# Patient Record
Sex: MALE | Race: WHITE | NOT HISPANIC OR LATINO | Employment: OTHER | URBAN - METROPOLITAN AREA
[De-identification: names, ages, dates, MRNs, and addresses within clinical notes are randomized per-mention and may not be internally consistent; named-entity substitution may affect disease eponyms.]

---

## 2017-02-09 ENCOUNTER — TELEPHONE (OUTPATIENT)
Dept: RHEUMATOLOGY | Facility: CLINIC | Age: 82
End: 2017-02-09

## 2017-02-09 NOTE — TELEPHONE ENCOUNTER
"Unable to discuss specifics with Amarilis as there is not a signed consent in the chart.  We did discuss that an appointment could be made with the provider the pt saw much sooner than May, even tomorrow. She will go to her father's home tomorrow and they will call to discuss the appointment and if he wants to be seen sooner. Also asked Amarilis to have the local MD fax all of the lab results that have been done since pt was last seen here to the present.    Amarilis and other family members are concerned about the patient as he doesn't look right, even though the pt said he is \"fine\". Pt is currently at 10 mg of prednisone daily and it had been suggested by local Rheumatologist to decrease by 1 mg and they are concerned that it should be done.     We will be able to discuss further with pt tomorrow, when they call this clinic. Fax numbers given to Amarilis for the lab results.  "

## 2017-02-09 NOTE — TELEPHONE ENCOUNTER
Amarilis (daughter) called re: care plan. Asking what Tx plan is, local rheumatologist trying to contact Dr. Navarrete several wks. Amarilis concerned about recent out of range labs from local clinic, also next appt changed to 5/2017. Can be reached at [phone]. Detailed VM fine. Message sent to rheum pool.

## 2017-02-13 NOTE — TELEPHONE ENCOUNTER
----- Message from Finn Navarrete MD sent at 2/10/2017  9:36 AM CST -----  Regarding: RE: Follow Up Appointment  ThanksBradly Nettles    ----- Message -----     From: Sunil Lester, RN     Sent: 2/10/2017   8:44 AM       To: Finn Navarrete MD  Subject: RE: Follow Up Appointment                        I spoke with the daughter last evening and had offered an appt with you next week. Actually I had asked if they would be able to come today, but she didn't think that would be possible. They will be calling me today sometime to discuss.    ----- Message -----     From: Finn Navarrete MD     Sent: 2/10/2017   8:05 AM       To: Sunil Lester RN  Subject: Follow Up Appointment                            Dickson Barber,    I spoke with Dr. Martinez (referring rheumatologist from [place]) about this patient yesterday. They would like to be seen sooner than their already scheduled follow up in April.    This is OK with me. Can someone reach out to them to set up an appointment in the next couple weeks? Next week would be good because I have a lot of openings.    Thanks,    Tho

## 2017-02-13 NOTE — TELEPHONE ENCOUNTER
Call placed to pt. Informed him that his provider wanted the pt seen earlier than his April appointment. Pt is agreeable to be seen this Friday at 7:30 am by Dr Navarrete. He verified understanding by repeating date and time. Staff message sent to the pool asking for assistance in scheduling this appointment.

## 2017-02-15 ENCOUNTER — MYC MEDICAL ADVICE (OUTPATIENT)
Dept: RHEUMATOLOGY | Facility: CLINIC | Age: 82
End: 2017-02-15

## 2017-02-15 NOTE — TELEPHONE ENCOUNTER
This message has been sent to the covering nurse for review.   Whitley Torres CMA (AAMA)  P Rheumatology

## 2017-02-17 ENCOUNTER — OFFICE VISIT (OUTPATIENT)
Dept: RHEUMATOLOGY | Facility: CLINIC | Age: 82
End: 2017-02-17
Attending: INTERNAL MEDICINE
Payer: MEDICARE

## 2017-02-17 VITALS
SYSTOLIC BLOOD PRESSURE: 137 MMHG | WEIGHT: 165 LBS | OXYGEN SATURATION: 99 % | HEART RATE: 72 BPM | BODY MASS INDEX: 25.9 KG/M2 | DIASTOLIC BLOOD PRESSURE: 79 MMHG | HEIGHT: 67 IN

## 2017-02-17 DIAGNOSIS — M35.3 PMR (POLYMYALGIA RHEUMATICA) (H): ICD-10-CM

## 2017-02-17 DIAGNOSIS — M35.3 PMR (POLYMYALGIA RHEUMATICA) (H): Primary | ICD-10-CM

## 2017-02-17 DIAGNOSIS — Z87.39 HX OF CALCIUM PYROPHOSPHATE DEPOSITION DISEASE (CPPD): ICD-10-CM

## 2017-02-17 LAB
ALBUMIN SERPL-MCNC: 3.3 G/DL (ref 3.4–5)
ALP SERPL-CCNC: 69 U/L (ref 40–150)
ALT SERPL W P-5'-P-CCNC: 20 U/L (ref 0–70)
ANION GAP SERPL CALCULATED.3IONS-SCNC: 10 MMOL/L (ref 3–14)
AST SERPL W P-5'-P-CCNC: 19 U/L (ref 0–45)
BASOPHILS # BLD AUTO: 0 10E9/L (ref 0–0.2)
BASOPHILS NFR BLD AUTO: 0.1 %
BILIRUB DIRECT SERPL-MCNC: <0.1 MG/DL (ref 0–0.2)
BILIRUB SERPL-MCNC: 0.3 MG/DL (ref 0.2–1.3)
BUN SERPL-MCNC: 19 MG/DL (ref 7–30)
CALCIUM SERPL-MCNC: 9.1 MG/DL (ref 8.5–10.1)
CHLORIDE SERPL-SCNC: 103 MMOL/L (ref 94–109)
CO2 SERPL-SCNC: 29 MMOL/L (ref 20–32)
CREAT SERPL-MCNC: 1.04 MG/DL (ref 0.66–1.25)
CRP SERPL-MCNC: 6.8 MG/L (ref 0–8)
DIFFERENTIAL METHOD BLD: ABNORMAL
EOSINOPHIL # BLD AUTO: 0 10E9/L (ref 0–0.7)
EOSINOPHIL NFR BLD AUTO: 0.1 %
ERYTHROCYTE [DISTWIDTH] IN BLOOD BY AUTOMATED COUNT: 19.6 % (ref 10–15)
ERYTHROCYTE [SEDIMENTATION RATE] IN BLOOD BY WESTERGREN METHOD: 17 MM/H (ref 0–20)
GFR SERPL CREATININE-BSD FRML MDRD: 68 ML/MIN/1.7M2
GLUCOSE SERPL-MCNC: 108 MG/DL (ref 70–99)
HCT VFR BLD AUTO: 39.9 % (ref 40–53)
HGB BLD-MCNC: 13 G/DL (ref 13.3–17.7)
IMM GRANULOCYTES # BLD: 0.1 10E9/L (ref 0–0.4)
IMM GRANULOCYTES NFR BLD: 1.2 %
LYMPHOCYTES # BLD AUTO: 1 10E9/L (ref 0.8–5.3)
LYMPHOCYTES NFR BLD AUTO: 9.6 %
MCH RBC QN AUTO: 30.7 PG (ref 26.5–33)
MCHC RBC AUTO-ENTMCNC: 32.6 G/DL (ref 31.5–36.5)
MCV RBC AUTO: 94 FL (ref 78–100)
MONOCYTES # BLD AUTO: 1.1 10E9/L (ref 0–1.3)
MONOCYTES NFR BLD AUTO: 10.3 %
NEUTROPHILS # BLD AUTO: 8.3 10E9/L (ref 1.6–8.3)
NEUTROPHILS NFR BLD AUTO: 78.7 %
NRBC # BLD AUTO: 0 10*3/UL
NRBC BLD AUTO-RTO: 0 /100
PLATELET # BLD AUTO: 273 10E9/L (ref 150–450)
POTASSIUM SERPL-SCNC: 4.4 MMOL/L (ref 3.4–5.3)
RBC # BLD AUTO: 4.24 10E12/L (ref 4.4–5.9)
SODIUM SERPL-SCNC: 141 MMOL/L (ref 133–144)
WBC # BLD AUTO: 10.6 10E9/L (ref 4–11)

## 2017-02-17 PROCEDURE — 85652 RBC SED RATE AUTOMATED: CPT | Performed by: STUDENT IN AN ORGANIZED HEALTH CARE EDUCATION/TRAINING PROGRAM

## 2017-02-17 PROCEDURE — 85025 COMPLETE CBC W/AUTO DIFF WBC: CPT | Performed by: STUDENT IN AN ORGANIZED HEALTH CARE EDUCATION/TRAINING PROGRAM

## 2017-02-17 PROCEDURE — 82784 ASSAY IGA/IGD/IGG/IGM EACH: CPT | Performed by: STUDENT IN AN ORGANIZED HEALTH CARE EDUCATION/TRAINING PROGRAM

## 2017-02-17 PROCEDURE — 84460 ALANINE AMINO (ALT) (SGPT): CPT | Performed by: STUDENT IN AN ORGANIZED HEALTH CARE EDUCATION/TRAINING PROGRAM

## 2017-02-17 PROCEDURE — 84166 PROTEIN E-PHORESIS/URINE/CSF: CPT | Performed by: STUDENT IN AN ORGANIZED HEALTH CARE EDUCATION/TRAINING PROGRAM

## 2017-02-17 PROCEDURE — 99212 OFFICE O/P EST SF 10 MIN: CPT | Mod: ZF

## 2017-02-17 PROCEDURE — 82247 BILIRUBIN TOTAL: CPT | Performed by: STUDENT IN AN ORGANIZED HEALTH CARE EDUCATION/TRAINING PROGRAM

## 2017-02-17 PROCEDURE — 00000402 ZZHCL STATISTIC TOTAL PROTEIN: Performed by: STUDENT IN AN ORGANIZED HEALTH CARE EDUCATION/TRAINING PROGRAM

## 2017-02-17 PROCEDURE — 80048 BASIC METABOLIC PNL TOTAL CA: CPT | Performed by: STUDENT IN AN ORGANIZED HEALTH CARE EDUCATION/TRAINING PROGRAM

## 2017-02-17 PROCEDURE — 86334 IMMUNOFIX E-PHORESIS SERUM: CPT | Performed by: STUDENT IN AN ORGANIZED HEALTH CARE EDUCATION/TRAINING PROGRAM

## 2017-02-17 PROCEDURE — 84075 ASSAY ALKALINE PHOSPHATASE: CPT | Performed by: STUDENT IN AN ORGANIZED HEALTH CARE EDUCATION/TRAINING PROGRAM

## 2017-02-17 PROCEDURE — 86140 C-REACTIVE PROTEIN: CPT | Performed by: STUDENT IN AN ORGANIZED HEALTH CARE EDUCATION/TRAINING PROGRAM

## 2017-02-17 PROCEDURE — 36415 COLL VENOUS BLD VENIPUNCTURE: CPT | Performed by: STUDENT IN AN ORGANIZED HEALTH CARE EDUCATION/TRAINING PROGRAM

## 2017-02-17 PROCEDURE — 82248 BILIRUBIN DIRECT: CPT | Performed by: STUDENT IN AN ORGANIZED HEALTH CARE EDUCATION/TRAINING PROGRAM

## 2017-02-17 PROCEDURE — 84450 TRANSFERASE (AST) (SGOT): CPT | Performed by: STUDENT IN AN ORGANIZED HEALTH CARE EDUCATION/TRAINING PROGRAM

## 2017-02-17 PROCEDURE — 86335 IMMUNFIX E-PHORSIS/URINE/CSF: CPT | Performed by: STUDENT IN AN ORGANIZED HEALTH CARE EDUCATION/TRAINING PROGRAM

## 2017-02-17 PROCEDURE — 82040 ASSAY OF SERUM ALBUMIN: CPT | Performed by: STUDENT IN AN ORGANIZED HEALTH CARE EDUCATION/TRAINING PROGRAM

## 2017-02-17 PROCEDURE — 84165 PROTEIN E-PHORESIS SERUM: CPT | Performed by: STUDENT IN AN ORGANIZED HEALTH CARE EDUCATION/TRAINING PROGRAM

## 2017-02-17 RX ORDER — PREDNISONE 5 MG/1
5 TABLET ORAL DAILY
Qty: 90 TABLET | Refills: 2 | Status: SHIPPED
Start: 2017-02-17 | End: 2017-05-19

## 2017-02-17 RX ORDER — PREDNISONE 1 MG/1
1 TABLET ORAL DAILY
Qty: 360 TABLET | Refills: 2 | Status: SHIPPED
Start: 2017-02-17 | End: 2017-05-19

## 2017-02-17 ASSESSMENT — PAIN SCALES - GENERAL: PAINLEVEL: NO PAIN (0)

## 2017-02-17 NOTE — LETTER
2/17/2017      RE: Christian Akers  [address]  [facility] [address]       Rheumatology Clinic Visit     Christian Akers MRN# [id number]   YOB: 1930 Age: 87 year old     Date of Visit: 2/17/2017    Primary care provider: Luana Martinez MD, [facility]  Referring provider: Luana Martinez MD, [facility]         Assessment and Plan:   #Polyarticular inflammatory arthritis-right knee aspiration 7/2016 with 13,000 WBC's, no crystals  #Chondrocalcinosis of wrists, history of acute pseudogout left knee in 2015, left knee aspiration with 2100 WBC's, intracellular CPP  #History of neck/shoulder pain, myalgia, weakness, elevated inflammatory markers, bilateral temporal artery biopsy negative November of 2016 but after 4-5 months of high dose prednisone  #Osteopenia  #Long-term use of high doses of corticosteroids  #Monoclonal proteinemia  #History of unprovoked DVT/PE in 2015, treated with 12 months anticoagulation    Mr. Akers is doing well on his current prednisone taper. At his visits at [facility], his CRP seems to be quite discrepant with his symptoms, his ESR less so. Today he feels well and, per our labs, his inflammatory markers are essentially normal for his age. This is encouraging. I still suspect he has both CPPD and PMR and is responding to appropriate therapy. He did just have his left ankle injected by either a podiatrist or an orthopaedist but denied any swelling or warmth; I wonder if that represented a breakthrough CPPD attack while tapering prednisone.    -decrease prednisone by 1 mg/day every month  -continue methotrexate 8 tablets weekly with the indication being steroid-sparing therapy in PMR  -calcium, vitamin D, and a bisphosphonate given his existing osteopenia  -repeat inflammatory markers today are normal  -will repeat SPEP and UPEP with immunofixation for clarification on the paraproteinemia issue  -methotrexate labs  today  -he should stay in contact with Dr. Martinez of [facility] given the proximity in the event something flares up but he and his family would prefer to have their medications managed through the [facility]  -if he is having flares of mono or oligoarticular arthritis I would be very suspicious of CPPD and in the future we could consider addition of Plaquenil or colchicine    Follow up in 3 months.    Seen and discussed with Dr. Julio.    Finn Navarrete MD  Rheumatology Fellow  [phone]    CC: Luana Martinez MD, [facility]          History of Present Illness:   From initial HPI: Christian Akers is a 87 year old male who presented for a second opinion of polymyalgia rheumatica vs giant cell arteritis and polyarticular inflammatory arthritis. Mr. Akers had his left knee replaced 4/25/16 and did well with rehab until Memorial Day when he developed bilateral shoulder and neck pain that got progressively worse. He also had stiffness in his hands, wrists, elbows, feet, ankles, and knees. He also had difficulty getting out of chairs, bed, and moving around. He was seen by several providers and the ED. He had been given baclofen, which did not help, and then NSAID's which also did not help. In June of 2016 he developed weakness and poor appetite and was noted to be febrile and tachycardic. He was evaluated in [place] and found to have a hemoglobin of 9.8 (around 2 grams lower than last prior) and a white count of 14.1. He was hospitalized and had an extensive work up including CT abdomen/pelvis, CTA, CT head, endoscopy, and colonoscopy. His endoscopy showed what was likely reactive gastropathy from the non-steroidals. CTA showed bibasilar infiltrates with small effusions and he was ultimately treated for pneumonia. He was discharged on a PPi and cessation of NSAID's. Because his arthritic symptoms did not saundra he was seen by Dr. Luana Martinez at [facility]  6/21/16 who felt his symptoms were most likely related to crystalline arthritis and he was given prednisone 10 mg per day and his right knee was aspirated. I do not have records of the reasoning behind this decision, but between June and mid-July his prednisone was increased to 60 mg daily and then tapered down to 40 mg daily with complete resolution of his symptoms. It was noted that he had persistently low albumin (low 3'willian) but elevated inflammatory markers despite these high doses. He was placed on methotrexate with a working diagnosis of seronegative rheumatoid arthritis in mid-July and he was tapered down to 10-12.5 mg of prednisone daily which apparently resolved his symptoms. He was appropriately on calcium and vitamin D, Fosamax, and Bactrim prophylaxis during this time when his prednisone dose was high. In October his CRP was 43.7 on 12.5 mg of prednisone daily but he felt well and was tapered down to 10 mg daily. However in mid-November he began to experience fatigue but denied other GCA symptoms. His prednisone was increased back to 50 mg daily and he had a bilateral temporal artery biopsy which was reportedly negative. Another CT chest/abdomen/pelvis was negative for malignancy. He was noted to have a monoclonal proteinemia that was thought insignificant by heme/onc. CRP at his November visit when on 10 mg of prednisone daily was 96.7. His daughter also notes he had a urinary tract infection at some point in this interim, but I note several negative UA's in his paper chart throughout the entire fall. He was then referred to [facility] for a second opinion.    On his initial visit he was taking prednisone 40 mg daily and felt essentially normal. His prednisone was decreased to 20 mg daily and further tapered down to 10 mg in 2.5 mg/day increments every other week. He followed up with Dr. Martinez and Ms. Garcia of [facility] rheumatology who noted that his CRP was persistently elevated but he was feeling  well. Given the discrepancy he was seen an an expedited basis here in the [facility].    Last seen: 16  Interim history:  Mr. Akers notes today that he is doing fairly well. He feels essentially normal but does wish he had slightly more energy. He went bowling the other day and celebrated his birthday with his family yesterday. He also is exercising every day as instructed by his physical therapist after his knee replacement. He is currently on prednisone 10 mg daily. He takes his 8 tablets of methotrexate weekly on  and washes them down with a large glass of water. He also notes he had some pain in his left ankle a couple weeks ago and had it injected with steroids of the medial and lateral aspect. He does not think the joint swelled or was red or warm.          Review of Systems:   Constitutional: negative  Skin: negative  Eyes: negative  Ears/Nose/Throat: negative  Respiratory: negative  Cardiovascular: negative  Gastrointestinal: negative  Genitourinary: negative  Musculoskeletal: negative  Neurologic: negative  Psychiatric: negative  Hematologic/Lymphatic/Immunologic: negative  Endocrine: negative          Past Medical History:   HTN  HLD  BPH with negative prostate biopsies in   RBBB  Unprovoked PE 2015, treated with anticoagulation for 12 months  Seronegative inflammatory arthritis  OA  UTI in  with hemorrhagic cystitis in   Borderline glaucoma  Acute pseudogout of left knee 2015   Osteopenia    Left inguinal hernia repair  Cataract repair  Left TKA 2016  Bilateral temporal artery biopsies 2016-negative         Social History:   Former smoker, quit 40+ years ago  1 drink per week   with 5 children  Retired   Still very active with traveling, regular exercise, golf  Lives in [place] with his long-time female friend         Family History:   Father  of cancer at 83  Mother  at 83 with cancer  Brother  at 76 of prostate cancer  Sister  at  "age 54 of unknown cause, diagnosed with cancer  Daughter alive and well  Son alive and well  5 brothers, 2 sisters alive and healthy         Allergies:   Cialis-stomach upset         Medications:     Current Outpatient Prescriptions   Medication Sig Dispense Refill     predniSONE (DELTASONE) 1 MG tablet Take 1 tablet (1 mg) by mouth daily Take with 5 mg tablets to decrease by 1 mg/day per month. 360 tablet 2     methotrexate 2.5 MG tablet CHEMO Take 8 tablets (20 mg) by mouth once a week Take 8 tablets per week 32 tablet 2     predniSONE (DELTASONE) 5 MG tablet Take 1 tablet (5 mg) by mouth daily Take with 1 mg tablets to decrease by 1 mg/day every month. 90 tablet 2     AMLODIPINE BESYLATE PO Take 5 mg by mouth daily       calcium-vitamin D (CALTRATE) 600-400 MG-UNIT per tablet Take 1 tablet by mouth 2 times daily       Cholecalciferol (VITAMIN D3 PO) Take 1,000 Units by mouth daily       tamsulosin (FLOMAX) 0.4 MG capsule Take 0.8 mg by mouth daily       OMEPRAZOLE PO Take 20 mg by mouth daily       FOLIC ACID PO Take 800 mcg by mouth daily       PREDNISONE PO Take 40 mg by mouth daily Currently taking 10 mg daily       Methotrexate Sodium (METHOTREXATE PO) Take 2.5 mg by mouth once a week Patient takes 8 capsules every Friday       Alendronate Sodium (FOSAMAX PO) Take 70 mg by mouth once a week Takes every Sunday       brimonidine (ALPHAGAN-P) 0.15 % ophthalmic solution Place 1 drop into both eyes daily       timolol (TIMOPTIC) 0.5 % ophthalmic solution Place 1 drop into both eyes daily       ASPIRIN PO Take 81 mg by mouth daily       Acetaminophen (TYLENOL PO) Take 500 mg by mouth every 6 hours as needed for mild pain or fever       sulfamethoxazole-trimethoprim (BACTRIM/SEPTRA) 400-80 MG per tablet Take 1 tablet by mouth Reported on 2/17/2017            Physical Exam:   Blood pressure 137/79, pulse 72, height 1.689 m (5' 6.5\"), weight 74.8 kg (165 lb), SpO2 99 %.  Wt Readings from Last 4 Encounters:   02/17/17 " "74.8 kg (165 lb)   12/30/16 72.5 kg (159 lb 14.4 oz)     Constitutional: well-developed, appearing stated age; cooperative  Eyes: normal EOM, PERRLA, vision, conjunctiva, sclera  ENT: normal external ears, nose, hearing, lips, teeth, gums, throat, no mucous membrane lesions, normal saliva pool  Neck: no mass or thyroid enlargement  Resp: lungs clear to auscultation  CV: RRR, no murmurs, rubs or gallops, no edema  GI: no abdominal mass or tenderness, no organomegaly   : not tested  Lymph: no cervical, supraclavicular, or epitrochlear nodes  MS: The TMJ, neck, shoulder, elbow, wrist, MCP/PIP/DIP, spine, hip, knee, ankle, and foot MTP/IP joints were examined and found normal. No active synovitis or altered joint anatomy. Full joint ROM. Normal  strength. No dactylitis,  tenosynovitis, enthesopathy. Left knee arthrotomy scar.   Skin: no nail pitting, alopecia, rash, nodules or lesions  Neuro: normal cranial nerves, strength, sensation, DTR's  Psych: normal judgement, orientation, memory, affect         Data:   Outside Data:  Hep B/C serology negative    Lyme, Anaplasma negative  RF negative  CAMPBELL negative  Babesia negative  Ehrlichia negative  Parvovirus PCR negative  EBV IgG positive, IgM negative  Monoscreen negative  Immunoglobulins with normal IgA/G, low IgM (22, 50 lower limit of normal)  PTH 14.4 (normal)  Ferritin 1137.3 6/19/16    Left knee aspiration 11/29/15:  2106 cells, 95% PMN's, intracellular CPPD    Right knee aspiration June 2016: 13,830 cells, 92% PMN's, no crystals seen   Cr 1.1-1.2  HGB 10-11  WBC's 14-15 with PMN predominance, lymphopenia    Outside Radiology:  Echocardiogram 6/16/16 for \"fever unexplained\"  The left ventricle is normal size.  The left ventricular systolic function is hyperdynamic.  There is mild concentric hypertrophy.  Left atrium is mildly enlarged by volume.  The aortic valve is mildly sclerotic.  Moderate mitral valve annular calcification.  Borderline mitral valve " stenosis.  Estimated RV systolic pressure is 43 mm Hg above right atrial pressure.  Small pericardial effusion.  There is no echocardiographic evidence of endocarditis.    CT abdomen/pelvis   1. No acute obstructive or inflammatory abdominopelvic process.  2. Moderate to severe colonic diverticulosis without diverticulitis. Mild constipation.  3. Mild cardiomegaly, aortic valve and mitral annular calcifications, better assessed by nonemergent cardiac echo. Also coronary arterial calcifications.  4. Moderate generalized osteopenia. Outpatient DEXA scan recommended.  5. Mild diffuse bladder mural thickening versus underdistention could be due to chronic bladder outlet obstruction due to prostatomegaly. Infectious cystitis felt to be less likely.     CTA:  1. Mild-to-moderate bilateral basilar infiltrates with small effusions.  2. In the major pulmonary arteries and first divisions there is no evidence for pulmonary emboli. The distal branches are difficult to see.    Colonoscopy:  Rectal exam was preformed and was Normal. The olympus videoendoscope was inserted into the anus and passed without difficulty to the cecum. TI normal. Cecum was identified by coelesence of the tinea, visualization of the appendiceal orifice and visualization of the ileocecal valve. The scope was slowly withdrawn and all walls of the colon were visualized. No polyps or masses or inflammation was seen. Sigmoid Diverticulosis was identified. Upon reaching the rectum the scope was retroverted and minimal internal hemorrhoids were identified. No abnormalities were noted.    Endoscopy:  The olympus scope was inserted via the oropharyx passed without difficulty via the esophagus and into the stomach. The scope was then advanced into the duodenum. Duodenum was normal. Duodenum biopsies were not obtained. The scope was slowly withdrawn the duodenum and stomach was examined. Forward and retroflexion views were performed. Stomach was had preplyoic  "gastritis. Gastric biopsies were obtained. A sliding hiatal hernia was identified. The GE junction was at 36 cm. GE junction had bowen's like changes. Gastro-esophageal biopsies were obtained. The scope was removed from the patient and esophagus examined. The esophagus was normal.    Endoscopic biopsies:  A) Specimen designated \"prepyloric\", biopsy:  Reactive gastropathy with focal erosion (see comment)  No Helicobacter pylori organisms identified on immunohistochemical  stain  B) GE junction, biopsy:  Mildly inflamed gastric cardiac type mucosa with scant squamous  epithelium  No goblet cell metaplasia identified      Shoulder x-ray: moderate severe left GH arthritis, moderate right AC OA with spurring    Bilateral hand x-ray: bilateral OA of DIP's and MCP's    Bilateral wrist x-rays in PACS: chondrocalcinosis of bilateral triangular cartilages    Results for orders placed or performed in visit on 07/16/16   DX Hip/Pelvis/Spine    Narrative    BONE DENSITOMETRY SCAN    FINDINGS: Bone mineral density in the left hip measured 0.827 G/cm2  with a T-score of -1.4.  Bone mineral density in the femoral neck  measured 0.657 G/cm2 with a T-score of -2.0.    Bone mineral density in the lumbar spine, L1 to L4, measured 1.121  G/cm2 with a T-score of 0.3.    IMPRESSION:  THE PATIENT IS OSTEOPENIC AND FRACTURE RISK IS MODERATE.  Exam Date: Jul 20, 2016 02:21:15 PM  Author: SHARDA QUICK  This report is final and signed       Reviewed Rheumatology lab flowsheet    Attending tie-in note:  I have staffed this patient with the fellow. I have personally reviewed the relevant tests, reports, assessment and plan as documented above.   Carter Julio MD  Rheumatic and Autoimmune Diseases        "

## 2017-02-17 NOTE — PROGRESS NOTES
Rheumatology Clinic Visit     Christian Akers MRN# [id number]   YOB: 1930 Age: 87 year old     Date of Visit: 2/17/2017    Primary care provider: Luana Martinez MD, [facility]  Referring provider: Luana Martinez MD, [facility]         Assessment and Plan:   #Polyarticular inflammatory arthritis-right knee aspiration 7/2016 with 13,000 WBC's, no crystals  #Chondrocalcinosis of wrists, history of acute pseudogout left knee in 2015, left knee aspiration with 2100 WBC's, intracellular CPP  #History of neck/shoulder pain, myalgia, weakness, elevated inflammatory markers, bilateral temporal artery biopsy negative November of 2016 but after 4-5 months of high dose prednisone  #Osteopenia  #Long-term use of high doses of corticosteroids  #Monoclonal proteinemia  #History of unprovoked DVT/PE in 2015, treated with 12 months anticoagulation    Mr. Akers is doing well on his current prednisone taper. At his visits at [facility], his CRP seems to be quite discrepant with his symptoms, his ESR less so. Today he feels well and, per our labs, his inflammatory markers are essentially normal for his age. This is encouraging. I still suspect he has both CPPD and PMR and is responding to appropriate therapy. He did just have his left ankle injected by either a podiatrist or an orthopaedist but denied any swelling or warmth; I wonder if that represented a breakthrough CPPD attack while tapering prednisone.    -decrease prednisone by 1 mg/day every month  -continue methotrexate 8 tablets weekly with the indication being steroid-sparing therapy in PMR  -calcium, vitamin D, and a bisphosphonate given his existing osteopenia  -repeat inflammatory markers today are normal  -will repeat SPEP and UPEP with immunofixation for clarification on the paraproteinemia issue  -methotrexate labs today  -he should stay in contact with Dr. Martinez of [facility] given the proximity  in the event something flares up but he and his family would prefer to have their medications managed through the [facility]  -if he is having flares of mono or oligoarticular arthritis I would be very suspicious of CPPD and in the future we could consider addition of Plaquenil or colchicine    Follow up in 3 months.    Seen and discussed with Dr. Julio.    Finn Navarrete MD  Rheumatology Fellow  [phone]    CC: Luana Martinez MD, [facility]          History of Present Illness:   From initial HPI: Christian Akers is a 87 year old male who presented for a second opinion of polymyalgia rheumatica vs giant cell arteritis and polyarticular inflammatory arthritis. Mr. Akers had his left knee replaced 4/25/16 and did well with rehab until Memorial Day when he developed bilateral shoulder and neck pain that got progressively worse. He also had stiffness in his hands, wrists, elbows, feet, ankles, and knees. He also had difficulty getting out of chairs, bed, and moving around. He was seen by several providers and the ED. He had been given baclofen, which did not help, and then NSAID's which also did not help. In June of 2016 he developed weakness and poor appetite and was noted to be febrile and tachycardic. He was evaluated in [place] and found to have a hemoglobin of 9.8 (around 2 grams lower than last prior) and a white count of 14.1. He was hospitalized and had an extensive work up including CT abdomen/pelvis, CTA, CT head, endoscopy, and colonoscopy. His endoscopy showed what was likely reactive gastropathy from the non-steroidals. CTA showed bibasilar infiltrates with small effusions and he was ultimately treated for pneumonia. He was discharged on a PPi and cessation of NSAID's. Because his arthritic symptoms did not saundra he was seen by Dr. Luana Martinez at [facility] 6/21/16 who felt his symptoms were most likely related to crystalline arthritis and he  was given prednisone 10 mg per day and his right knee was aspirated. I do not have records of the reasoning behind this decision, but between June and mid-July his prednisone was increased to 60 mg daily and then tapered down to 40 mg daily with complete resolution of his symptoms. It was noted that he had persistently low albumin (low 3'willian) but elevated inflammatory markers despite these high doses. He was placed on methotrexate with a working diagnosis of seronegative rheumatoid arthritis in mid-July and he was tapered down to 10-12.5 mg of prednisone daily which apparently resolved his symptoms. He was appropriately on calcium and vitamin D, Fosamax, and Bactrim prophylaxis during this time when his prednisone dose was high. In October his CRP was 43.7 on 12.5 mg of prednisone daily but he felt well and was tapered down to 10 mg daily. However in mid-November he began to experience fatigue but denied other GCA symptoms. His prednisone was increased back to 50 mg daily and he had a bilateral temporal artery biopsy which was reportedly negative. Another CT chest/abdomen/pelvis was negative for malignancy. He was noted to have a monoclonal proteinemia that was thought insignificant by heme/onc. CRP at his November visit when on 10 mg of prednisone daily was 96.7. His daughter also notes he had a urinary tract infection at some point in this interim, but I note several negative UA's in his paper chart throughout the entire fall. He was then referred to [facility] [place] for a second opinion.    On his initial visit he was taking prednisone 40 mg daily and felt essentially normal. His prednisone was decreased to 20 mg daily and further tapered down to 10 mg in 2.5 mg/day increments every other week. He followed up with Dr. Martinez and Ms. Garcia of [facility] rheumatology who noted that his CRP was persistently elevated but he was feeling well. Given the discrepancy he was seen an an expedited basis here in the [facility].    Last  seen: 16  Interim history:  Mr. Akers notes today that he is doing fairly well. He feels essentially normal but does wish he had slightly more energy. He went bowling the other day and celebrated his birthday with his family yesterday. He also is exercising every day as instructed by his physical therapist after his knee replacement. He is currently on prednisone 10 mg daily. He takes his 8 tablets of methotrexate weekly on  and washes them down with a large glass of water. He also notes he had some pain in his left ankle a couple weeks ago and had it injected with steroids of the medial and lateral aspect. He does not think the joint swelled or was red or warm.          Review of Systems:   Constitutional: negative  Skin: negative  Eyes: negative  Ears/Nose/Throat: negative  Respiratory: negative  Cardiovascular: negative  Gastrointestinal: negative  Genitourinary: negative  Musculoskeletal: negative  Neurologic: negative  Psychiatric: negative  Hematologic/Lymphatic/Immunologic: negative  Endocrine: negative          Past Medical History:   HTN  HLD  BPH with negative prostate biopsies in   RBBB  Unprovoked PE 2015, treated with anticoagulation for 12 months  Seronegative inflammatory arthritis  OA  UTI in  with hemorrhagic cystitis in   Borderline glaucoma  Acute pseudogout of left knee 2015   Osteopenia    Left inguinal hernia repair  Cataract repair  Left TKA 2016  Bilateral temporal artery biopsies 2016-negative         Social History:   Former smoker, quit 40+ years ago  1 drink per week   with 5 children  Retired   Still very active with traveling, regular exercise, golf  Lives in [place] with his long-time female friend         Family History:   Father  of cancer at 83  Mother  at 83 with cancer  Brother  at 76 of prostate cancer  Sister  at age 54 of unknown cause, diagnosed with cancer  Daughter alive and well  Son alive and  "well  5 brothers, 2 sisters alive and healthy         Allergies:   Cialis-stomach upset         Medications:     Current Outpatient Prescriptions   Medication Sig Dispense Refill     predniSONE (DELTASONE) 1 MG tablet Take 1 tablet (1 mg) by mouth daily Take with 5 mg tablets to decrease by 1 mg/day per month. 360 tablet 2     methotrexate 2.5 MG tablet CHEMO Take 8 tablets (20 mg) by mouth once a week Take 8 tablets per week 32 tablet 2     predniSONE (DELTASONE) 5 MG tablet Take 1 tablet (5 mg) by mouth daily Take with 1 mg tablets to decrease by 1 mg/day every month. 90 tablet 2     AMLODIPINE BESYLATE PO Take 5 mg by mouth daily       calcium-vitamin D (CALTRATE) 600-400 MG-UNIT per tablet Take 1 tablet by mouth 2 times daily       Cholecalciferol (VITAMIN D3 PO) Take 1,000 Units by mouth daily       tamsulosin (FLOMAX) 0.4 MG capsule Take 0.8 mg by mouth daily       OMEPRAZOLE PO Take 20 mg by mouth daily       FOLIC ACID PO Take 800 mcg by mouth daily       PREDNISONE PO Take 40 mg by mouth daily Currently taking 10 mg daily       Methotrexate Sodium (METHOTREXATE PO) Take 2.5 mg by mouth once a week Patient takes 8 capsules every Friday       Alendronate Sodium (FOSAMAX PO) Take 70 mg by mouth once a week Takes every Sunday       brimonidine (ALPHAGAN-P) 0.15 % ophthalmic solution Place 1 drop into both eyes daily       timolol (TIMOPTIC) 0.5 % ophthalmic solution Place 1 drop into both eyes daily       ASPIRIN PO Take 81 mg by mouth daily       Acetaminophen (TYLENOL PO) Take 500 mg by mouth every 6 hours as needed for mild pain or fever       sulfamethoxazole-trimethoprim (BACTRIM/SEPTRA) 400-80 MG per tablet Take 1 tablet by mouth Reported on 2/17/2017            Physical Exam:   Blood pressure 137/79, pulse 72, height 1.689 m (5' 6.5\"), weight 74.8 kg (165 lb), SpO2 99 %.  Wt Readings from Last 4 Encounters:   02/17/17 74.8 kg (165 lb)   12/30/16 72.5 kg (159 lb 14.4 oz)     Constitutional: " "well-developed, appearing stated age; cooperative  Eyes: normal EOM, PERRLA, vision, conjunctiva, sclera  ENT: normal external ears, nose, hearing, lips, teeth, gums, throat, no mucous membrane lesions, normal saliva pool  Neck: no mass or thyroid enlargement  Resp: lungs clear to auscultation  CV: RRR, no murmurs, rubs or gallops, no edema  GI: no abdominal mass or tenderness, no organomegaly   : not tested  Lymph: no cervical, supraclavicular, or epitrochlear nodes  MS: The TMJ, neck, shoulder, elbow, wrist, MCP/PIP/DIP, spine, hip, knee, ankle, and foot MTP/IP joints were examined and found normal. No active synovitis or altered joint anatomy. Full joint ROM. Normal  strength. No dactylitis,  tenosynovitis, enthesopathy. Left knee arthrotomy scar.   Skin: no nail pitting, alopecia, rash, nodules or lesions  Neuro: normal cranial nerves, strength, sensation, DTR's  Psych: normal judgement, orientation, memory, affect         Data:   Outside Data:  Hep B/C serology negative    Lyme, Anaplasma negative  RF negative  CAMPBELL negative  Babesia negative  Ehrlichia negative  Parvovirus PCR negative  EBV IgG positive, IgM negative  Monoscreen negative  Immunoglobulins with normal IgA/G, low IgM (22, 50 lower limit of normal)  PTH 14.4 (normal)  Ferritin 1137.3 6/19/16    Left knee aspiration 11/29/15:  2106 cells, 95% PMN's, intracellular CPPD    Right knee aspiration June 2016: 13,830 cells, 92% PMN's, no crystals seen   Cr 1.1-1.2  HGB 10-11  WBC's 14-15 with PMN predominance, lymphopenia    Outside Radiology:  Echocardiogram 6/16/16 for \"fever unexplained\"  The left ventricle is normal size.  The left ventricular systolic function is hyperdynamic.  There is mild concentric hypertrophy.  Left atrium is mildly enlarged by volume.  The aortic valve is mildly sclerotic.  Moderate mitral valve annular calcification.  Borderline mitral valve stenosis.  Estimated RV systolic pressure is 43 mm Hg above right atrial " pressure.  Small pericardial effusion.  There is no echocardiographic evidence of endocarditis.    CT abdomen/pelvis   1. No acute obstructive or inflammatory abdominopelvic process.  2. Moderate to severe colonic diverticulosis without diverticulitis. Mild constipation.  3. Mild cardiomegaly, aortic valve and mitral annular calcifications, better assessed by nonemergent cardiac echo. Also coronary arterial calcifications.  4. Moderate generalized osteopenia. Outpatient DEXA scan recommended.  5. Mild diffuse bladder mural thickening versus underdistention could be due to chronic bladder outlet obstruction due to prostatomegaly. Infectious cystitis felt to be less likely.     CTA:  1. Mild-to-moderate bilateral basilar infiltrates with small effusions.  2. In the major pulmonary arteries and first divisions there is no evidence for pulmonary emboli. The distal branches are difficult to see.    Colonoscopy:  Rectal exam was preformed and was Normal. The olympus videoendoscope was inserted into the anus and passed without difficulty to the cecum. TI normal. Cecum was identified by coelesence of the tinea, visualization of the appendiceal orifice and visualization of the ileocecal valve. The scope was slowly withdrawn and all walls of the colon were visualized. No polyps or masses or inflammation was seen. Sigmoid Diverticulosis was identified. Upon reaching the rectum the scope was retroverted and minimal internal hemorrhoids were identified. No abnormalities were noted.    Endoscopy:  The olympus scope was inserted via the oropharyx passed without difficulty via the esophagus and into the stomach. The scope was then advanced into the duodenum. Duodenum was normal. Duodenum biopsies were not obtained. The scope was slowly withdrawn the duodenum and stomach was examined. Forward and retroflexion views were performed. Stomach was had preplyoic gastritis. Gastric biopsies were obtained. A sliding hiatal hernia was  "identified. The GE junction was at 36 cm. GE junction had bowen's like changes. Gastro-esophageal biopsies were obtained. The scope was removed from the patient and esophagus examined. The esophagus was normal.    Endoscopic biopsies:  A) Specimen designated \"prepyloric\", biopsy:  Reactive gastropathy with focal erosion (see comment)  No Helicobacter pylori organisms identified on immunohistochemical  stain  B) GE junction, biopsy:  Mildly inflamed gastric cardiac type mucosa with scant squamous  epithelium  No goblet cell metaplasia identified      Shoulder x-ray: moderate severe left GH arthritis, moderate right AC OA with spurring    Bilateral hand x-ray: bilateral OA of DIP's and MCP's    Bilateral wrist x-rays in PACS: chondrocalcinosis of bilateral triangular cartilages    Results for orders placed or performed in visit on 07/16/16   DX Hip/Pelvis/Spine    Narrative    BONE DENSITOMETRY SCAN    FINDINGS: Bone mineral density in the left hip measured 0.827 G/cm2  with a T-score of -1.4.  Bone mineral density in the femoral neck  measured 0.657 G/cm2 with a T-score of -2.0.    Bone mineral density in the lumbar spine, L1 to L4, measured 1.121  G/cm2 with a T-score of 0.3.    IMPRESSION:  THE PATIENT IS OSTEOPENIC AND FRACTURE RISK IS MODERATE.  Exam Date: Jul 20, 2016 02:21:15 PM  Author: SHARDA QUICK  This report is final and signed       Reviewed Rheumatology lab flowsheet    Attending tie-in note:  I have staffed this patient with the fellow. I have personally reviewed the relevant tests, reports, assessment and plan as documented above.   Carter Julio MD  Rheumatic and Autoimmune Diseases    "

## 2017-02-17 NOTE — PATIENT INSTRUCTIONS
Decrease prednisone by 1 mg/day every month. You will need to use 5 mg tablets and 1 mg tablets in combination for this. If your symptoms return, please go back to the last dose of prednisone daily that was effective for you.    Check labs today on your way out.    Follow up with me in 3 months.    Call if you need anything.

## 2017-02-17 NOTE — NURSING NOTE
"Chief Complaint   Patient presents with     RECHECK     Follow up for Arthritis        Initial /79  Pulse 72  Ht 1.727 m (5' 8\")  Wt 74.8 kg (165 lb)  SpO2 99%  BMI 25.09 kg/m2 Estimated body mass index is 25.09 kg/(m^2) as calculated from the following:    Height as of this encounter: 1.727 m (5' 8\").    Weight as of this encounter: 74.8 kg (165 lb).  Medication Reconciliation: complete   Leah Newton CMA    "

## 2017-02-17 NOTE — LETTER
Patient:  Christian Akers  :   1930  MRN:     [id number]        Mr.Anthony Akers  [address] [facility] [address]        2017    Dear ,    We are writing to inform you of your test results.      Resulted Orders   ALT   Result Value Ref Range    ALT 20 0 - 70 U/L   AST   Result Value Ref Range    AST 19 0 - 45 U/L   Bilirubin  total   Result Value Ref Range    Bilirubin Total 0.3 0.2 - 1.3 mg/dL   Bilirubin direct   Result Value Ref Range    Bilirubin Direct <0.1 0.0 - 0.2 mg/dL   Alkaline phosphatase   Result Value Ref Range    Alkaline Phosphatase 69 40 - 150 U/L   Albumin level   Result Value Ref Range    Albumin 3.3 (L) 3.4 - 5.0 g/dL   CBC with platelets differential   Result Value Ref Range    WBC 10.6 4.0 - 11.0 10e9/L    RBC Count 4.24 (L) 4.4 - 5.9 10e12/L    Hemoglobin 13.0 (L) 13.3 - 17.7 g/dL    Hematocrit 39.9 (L) 40.0 - 53.0 %    MCV 94 78 - 100 fl    MCH 30.7 26.5 - 33.0 pg    MCHC 32.6 31.5 - 36.5 g/dL    RDW 19.6 (H) 10.0 - 15.0 %    Platelet Count 273 150 - 450 10e9/L    Diff Method Automated Method     % Neutrophils 78.7 %    % Lymphocytes 9.6 %    % Monocytes 10.3 %    % Eosinophils 0.1 %    % Basophils 0.1 %    % Immature Granulocytes 1.2 %    Nucleated RBCs 0 0 /100    Absolute Neutrophil 8.3 1.6 - 8.3 10e9/L    Absolute Lymphocytes 1.0 0.8 - 5.3 10e9/L    Absolute Monocytes 1.1 0.0 - 1.3 10e9/L    Absolute Eosinophils 0.0 0.0 - 0.7 10e9/L    Absolute Basophils 0.0 0.0 - 0.2 10e9/L    Abs Immature Granulocytes 0.1 0 - 0.4 10e9/L    Absolute Nucleated RBC 0.0    Basic metabolic panel   Result Value Ref Range    Sodium 141 133 - 144 mmol/L    Potassium 4.4 3.4 - 5.3 mmol/L    Chloride 103 94 - 109 mmol/L    Carbon Dioxide 29 20 - 32 mmol/L    Anion Gap 10 3 - 14 mmol/L    Glucose 108 (H) 70 - 99 mg/dL    Urea Nitrogen 19 7 - 30 mg/dL    Creatinine 1.04 0.66 - 1.25 mg/dL    GFR Estimate 68 >60 mL/min/1.7m2      Comment:      Non  GFR Calc    GFR  Estimate If Black 82 >60 mL/min/1.7m2      Comment:       GFR Calc    Calcium 9.1 8.5 - 10.1 mg/dL   CRP inflammation   Result Value Ref Range    CRP Inflammation 6.8 0.0 - 8.0 mg/L   ESR   Result Value Ref Range    Sed Rate 17 0 - 20 mm/h   Protein electrophoresis   Result Value Ref Range    Albumin Fraction 3.8 3.7 - 5.1 g/dL    Alpha 1 Fraction 0.3 0.2 - 0.4 g/dL    Alpha 2 Fraction 0.9 0.5 - 0.9 g/dL    Beta Fraction 0.6 0.6 - 1.0 g/dL    Gamma Fraction 0.6 (L) 0.7 - 1.6 g/dL    Monoclonal Peak 0.1 (H) 0.0 g/dL    ELP Interpretation:       Two very small monoclonal proteins (each about 0.1 g/dL or less) seen in the   gamma fraction.  See immunofixation report on same specimen. Pathologic   significance requires clinical correlation.  JOYCE Monroy M.D., Ph.D.,   Pathologist ().     Protein Immunofixation Serum   Result Value Ref Range    Immunofixation ELP       (Note)  Monoclonal IgG immunoglobulin of kappa light chain type. Very  small monoclonal IgG immunoglobulin of lambda light chain type.  Monoclonal antibody therapeutics (e.g. Daratumumab) may appear as  monoclonal proteins on serum electrophoresis and immunofixation.  Results should be interpreted with caution if the patient is  receiving monoclonal antibody therapy. Pathological significance  requires clinical correlation.  JOYCE Monroy M.D., Ph.D.,  Pathologist ([phone])         695 - 1620 mg/dL     70 - 380 mg/dL    IGM 14 (L) 60 - 265 mg/dL   Protein immunofixation urine   Result Value Ref Range    Immunofix ELP Urine       (Note)  Very small monoclonal free immunoglobulin light chain of kappa  chain type. Pathological significance requires clinical correlation.  JOYCE Monroy M.D., Ph.D., Pathologist ([phone])       Protein electrophoresis random urine   Result Value Ref Range    Albumin Fraction Urine 30.6 (H) 0 %    Alpha 1 Fraction Urine 16.1 (H) 0 %    Alpha 2 Fraction Urine 9.9 (H) 0 %     Beta Fraction Urine 24.6 (H) 0 %    Gamma Fraction Urine 18.8 (H) 0 %    Monoclonal Peak Urine 0.0 0% %    ELP Interpretation Urine       Albumin and globulins present. No obvious monoclonal seen by urine   electrophoresis, however a monoclonal protein was seen in this sample by   immunofixation which is a more sensitive method for monoclonal immunoglobulin   detection.Pathological significance requires clinical correlation.  JOYCE Monroy M.D., Ph.D., Pathologist ()           [phone]  2/16/1930

## 2017-02-17 NOTE — MR AVS SNAPSHOT
After Visit Summary   2/17/2017    Christian Akers    MRN: [id number]           Patient Information     Date Of Birth          2/16/1930        Visit Information        Provider Department      2/17/2017 7:30 AM Finn Navarrete MD [facility] Rheumatology        Today's Diagnoses     PMR (polymyalgia rheumatica) (H)    -  1    Hx of calcium pyrophosphate deposition disease (CPPD)          Care Instructions    Decrease prednisone by 1 mg/day every month. You will need to use 5 mg tablets and 1 mg tablets in combination for this. If your symptoms return, please go back to the last dose of prednisone daily that was effective for you.    Check labs today on your way out.    Follow up with me in 3 months.    Call if you need anything.        Follow-ups after your visit        Follow-up notes from your care team     Return in about 3 months (around 5/17/2017).      Your next 10 appointments already scheduled     May 17, 2017  9:00 AM CDT   Lab with  LAB   [facility] Lab ([facility])    [address] [place] [address]  1st Floor  [facility] [id number] [phone]            May 17, 2017 10:00 AM CDT   (Arrive by 9:45 AM)   Return Visit with Tee Goyal MD   [facility] Rheumatology ([facility])    [address] [place] [address]  3rd Floor  [facility] [id number] [phone]              Future tests that were ordered for you today     Open Future Orders        Priority Expected Expires Ordered    Bilirubin direct Routine  2/17/2018 2/17/2017    Alkaline phosphatase Routine  2/17/2018 2/17/2017    Albumin level Routine  2/17/2018 2/17/2017    CBC with platelets differential Routine  2/17/2018 2/17/2017    Basic metabolic panel Routine  2/17/2018 2/17/2017    CRP inflammation Routine  2/17/2018 2/17/2017    ESR Routine  2/17/2018 2/17/2017    Protein electrophoresis Routine  2/17/2018 2/17/2017    Protein Immunofixation Serum Routine  2/17/2018 2/17/2017    Protein  "immunofixation urine Routine  2/17/2018 2/17/2017    Protein electrophoresis random urine Routine  2/17/2018 2/17/2017    ALT Routine  2/17/2018 2/17/2017    AST Routine  2/17/2018 2/17/2017    Bilirubin  total Routine  2/17/2018 2/17/2017            Who to contact     If you have questions or need follow up information about today's clinic visit or your schedule please contact [facility] RHEUMATOLOGY directly at [phone].  Normal or non-critical lab and imaging results will be communicated to you by [employer], letter or phone within 4 business days after the clinic has received the results. If you do not hear from us within 7 days, please contact the clinic through [email or web address] or phone. If you have a critical or abnormal lab result, we will notify you by phone as soon as possible.  Submit refill requests through [email or web address] or call your pharmacy and they will forward the refill request to us. Please allow 3 business days for your refill to be completed.          Additional Information About Your Visit        [email or web address] Information     [email or web address] gives you secure access to your electronic health record. If you see a primary care provider, you can also send messages to your care team and make appointments. If you have questions, please call your primary care clinic.  If you do not have a primary care provider, please call [phone] and they will assist you.        Care EveryWhere ID     This is your Care EveryWhere ID. This could be used by other organizations to access your [facility] medical records  [id number]        Your Vitals Were     Pulse Height Pulse Oximetry BMI (Body Mass Index)          72 1.689 m (5' 6.5\") 99% 26.23 kg/m2         Blood Pressure from Last 3 Encounters:   02/17/17 137/79   12/30/16 (!) 168/97    Weight from Last 3 Encounters:   02/17/17 74.8 kg (165 lb)   12/30/16 72.5 kg (159 lb 14.4 oz)                 Today's Medication Changes          These changes are accurate as of: 2/17/17  8:19 AM.  If you " have any questions, ask your nurse or doctor.               These medicines have changed or have updated prescriptions.        Dose/Directions    * METHOTREXATE PO   This may have changed:  Another medication with the same name was added. Make sure you understand how and when to take each.   Changed by:  Finn Navarrete MD        Dose:  2.5 mg   Take 2.5 mg by mouth once a week Patient takes 8 capsules every Friday   Refills:  0       * methotrexate 2.5 MG tablet CHEMO   This may have changed:  You were already taking a medication with the same name, and this prescription was added. Make sure you understand how and when to take each.   Used for:  PMR (polymyalgia rheumatica) (H), Hx of calcium pyrophosphate deposition disease (CPPD)   Changed by:  Finn Navarrete MD        Dose:  20 mg   Take 8 tablets (20 mg) by mouth once a week Take 8 tablets per week   Quantity:  32 tablet   Refills:  2       * PREDNISONE PO   This may have changed:  Another medication with the same name was added. Make sure you understand how and when to take each.   Changed by:  Finn Navarrete MD        Dose:  40 mg   Take 40 mg by mouth daily Currently taking 10 mg daily   Refills:  0       * predniSONE 1 MG tablet   Commonly known as:  DELTASONE   This may have changed:  You were already taking a medication with the same name, and this prescription was added. Make sure you understand how and when to take each.   Used for:  PMR (polymyalgia rheumatica) (H), Hx of calcium pyrophosphate deposition disease (CPPD)   Changed by:  Finn Navarrete MD        Dose:  1 mg   Take 1 tablet (1 mg) by mouth daily Take with 5 mg tablets to decrease by 1 mg/day per month.   Quantity:  360 tablet   Refills:  2       * predniSONE 5 MG tablet   Commonly known as:  DELTASONE   This may have changed:  You were already taking a medication with the same name, and this prescription was added. Make sure you understand how and when to take each.   Used for:  PMR  (polymyalgia rheumatica) (H), Hx of calcium pyrophosphate deposition disease (CPPD)   Changed by:  Finn Navarrete MD        Dose:  5 mg   Take 1 tablet (5 mg) by mouth daily Take with 1 mg tablets to decrease by 1 mg/day every month.   Quantity:  90 tablet   Refills:  2       * Notice:  This list has 5 medication(s) that are the same as other medications prescribed for you. Read the directions carefully, and ask your doctor or other care provider to review them with you.         Where to get your medicines      These medications were sent to [facility] - [place], [place] - [address]  [address] [place] [address]     Phone:  [phone]     methotrexate 2.5 MG tablet CHEMO    predniSONE 1 MG tablet    predniSONE 5 MG tablet                Primary Care Provider Office Phone # Fax #    Luana Martinez [phone]       [facility] [address] [facility] [address]   Counts include 234 beds at the [facility] [address]        Thank you!     Thank you for choosing [facility] RHEUMATOLOGY  for your care. Our goal is always to provide you with excellent care. Hearing back from our patients is one way we can continue to improve our services. Please take a few minutes to complete the written survey that you may receive in the mail after your visit with us. Thank you!             Your Updated Medication List - Protect others around you: Learn how to safely use, store and throw away your medicines at www.disposemymeds.org.          This list is accurate as of: 2/17/17  8:19 AM.  Always use your most recent med list.                   Brand Name Dispense Instructions for use    AMLODIPINE BESYLATE PO      Take 5 mg by mouth daily       ASPIRIN PO      Take 81 mg by mouth daily       brimonidine 0.15 % ophthalmic solution    ALPHAGAN-P     Place 1 drop into both eyes daily       calcium-vitamin D 600-400 MG-UNIT per tablet    CALTRATE     Take 1 tablet by mouth 2 times daily       FLOMAX 0.4 MG capsule   Generic drug:  tamsulosin       Take 0.8 mg by mouth daily       FOLIC ACID PO      Take 800 mcg by mouth daily       FOSAMAX PO      Take 70 mg by mouth once a week Takes every Sunday       * METHOTREXATE PO      Take 2.5 mg by mouth once a week Patient takes 8 capsules every Friday       * methotrexate 2.5 MG tablet CHEMO     32 tablet    Take 8 tablets (20 mg) by mouth once a week Take 8 tablets per week       OMEPRAZOLE PO      Take 20 mg by mouth daily       * PREDNISONE PO      Take 40 mg by mouth daily Currently taking 10 mg daily       * predniSONE 1 MG tablet    DELTASONE    360 tablet    Take 1 tablet (1 mg) by mouth daily Take with 5 mg tablets to decrease by 1 mg/day per month.       * predniSONE 5 MG tablet    DELTASONE    90 tablet    Take 1 tablet (5 mg) by mouth daily Take with 1 mg tablets to decrease by 1 mg/day every month.       sulfamethoxazole-trimethoprim 400-80 MG per tablet    BACTRIM/SEPTRA     Take 1 tablet by mouth Reported on 2/17/2017       timolol 0.5 % ophthalmic solution    TIMOPTIC     Place 1 drop into both eyes daily       TYLENOL PO      Take 500 mg by mouth every 6 hours as needed for mild pain or fever       VITAMIN D3 PO      Take 1,000 Units by mouth daily       * Notice:  This list has 5 medication(s) that are the same as other medications prescribed for you. Read the directions carefully, and ask your doctor or other care provider to review them with you.

## 2017-02-17 NOTE — LETTER
Patient:  Christian Akers  :   1930  MRN:     [id number]        Mr.Anthony Akers  [address] [facility] [address]        2017    Dear ,    We are writing to inform you of your test results. Your inflammatory markers were completely normal. That is a good sign.      Resulted Orders   ALT   Result Value Ref Range    ALT 20 0 - 70 U/L   AST   Result Value Ref Range    AST 19 0 - 45 U/L   Bilirubin  total   Result Value Ref Range    Bilirubin Total 0.3 0.2 - 1.3 mg/dL   Bilirubin direct   Result Value Ref Range    Bilirubin Direct <0.1 0.0 - 0.2 mg/dL   Alkaline phosphatase   Result Value Ref Range    Alkaline Phosphatase 69 40 - 150 U/L   Albumin level   Result Value Ref Range    Albumin 3.3 (L) 3.4 - 5.0 g/dL   CBC with platelets differential   Result Value Ref Range    WBC 10.6 4.0 - 11.0 10e9/L    RBC Count 4.24 (L) 4.4 - 5.9 10e12/L    Hemoglobin 13.0 (L) 13.3 - 17.7 g/dL    Hematocrit 39.9 (L) 40.0 - 53.0 %    MCV 94 78 - 100 fl    MCH 30.7 26.5 - 33.0 pg    MCHC 32.6 31.5 - 36.5 g/dL    RDW 19.6 (H) 10.0 - 15.0 %    Platelet Count 273 150 - 450 10e9/L    Diff Method Automated Method     % Neutrophils 78.7 %    % Lymphocytes 9.6 %    % Monocytes 10.3 %    % Eosinophils 0.1 %    % Basophils 0.1 %    % Immature Granulocytes 1.2 %    Nucleated RBCs 0 0 /100    Absolute Neutrophil 8.3 1.6 - 8.3 10e9/L    Absolute Lymphocytes 1.0 0.8 - 5.3 10e9/L    Absolute Monocytes 1.1 0.0 - 1.3 10e9/L    Absolute Eosinophils 0.0 0.0 - 0.7 10e9/L    Absolute Basophils 0.0 0.0 - 0.2 10e9/L    Abs Immature Granulocytes 0.1 0 - 0.4 10e9/L    Absolute Nucleated RBC 0.0    Basic metabolic panel   Result Value Ref Range    Sodium 141 133 - 144 mmol/L    Potassium 4.4 3.4 - 5.3 mmol/L    Chloride 103 94 - 109 mmol/L    Carbon Dioxide 29 20 - 32 mmol/L    Anion Gap 10 3 - 14 mmol/L    Glucose 108 (H) 70 - 99 mg/dL    Urea Nitrogen 19 7 - 30 mg/dL    Creatinine 1.04 0.66 - 1.25 mg/dL    GFR Estimate 68  >60 mL/min/1.7m2      Comment:      Non  GFR Calc    GFR Estimate If Black 82 >60 mL/min/1.7m2      Comment:       GFR Calc    Calcium 9.1 8.5 - 10.1 mg/dL   CRP inflammation   Result Value Ref Range    CRP Inflammation 6.8 0.0 - 8.0 mg/L   ESR   Result Value Ref Range    Sed Rate 17 0 - 20 mm/h         [id number]  2/16/1930

## 2017-02-20 LAB
ALBUMIN MFR UR ELPH: 30.6 %
ALBUMIN SERPL ELPH-MCNC: 3.8 G/DL (ref 3.7–5.1)
ALPHA1 GLOB MFR UR ELPH: 16.1 %
ALPHA1 GLOB SERPL ELPH-MCNC: 0.3 G/DL (ref 0.2–0.4)
ALPHA2 GLOB MFR UR ELPH: 9.9 %
ALPHA2 GLOB SERPL ELPH-MCNC: 0.9 G/DL (ref 0.5–0.9)
B-GLOBULIN MFR UR ELPH: 24.6 %
B-GLOBULIN SERPL ELPH-MCNC: 0.6 G/DL (ref 0.6–1)
GAMMA GLOB MFR UR ELPH: 18.8 %
GAMMA GLOB SERPL ELPH-MCNC: 0.6 G/DL (ref 0.7–1.6)
IGA SERPL-MCNC: 119 MG/DL (ref 70–380)
IGG SERPL-MCNC: 702 MG/DL (ref 695–1620)
IGM SERPL-MCNC: 14 MG/DL (ref 60–265)
IMMUNOFIX ELP, URINE: NORMAL
IMMUNOFIXATION ELP: ABNORMAL
M PROTEIN MFR UR ELPH: 0 %
M PROTEIN SERPL ELPH-MCNC: 0.1 G/DL
PROT PATTERN SERPL ELPH-IMP: ABNORMAL
PROT PATTERN UR ELPH-IMP: ABNORMAL

## 2017-05-12 DIAGNOSIS — Z87.39 HISTORY OF CALCIUM PYROPHOSPHATE DEPOSITION DISEASE (CPPD): Primary | ICD-10-CM

## 2017-05-16 ENCOUNTER — TELEPHONE (OUTPATIENT)
Dept: RHEUMATOLOGY | Facility: CLINIC | Age: 82
End: 2017-05-16

## 2017-05-16 NOTE — TELEPHONE ENCOUNTER
Received call from Jaimee Echeverria Manager, requesting that I look into this matter, pt called and is confused about 2 appts needed for Rheumatology this week, he had called scheduling center and they referred onto her.  Pt had received a message cancelling appt with Dr. Tee Goyal for tomorrow.  Pt has already established care with Dr. Navarrete and has a follow up appt on Friday with him.  Pt would prefer to just see Dr. Navarrete, and I see no notes from Dr. Navarrete requesting that the pt follow up with Dr. Goyal.       Pt appears to have been scheduled with Dr. Goyal, several months ago, after being seen by Dr. Navarrete.  Unsure what happened    Have cancelled appt for  for Wednesday 5/17/17 at 10:00.  Pt very happy with this.  Would prefer to not come to clinic twice in one week due to distance.    Daily Rivera RN  Rheumatology Clinic

## 2017-05-19 ENCOUNTER — OFFICE VISIT (OUTPATIENT)
Dept: RHEUMATOLOGY | Facility: CLINIC | Age: 82
End: 2017-05-19
Attending: STUDENT IN AN ORGANIZED HEALTH CARE EDUCATION/TRAINING PROGRAM
Payer: MEDICARE

## 2017-05-19 VITALS
HEART RATE: 86 BPM | WEIGHT: 165.8 LBS | SYSTOLIC BLOOD PRESSURE: 150 MMHG | BODY MASS INDEX: 26.36 KG/M2 | OXYGEN SATURATION: 98 % | DIASTOLIC BLOOD PRESSURE: 79 MMHG | TEMPERATURE: 97.8 F

## 2017-05-19 DIAGNOSIS — Z87.39 HX OF CALCIUM PYROPHOSPHATE DEPOSITION DISEASE (CPPD): ICD-10-CM

## 2017-05-19 DIAGNOSIS — M85.80 OSTEOPENIA: ICD-10-CM

## 2017-05-19 DIAGNOSIS — Z51.81 ENCOUNTER FOR THERAPEUTIC DRUG MONITORING: ICD-10-CM

## 2017-05-19 DIAGNOSIS — M35.3 PMR (POLYMYALGIA RHEUMATICA) (H): ICD-10-CM

## 2017-05-19 DIAGNOSIS — Z87.39 HISTORY OF CALCIUM PYROPHOSPHATE DEPOSITION DISEASE (CPPD): ICD-10-CM

## 2017-05-19 DIAGNOSIS — Z87.39 HISTORY OF CALCIUM PYROPHOSPHATE DEPOSITION DISEASE (CPPD): Primary | ICD-10-CM

## 2017-05-19 LAB
ALBUMIN SERPL-MCNC: 3.6 G/DL (ref 3.4–5)
ALP SERPL-CCNC: 70 U/L (ref 40–150)
ALT SERPL W P-5'-P-CCNC: 17 U/L (ref 0–70)
ANION GAP SERPL CALCULATED.3IONS-SCNC: 9 MMOL/L (ref 3–14)
AST SERPL W P-5'-P-CCNC: 18 U/L (ref 0–45)
BASOPHILS # BLD AUTO: 0 10E9/L (ref 0–0.2)
BASOPHILS NFR BLD AUTO: 0.3 %
BILIRUB SERPL-MCNC: 0.6 MG/DL (ref 0.2–1.3)
BUN SERPL-MCNC: 16 MG/DL (ref 7–30)
CALCIUM SERPL-MCNC: 9.5 MG/DL (ref 8.5–10.1)
CHLORIDE SERPL-SCNC: 101 MMOL/L (ref 94–109)
CO2 SERPL-SCNC: 29 MMOL/L (ref 20–32)
CREAT SERPL-MCNC: 0.97 MG/DL (ref 0.66–1.25)
CRP SERPL-MCNC: 19.4 MG/L (ref 0–8)
DIFFERENTIAL METHOD BLD: NORMAL
EOSINOPHIL # BLD AUTO: 0 10E9/L (ref 0–0.7)
EOSINOPHIL NFR BLD AUTO: 0.2 %
ERYTHROCYTE [DISTWIDTH] IN BLOOD BY AUTOMATED COUNT: 15 % (ref 10–15)
ERYTHROCYTE [SEDIMENTATION RATE] IN BLOOD BY WESTERGREN METHOD: 15 MM/H (ref 0–20)
GFR SERPL CREATININE-BSD FRML MDRD: 73 ML/MIN/1.7M2
GLUCOSE SERPL-MCNC: 110 MG/DL (ref 70–99)
HCT VFR BLD AUTO: 43.7 % (ref 40–53)
HGB BLD-MCNC: 13.9 G/DL (ref 13.3–17.7)
IMM GRANULOCYTES # BLD: 0.1 10E9/L (ref 0–0.4)
IMM GRANULOCYTES NFR BLD: 0.6 %
LYMPHOCYTES # BLD AUTO: 0.8 10E9/L (ref 0.8–5.3)
LYMPHOCYTES NFR BLD AUTO: 7.8 %
MCH RBC QN AUTO: 30 PG (ref 26.5–33)
MCHC RBC AUTO-ENTMCNC: 31.8 G/DL (ref 31.5–36.5)
MCV RBC AUTO: 94 FL (ref 78–100)
MONOCYTES # BLD AUTO: 0.8 10E9/L (ref 0–1.3)
MONOCYTES NFR BLD AUTO: 7.8 %
NEUTROPHILS # BLD AUTO: 8.3 10E9/L (ref 1.6–8.3)
NEUTROPHILS NFR BLD AUTO: 83.3 %
NRBC # BLD AUTO: 0 10*3/UL
NRBC BLD AUTO-RTO: 0 /100
PLATELET # BLD AUTO: 247 10E9/L (ref 150–450)
POTASSIUM SERPL-SCNC: 4.4 MMOL/L (ref 3.4–5.3)
PROT SERPL-MCNC: 7 G/DL (ref 6.8–8.8)
RBC # BLD AUTO: 4.64 10E12/L (ref 4.4–5.9)
SODIUM SERPL-SCNC: 138 MMOL/L (ref 133–144)
WBC # BLD AUTO: 9.9 10E9/L (ref 4–11)

## 2017-05-19 PROCEDURE — 85025 COMPLETE CBC W/AUTO DIFF WBC: CPT | Performed by: STUDENT IN AN ORGANIZED HEALTH CARE EDUCATION/TRAINING PROGRAM

## 2017-05-19 PROCEDURE — 99212 OFFICE O/P EST SF 10 MIN: CPT | Mod: ZF

## 2017-05-19 PROCEDURE — 85652 RBC SED RATE AUTOMATED: CPT | Performed by: STUDENT IN AN ORGANIZED HEALTH CARE EDUCATION/TRAINING PROGRAM

## 2017-05-19 PROCEDURE — 86140 C-REACTIVE PROTEIN: CPT | Performed by: STUDENT IN AN ORGANIZED HEALTH CARE EDUCATION/TRAINING PROGRAM

## 2017-05-19 PROCEDURE — 80053 COMPREHEN METABOLIC PANEL: CPT | Performed by: STUDENT IN AN ORGANIZED HEALTH CARE EDUCATION/TRAINING PROGRAM

## 2017-05-19 PROCEDURE — 36415 COLL VENOUS BLD VENIPUNCTURE: CPT | Performed by: STUDENT IN AN ORGANIZED HEALTH CARE EDUCATION/TRAINING PROGRAM

## 2017-05-19 RX ORDER — ALENDRONATE SODIUM 70 MG/1
70 TABLET ORAL WEEKLY
Qty: 4 TABLET | Refills: 2 | Status: SHIPPED | OUTPATIENT
Start: 2017-05-19 | End: 2017-11-17

## 2017-05-19 RX ORDER — PREDNISONE 1 MG/1
1 TABLET ORAL DAILY
Qty: 360 TABLET | Refills: 2 | Status: SHIPPED | OUTPATIENT
Start: 2017-05-19 | End: 2017-11-17

## 2017-05-19 RX ORDER — PREDNISONE 5 MG/1
5 TABLET ORAL DAILY
Qty: 90 TABLET | Refills: 2 | Status: SHIPPED | OUTPATIENT
Start: 2017-05-19 | End: 2017-11-17

## 2017-05-19 ASSESSMENT — PAIN SCALES - GENERAL: PAINLEVEL: NO PAIN (0)

## 2017-05-19 NOTE — PATIENT INSTRUCTIONS
Decrease prednisone by 1 mg/day every month.    Get labs checked in 3 months and I will refill methotrexate if no lab abnormalities.    Follow up in 6 months, but call sooner if you are having problems.

## 2017-05-19 NOTE — LETTER
5/19/2017      RE: Christian Akers  [address]  [facility] [address]       Rheumatology Clinic Visit     Christian Akers MRN# [id number]   YOB: 1930 Age: 87 year old     Date of Visit: 5/19/2017    Primary care provider: Luana Martinez MD, [facility]  Referring provider: Luana Martinez MD, [facility]         Assessment and Plan:   #Polyarticular inflammatory arthritis-right knee aspiration 7/2016 with 13,000 WBC's, no crystals  #Chondrocalcinosis of wrists, history of acute pseudogout left knee in 2015, left knee aspiration with 2100 WBC's, intracellular CPP, history of crystalline-sounding left ankle monoarthritis with corticosteroid injection January of 2017  #History of neck/shoulder pain, myalgia, weakness, elevated inflammatory markers, bilateral temporal artery biopsy negative November of 2016 but after 4-5 months of high dose prednisone  #Osteopenia  #Long-term use of high doses of corticosteroids  #Monoclonal proteinemia  #History of unprovoked DVT/PE in 2015, treated with 12 months anticoagulation    Mr. Akers is doing well on his current prednisone taper. I am suspicious his underlying disease is CPPD however PMR is a possibility. Regardless, he is doing well.    -continue to decrease prednisone by 1 mg/day every month  -continue methotrexate 8 tablets weekly with the indication being steroid-sparing therapy in PMR  -calcium, vitamin D, and a bisphosphonate given his existing osteopenia  -repeat inflammatory markers today notable for normal ESR but elevated CRP of unclear significance given clinic well being  -methotrexate labs today  -if he is having flares of mono or oligoarticular arthritis I would be very suspicious of CPPD and in the future we could consider addition of Plaquenil or colchicine  -follow up in 6 months with labs closer to home in 3 months      Seen and discussed with Dr. Mann.    Finn Navarrete,  MD  Rheumatology Fellow  [phone]    CC: Luana Martinez MD, [facility]          History of Present Illness:   From initial HPI: Christian Akers is a 87 year old male who presented for a second opinion of polymyalgia rheumatica vs giant cell arteritis and polyarticular inflammatory arthritis. Mr. Akers had his left knee replaced 4/25/16 and did well with rehab until Memorial Day when he developed bilateral shoulder and neck pain that got progressively worse. He also had stiffness in his hands, wrists, elbows, feet, ankles, and knees. He also had difficulty getting out of chairs, bed, and moving around. He was seen by several providers and the ED. He had been given baclofen, which did not help, and then NSAID's which also did not help. In June of 2016 he developed weakness and poor appetite and was noted to be febrile and tachycardic. He was evaluated in [place] and found to have a hemoglobin of 9.8 (around 2 grams lower than last prior) and a white count of 14.1. He was hospitalized and had an extensive work up including CT abdomen/pelvis, CTA, CT head, endoscopy, and colonoscopy. His endoscopy showed what was likely reactive gastropathy from the non-steroidals. CTA showed bibasilar infiltrates with small effusions and he was ultimately treated for pneumonia. He was discharged on a PPi and cessation of NSAID's. Because his arthritic symptoms did not saundra he was seen by Dr. Luana Martinez at [facility] 6/21/16 who felt his symptoms were most likely related to crystalline arthritis and he was given prednisone 10 mg per day and his right knee was aspirated. I do not have records of the reasoning behind this decision, but between June and mid-July his prednisone was increased to 60 mg daily and then tapered down to 40 mg daily with complete resolution of his symptoms. It was noted that he had persistently low albumin (low 3'willian) but elevated inflammatory  markers despite these high doses. He was placed on methotrexate with a working diagnosis of seronegative rheumatoid arthritis in mid-July and he was tapered down to 10-12.5 mg of prednisone daily which apparently resolved his symptoms. He was appropriately on calcium and vitamin D, Fosamax, and Bactrim prophylaxis during this time when his prednisone dose was high. In October his CRP was 43.7 on 12.5 mg of prednisone daily but he felt well and was tapered down to 10 mg daily. However in mid-November he began to experience fatigue but denied other GCA symptoms. His prednisone was increased back to 50 mg daily and he had a bilateral temporal artery biopsy which was reportedly negative. Another CT chest/abdomen/pelvis was negative for malignancy. He was noted to have a monoclonal proteinemia that was thought insignificant by heme/onc. CRP at his November visit when on 10 mg of prednisone daily was 96.7. His daughter also notes he had a urinary tract infection at some point in this interim, but I note several negative UA's in his paper chart throughout the entire fall. He was then referred to [facility] for a second opinion.    On his initial visit he was taking prednisone 40 mg daily and felt essentially normal. His prednisone was decreased to 20 mg daily and further tapered down to 10 mg in 2.5 mg/day increments every other week. He followed up with Dr. Martinez and Ms. Garcia of [facility] rheumatology who noted that his CRP was persistently elevated but he was feeling well. Given the discrepancy he was seen an an expedited basis here in [facility].    Last seen: 2/17/17  Interim history:  Mr. Akers is doing great. He is down to 7 mg of prednisone daily and is having no problems. He is tolerating his methotrexate. He has not had any infections or required antibiotics since his last visit. No joints are painful or swollen. His weight is stable, maybe a little bit up. Plans on visiting his lady friend's son in [facility]  [place] this summer. No other plans. Exercising 30 minutes daily after breakfast. Bowls once weekly.         Review of Systems (other than HPI):   Constitutional: negative  Skin: negative  Eyes: negative  Ears/Nose/Throat: negative  Respiratory: negative  Cardiovascular: negative  Gastrointestinal: negative  Genitourinary: negative  Musculoskeletal: negative  Neurologic: negative  Psychiatric: negative  Hematologic/Lymphatic/Immunologic: negative  Endocrine: negative          Past Medical History:   HTN  HLD  BPH with negative prostate biopsies in   RBBB  Unprovoked PE 2015, treated with anticoagulation for 12 months  Seronegative inflammatory arthritis  OA  UTI in  with hemorrhagic cystitis in   Borderline glaucoma  Acute pseudogout of left knee 2015   Osteopenia    Left inguinal hernia repair  Cataract repair  Left TKA 2016  Bilateral temporal artery biopsies 2016-negative         Social History:   Former smoker, quit 40+ years ago  1 drink per week   with 5 children  Retired   Still very active with traveling, regular exercise, golf  Lives in [place] with his long-time female friend         Family History:   Father  of cancer at 83  Mother  at 83 with cancer  Brother  at 76 of prostate cancer  Sister  at age 54 of unknown cause, diagnosed with cancer  Daughter alive and well  Son alive and well  5 brothers, 2 sisters alive and healthy         Allergies:   Cialis-stomach upset         Medications:     Current Outpatient Prescriptions   Medication Sig Dispense Refill     methotrexate 2.5 MG tablet CHEMO Take 8 tablets (20 mg) by mouth once a week Take 8 tablets per week 32 tablet 2     predniSONE (DELTASONE) 1 MG tablet Take 1 tablet (1 mg) by mouth daily Take with 5 mg tablets to decrease by 1 mg/day per month. 360 tablet 2     predniSONE (DELTASONE) 5 MG tablet Take 1 tablet (5 mg) by mouth daily Take with 1 mg tablets to decrease by 1 mg/day every  month. 90 tablet 2     alendronate (FOSAMAX) 70 MG tablet Take 1 tablet (70 mg) by mouth once a week Takes every Sunday 4 tablet 2     AMLODIPINE BESYLATE PO Take 5 mg by mouth daily       calcium-vitamin D (CALTRATE) 600-400 MG-UNIT per tablet Take 1 tablet by mouth 2 times daily       Cholecalciferol (VITAMIN D3 PO) Take 1,000 Units by mouth daily       tamsulosin (FLOMAX) 0.4 MG capsule Take 0.8 mg by mouth daily       OMEPRAZOLE PO Take 20 mg by mouth daily       FOLIC ACID PO Take 800 mcg by mouth daily       PREDNISONE PO Take 40 mg by mouth daily Currently taking 10 mg daily       Methotrexate Sodium (METHOTREXATE PO) Take 2.5 mg by mouth once a week Patient takes 8 capsules every Friday       brimonidine (ALPHAGAN-P) 0.15 % ophthalmic solution Place 1 drop into both eyes daily       timolol (TIMOPTIC) 0.5 % ophthalmic solution Place 1 drop into both eyes daily       ASPIRIN PO Take 81 mg by mouth daily       sulfamethoxazole-trimethoprim (BACTRIM/SEPTRA) 400-80 MG per tablet Take 1 tablet by mouth Reported on 2/17/2017       Acetaminophen (TYLENOL PO) Take 500 mg by mouth every 6 hours as needed for mild pain or fever       [DISCONTINUED] methotrexate 2.5 MG tablet CHEMO Take 8 tablets (20 mg) by mouth once a week Take 8 tablets per week 32 tablet 2     [DISCONTINUED] Alendronate Sodium (FOSAMAX PO) Take 70 mg by mouth once a week Takes every Sunday            Physical Exam:   Blood pressure 150/79, pulse 86, temperature 97.8  F (36.6  C), temperature source Oral, weight 75.2 kg (165 lb 12.8 oz), SpO2 98 %.  Wt Readings from Last 4 Encounters:   05/19/17 75.2 kg (165 lb 12.8 oz)   02/17/17 74.8 kg (165 lb)   12/30/16 72.5 kg (159 lb 14.4 oz)     Constitutional: well-developed, appearing stated age; cooperative  Eyes: normal EOM, PERRLA, vision, conjunctiva, sclera  ENT: normal external ears, nose, hearing, lips, teeth, gums, throat, no mucous membrane lesions, normal saliva pool  Neck: no mass or thyroid  "enlargement  Resp: lungs clear to auscultation  CV: RRR, no murmurs, rubs or gallops, no edema  GI: no abdominal mass or tenderness, no organomegaly   : not tested  Lymph: no cervical, supraclavicular, or epitrochlear nodes  MS: The TMJ, neck, shoulder, elbow, wrist, MCP/PIP/DIP, spine, hip, knee, ankle, and foot MTP/IP joints were examined and found normal. No active synovitis or altered joint anatomy. Full joint ROM. Normal  strength. No dactylitis,  tenosynovitis, enthesopathy. Left knee arthrotomy scar.   Skin: no nail pitting, alopecia, rash, nodules or lesions  Neuro: normal cranial nerves, strength, sensation, DTR's  Psych: normal judgement, orientation, memory, affect         Data:   Outside Data:  Hep B/C serology negative    Lyme, Anaplasma negative  RF negative  CAMPBELL negative  Babesia negative  Ehrlichia negative  Parvovirus PCR negative  EBV IgG positive, IgM negative  Monoscreen negative  Immunoglobulins with normal IgA/G, low IgM (22, 50 lower limit of normal)  PTH 14.4 (normal)  Ferritin 1137.3 6/19/16    Left knee aspiration 11/29/15:  2106 cells, 95% PMN's, intracellular CPPD    Right knee aspiration June 2016: 13,830 cells, 92% PMN's, no crystals seen   Cr 1.1-1.2  HGB 10-11  WBC's 14-15 with PMN predominance, lymphopenia    Outside Radiology:  Echocardiogram 6/16/16 for \"fever unexplained\"  The left ventricle is normal size.  The left ventricular systolic function is hyperdynamic.  There is mild concentric hypertrophy.  Left atrium is mildly enlarged by volume.  The aortic valve is mildly sclerotic.  Moderate mitral valve annular calcification.  Borderline mitral valve stenosis.  Estimated RV systolic pressure is 43 mm Hg above right atrial pressure.  Small pericardial effusion.  There is no echocardiographic evidence of endocarditis.    CT abdomen/pelvis   1. No acute obstructive or inflammatory abdominopelvic process.  2. Moderate to severe colonic diverticulosis without diverticulitis. " Mild constipation.  3. Mild cardiomegaly, aortic valve and mitral annular calcifications, better assessed by nonemergent cardiac echo. Also coronary arterial calcifications.  4. Moderate generalized osteopenia. Outpatient DEXA scan recommended.  5. Mild diffuse bladder mural thickening versus underdistention could be due to chronic bladder outlet obstruction due to prostatomegaly. Infectious cystitis felt to be less likely.     CTA:  1. Mild-to-moderate bilateral basilar infiltrates with small effusions.  2. In the major pulmonary arteries and first divisions there is no evidence for pulmonary emboli. The distal branches are difficult to see.    Colonoscopy:  Rectal exam was preformed and was Normal. The olympus videoendoscope was inserted into the anus and passed without difficulty to the cecum. TI normal. Cecum was identified by coelesence of the tinea, visualization of the appendiceal orifice and visualization of the ileocecal valve. The scope was slowly withdrawn and all walls of the colon were visualized. No polyps or masses or inflammation was seen. Sigmoid Diverticulosis was identified. Upon reaching the rectum the scope was retroverted and minimal internal hemorrhoids were identified. No abnormalities were noted.    Endoscopy:  The olympus scope was inserted via the oropharyx passed without difficulty via the esophagus and into the stomach. The scope was then advanced into the duodenum. Duodenum was normal. Duodenum biopsies were not obtained. The scope was slowly withdrawn the duodenum and stomach was examined. Forward and retroflexion views were performed. Stomach was had preplyoic gastritis. Gastric biopsies were obtained. A sliding hiatal hernia was identified. The GE junction was at 36 cm. GE junction had bowen's like changes. Gastro-esophageal biopsies were obtained. The scope was removed from the patient and esophagus examined. The esophagus was normal.    Endoscopic biopsies:  A) Specimen  "designated \"prepyloric\", biopsy:  Reactive gastropathy with focal erosion (see comment)  No Helicobacter pylori organisms identified on immunohistochemical  stain  B) GE junction, biopsy:  Mildly inflamed gastric cardiac type mucosa with scant squamous  epithelium  No goblet cell metaplasia identified      Shoulder x-ray: moderate severe left GH arthritis, moderate right AC OA with spurring    Bilateral hand x-ray: bilateral OA of DIP's and MCP's    Bilateral wrist x-rays in PACS: chondrocalcinosis of bilateral triangular cartilages    Results for orders placed or performed in visit on 02/17/17   ALT   Result Value Ref Range    ALT 20 0 - 70 U/L   AST   Result Value Ref Range    AST 19 0 - 45 U/L   Bilirubin  total   Result Value Ref Range    Bilirubin Total 0.3 0.2 - 1.3 mg/dL   Bilirubin direct   Result Value Ref Range    Bilirubin Direct <0.1 0.0 - 0.2 mg/dL   Alkaline phosphatase   Result Value Ref Range    Alkaline Phosphatase 69 40 - 150 U/L   Albumin level   Result Value Ref Range    Albumin 3.3 (L) 3.4 - 5.0 g/dL   CBC with platelets differential   Result Value Ref Range    WBC 10.6 4.0 - 11.0 10e9/L    RBC Count 4.24 (L) 4.4 - 5.9 10e12/L    Hemoglobin 13.0 (L) 13.3 - 17.7 g/dL    Hematocrit 39.9 (L) 40.0 - 53.0 %    MCV 94 78 - 100 fl    MCH 30.7 26.5 - 33.0 pg    MCHC 32.6 31.5 - 36.5 g/dL    RDW 19.6 (H) 10.0 - 15.0 %    Platelet Count 273 150 - 450 10e9/L    Diff Method Automated Method     % Neutrophils 78.7 %    % Lymphocytes 9.6 %    % Monocytes 10.3 %    % Eosinophils 0.1 %    % Basophils 0.1 %    % Immature Granulocytes 1.2 %    Nucleated RBCs 0 0 /100    Absolute Neutrophil 8.3 1.6 - 8.3 10e9/L    Absolute Lymphocytes 1.0 0.8 - 5.3 10e9/L    Absolute Monocytes 1.1 0.0 - 1.3 10e9/L    Absolute Eosinophils 0.0 0.0 - 0.7 10e9/L    Absolute Basophils 0.0 0.0 - 0.2 10e9/L    Abs Immature Granulocytes 0.1 0 - 0.4 10e9/L    Absolute Nucleated RBC 0.0    Basic metabolic panel   Result Value Ref Range    " Sodium 141 133 - 144 mmol/L    Potassium 4.4 3.4 - 5.3 mmol/L    Chloride 103 94 - 109 mmol/L    Carbon Dioxide 29 20 - 32 mmol/L    Anion Gap 10 3 - 14 mmol/L    Glucose 108 (H) 70 - 99 mg/dL    Urea Nitrogen 19 7 - 30 mg/dL    Creatinine 1.04 0.66 - 1.25 mg/dL    GFR Estimate 68 >60 mL/min/1.7m2    GFR Estimate If Black 82 >60 mL/min/1.7m2    Calcium 9.1 8.5 - 10.1 mg/dL   CRP inflammation   Result Value Ref Range    CRP Inflammation 6.8 0.0 - 8.0 mg/L   ESR   Result Value Ref Range    Sed Rate 17 0 - 20 mm/h   Protein electrophoresis   Result Value Ref Range    Albumin Fraction 3.8 3.7 - 5.1 g/dL    Alpha 1 Fraction 0.3 0.2 - 0.4 g/dL    Alpha 2 Fraction 0.9 0.5 - 0.9 g/dL    Beta Fraction 0.6 0.6 - 1.0 g/dL    Gamma Fraction 0.6 (L) 0.7 - 1.6 g/dL    Monoclonal Peak 0.1 (H) 0.0 g/dL    ELP Interpretation:       Two very small monoclonal proteins (each about 0.1 g/dL or less) seen in the   gamma fraction.  See immunofixation report on same specimen. Pathologic   significance requires clinical correlation.  JOYCE Monroy M.D., Ph.D.,   Pathologist ().     Protein Immunofixation Serum   Result Value Ref Range    Immunofixation ELP       (Note)  Monoclonal IgG immunoglobulin of kappa light chain type. Very  small monoclonal IgG immunoglobulin of lambda light chain type.  Monoclonal antibody therapeutics (e.g. Daratumumab) may appear as  monoclonal proteins on serum electrophoresis and immunofixation.  Results should be interpreted with caution if the patient is  receiving monoclonal antibody therapy. Pathological significance  requires clinical correlation.  JOYCE Monroy M.D., Ph.D.,  Pathologist ([phone])         695 - 1620 mg/dL     70 - 380 mg/dL    IGM 14 (L) 60 - 265 mg/dL   Protein immunofixation urine   Result Value Ref Range    Immunofix ELP Urine       (Note)  Very small monoclonal free immunoglobulin light chain of kappa  chain type. Pathological significance requires  clinical correlation.  JOYCE Monroy M.D., Ph.D., Pathologist ([phone])       Protein electrophoresis random urine   Result Value Ref Range    Albumin Fraction Urine 30.6 (H) 0 %    Alpha 1 Fraction Urine 16.1 (H) 0 %    Alpha 2 Fraction Urine 9.9 (H) 0 %    Beta Fraction Urine 24.6 (H) 0 %    Gamma Fraction Urine 18.8 (H) 0 %    Monoclonal Peak Urine 0.0 0% %    ELP Interpretation Urine       Albumin and globulins present. No obvious monoclonal seen by urine   electrophoresis, however a monoclonal protein was seen in this sample by   immunofixation which is a more sensitive method for monoclonal immunoglobulin   detection.Pathological significance requires clinical correlation.  JOYCE Monroy M.D., Ph.D., Pathologist ()       Reviewed Rheumatology lab flowsheet    I saw the patient with the fellow.  My exam and recomendations are as described.      Andres Mann MD

## 2017-05-19 NOTE — NURSING NOTE
"Chief Complaint   Patient presents with     RECHECK     Ra follow up       Initial /79 (BP Location: Right arm, Patient Position: Chair, Cuff Size: Adult Regular)  Pulse 86  Temp 97.8  F (36.6  C) (Oral)  Wt 75.2 kg (165 lb 12.8 oz)  SpO2 98%  BMI 26.36 kg/m2 Estimated body mass index is 26.36 kg/(m^2) as calculated from the following:    Height as of 2/17/17: 1.689 m (5' 6.5\").    Weight as of this encounter: 75.2 kg (165 lb 12.8 oz).  Medication Reconciliation: complete   JJ RODRÍGUEZ CMA      "

## 2017-05-19 NOTE — PROGRESS NOTES
Rheumatology Clinic Visit     Christian Akers MRN# [id number]   YOB: 1930 Age: 87 year old     Date of Visit: 5/19/2017    Primary care provider: Luana Martinez MD, [facility]  Referring provider: Luana Martinez MD, [facility]         Assessment and Plan:   #Polyarticular inflammatory arthritis-right knee aspiration 7/2016 with 13,000 WBC's, no crystals  #Chondrocalcinosis of wrists, history of acute pseudogout left knee in 2015, left knee aspiration with 2100 WBC's, intracellular CPP, history of crystalline-sounding left ankle monoarthritis with corticosteroid injection January of 2017  #History of neck/shoulder pain, myalgia, weakness, elevated inflammatory markers, bilateral temporal artery biopsy negative November of 2016 but after 4-5 months of high dose prednisone  #Osteopenia  #Long-term use of high doses of corticosteroids  #Monoclonal proteinemia  #History of unprovoked DVT/PE in 2015, treated with 12 months anticoagulation    Mr. Akers is doing well on his current prednisone taper. I am suspicious his underlying disease is CPPD however PMR is a possibility. Regardless, he is doing well.    -continue to decrease prednisone by 1 mg/day every month  -continue methotrexate 8 tablets weekly with the indication being steroid-sparing therapy in PMR  -calcium, vitamin D, and a bisphosphonate given his existing osteopenia  -repeat inflammatory markers today notable for normal ESR but elevated CRP of unclear significance given clinic well being  -methotrexate labs today  -if he is having flares of mono or oligoarticular arthritis I would be very suspicious of CPPD and in the future we could consider addition of Plaquenil or colchicine  -follow up in 6 months with labs closer to home in 3 months      Seen and discussed with Dr. Mann.    Finn Navarrete MD  Rheumatology Fellow  [phone]    CC: Luana Martinez MD, [facility]          History of Present Illness:   From initial HPI: Christian Akers is a 87 year old male who presented for a second opinion of polymyalgia rheumatica vs giant cell arteritis and polyarticular inflammatory arthritis. Mr. Akers had his left knee replaced 4/25/16 and did well with rehab until Memorial Day when he developed bilateral shoulder and neck pain that got progressively worse. He also had stiffness in his hands, wrists, elbows, feet, ankles, and knees. He also had difficulty getting out of chairs, bed, and moving around. He was seen by several providers and the ED. He had been given baclofen, which did not help, and then NSAID's which also did not help. In June of 2016 he developed weakness and poor appetite and was noted to be febrile and tachycardic. He was evaluated in [place] and found to have a hemoglobin of 9.8 (around 2 grams lower than last prior) and a white count of 14.1. He was hospitalized and had an extensive work up including CT abdomen/pelvis, CTA, CT head, endoscopy, and colonoscopy. His endoscopy showed what was likely reactive gastropathy from the non-steroidals. CTA showed bibasilar infiltrates with small effusions and he was ultimately treated for pneumonia. He was discharged on a PPi and cessation of NSAID's. Because his arthritic symptoms did not saundra he was seen by Dr. Luana Martinez at [facility] 6/21/16 who felt his symptoms were most likely related to crystalline arthritis and he was given prednisone 10 mg per day and his right knee was aspirated. I do not have records of the reasoning behind this decision, but between June and mid-July his prednisone was increased to 60 mg daily and then tapered down to 40 mg daily with complete resolution of his symptoms. It was noted that he had persistently low albumin (low 3'willian) but elevated inflammatory markers despite these high doses. He was placed on methotrexate with a working diagnosis of  seronegative rheumatoid arthritis in mid-July and he was tapered down to 10-12.5 mg of prednisone daily which apparently resolved his symptoms. He was appropriately on calcium and vitamin D, Fosamax, and Bactrim prophylaxis during this time when his prednisone dose was high. In October his CRP was 43.7 on 12.5 mg of prednisone daily but he felt well and was tapered down to 10 mg daily. However in mid-November he began to experience fatigue but denied other GCA symptoms. His prednisone was increased back to 50 mg daily and he had a bilateral temporal artery biopsy which was reportedly negative. Another CT chest/abdomen/pelvis was negative for malignancy. He was noted to have a monoclonal proteinemia that was thought insignificant by heme/onc. CRP at his November visit when on 10 mg of prednisone daily was 96.7. His daughter also notes he had a urinary tract infection at some point in this interim, but I note several negative UA's in his paper chart throughout the entire fall. He was then referred to [facility] [place] for a second opinion.    On his initial visit he was taking prednisone 40 mg daily and felt essentially normal. His prednisone was decreased to 20 mg daily and further tapered down to 10 mg in 2.5 mg/day increments every other week. He followed up with Dr. Martinez and Ms. Garcia of [facility] who noted that his CRP was persistently elevated but he was feeling well. Given the discrepancy he was seen an an expedited basis here in the [facility].    Last seen: 2/17/17  Interim history:  Mr. Akers is doing great. He is down to 7 mg of prednisone daily and is having no problems. He is tolerating his methotrexate. He has not had any infections or required antibiotics since his last visit. No joints are painful or swollen. His weight is stable, maybe a little bit up. Plans on visiting his lady friend's son in [facility] this summer. No other plans. Exercising 30 minutes daily after breakfast. Bowls  once weekly.         Review of Systems (other than HPI):   Constitutional: negative  Skin: negative  Eyes: negative  Ears/Nose/Throat: negative  Respiratory: negative  Cardiovascular: negative  Gastrointestinal: negative  Genitourinary: negative  Musculoskeletal: negative  Neurologic: negative  Psychiatric: negative  Hematologic/Lymphatic/Immunologic: negative  Endocrine: negative          Past Medical History:   HTN  HLD  BPH with negative prostate biopsies in   RBBB  Unprovoked PE 2015, treated with anticoagulation for 12 months  Seronegative inflammatory arthritis  OA  UTI in  with hemorrhagic cystitis in   Borderline glaucoma  Acute pseudogout of left knee 2015   Osteopenia    Left inguinal hernia repair  Cataract repair  Left TKA 2016  Bilateral temporal artery biopsies 2016-negative         Social History:   Former smoker, quit 40+ years ago  1 drink per week   with 5 children  Retired   Still very active with traveling, regular exercise, golf  Lives in [place] with his long-time female friend         Family History:   Father  of cancer at 83  Mother  at 83 with cancer  Brother  at 76 of prostate cancer  Sister  at age 54 of unknown cause, diagnosed with cancer  Daughter alive and well  Son alive and well  5 brothers, 2 sisters alive and healthy         Allergies:   Cialis-stomach upset         Medications:     Current Outpatient Prescriptions   Medication Sig Dispense Refill     methotrexate 2.5 MG tablet CHEMO Take 8 tablets (20 mg) by mouth once a week Take 8 tablets per week 32 tablet 2     predniSONE (DELTASONE) 1 MG tablet Take 1 tablet (1 mg) by mouth daily Take with 5 mg tablets to decrease by 1 mg/day per month. 360 tablet 2     predniSONE (DELTASONE) 5 MG tablet Take 1 tablet (5 mg) by mouth daily Take with 1 mg tablets to decrease by 1 mg/day every month. 90 tablet 2     alendronate (FOSAMAX) 70 MG tablet Take 1 tablet (70 mg) by mouth  once a week Takes every Sunday 4 tablet 2     AMLODIPINE BESYLATE PO Take 5 mg by mouth daily       calcium-vitamin D (CALTRATE) 600-400 MG-UNIT per tablet Take 1 tablet by mouth 2 times daily       Cholecalciferol (VITAMIN D3 PO) Take 1,000 Units by mouth daily       tamsulosin (FLOMAX) 0.4 MG capsule Take 0.8 mg by mouth daily       OMEPRAZOLE PO Take 20 mg by mouth daily       FOLIC ACID PO Take 800 mcg by mouth daily       PREDNISONE PO Take 40 mg by mouth daily Currently taking 10 mg daily       Methotrexate Sodium (METHOTREXATE PO) Take 2.5 mg by mouth once a week Patient takes 8 capsules every Friday       brimonidine (ALPHAGAN-P) 0.15 % ophthalmic solution Place 1 drop into both eyes daily       timolol (TIMOPTIC) 0.5 % ophthalmic solution Place 1 drop into both eyes daily       ASPIRIN PO Take 81 mg by mouth daily       sulfamethoxazole-trimethoprim (BACTRIM/SEPTRA) 400-80 MG per tablet Take 1 tablet by mouth Reported on 2/17/2017       Acetaminophen (TYLENOL PO) Take 500 mg by mouth every 6 hours as needed for mild pain or fever       [DISCONTINUED] methotrexate 2.5 MG tablet CHEMO Take 8 tablets (20 mg) by mouth once a week Take 8 tablets per week 32 tablet 2     [DISCONTINUED] Alendronate Sodium (FOSAMAX PO) Take 70 mg by mouth once a week Takes every Sunday            Physical Exam:   Blood pressure 150/79, pulse 86, temperature 97.8  F (36.6  C), temperature source Oral, weight 75.2 kg (165 lb 12.8 oz), SpO2 98 %.  Wt Readings from Last 4 Encounters:   05/19/17 75.2 kg (165 lb 12.8 oz)   02/17/17 74.8 kg (165 lb)   12/30/16 72.5 kg (159 lb 14.4 oz)     Constitutional: well-developed, appearing stated age; cooperative  Eyes: normal EOM, PERRLA, vision, conjunctiva, sclera  ENT: normal external ears, nose, hearing, lips, teeth, gums, throat, no mucous membrane lesions, normal saliva pool  Neck: no mass or thyroid enlargement  Resp: lungs clear to auscultation  CV: RRR, no murmurs, rubs or gallops, no  "edema  GI: no abdominal mass or tenderness, no organomegaly   : not tested  Lymph: no cervical, supraclavicular, or epitrochlear nodes  MS: The TMJ, neck, shoulder, elbow, wrist, MCP/PIP/DIP, spine, hip, knee, ankle, and foot MTP/IP joints were examined and found normal. No active synovitis or altered joint anatomy. Full joint ROM. Normal  strength. No dactylitis,  tenosynovitis, enthesopathy. Left knee arthrotomy scar.   Skin: no nail pitting, alopecia, rash, nodules or lesions  Neuro: normal cranial nerves, strength, sensation, DTR's  Psych: normal judgement, orientation, memory, affect         Data:   Outside Data:  Hep B/C serology negative    Lyme, Anaplasma negative  RF negative  CAMPBELL negative  Babesia negative  Ehrlichia negative  Parvovirus PCR negative  EBV IgG positive, IgM negative  Monoscreen negative  Immunoglobulins with normal IgA/G, low IgM (22, 50 lower limit of normal)  PTH 14.4 (normal)  Ferritin 1137.3 6/19/16    Left knee aspiration 11/29/15:  2106 cells, 95% PMN's, intracellular CPPD    Right knee aspiration June 2016: 13,830 cells, 92% PMN's, no crystals seen   Cr 1.1-1.2  HGB 10-11  WBC's 14-15 with PMN predominance, lymphopenia    Outside Radiology:  Echocardiogram 6/16/16 for \"fever unexplained\"  The left ventricle is normal size.  The left ventricular systolic function is hyperdynamic.  There is mild concentric hypertrophy.  Left atrium is mildly enlarged by volume.  The aortic valve is mildly sclerotic.  Moderate mitral valve annular calcification.  Borderline mitral valve stenosis.  Estimated RV systolic pressure is 43 mm Hg above right atrial pressure.  Small pericardial effusion.  There is no echocardiographic evidence of endocarditis.    CT abdomen/pelvis   1. No acute obstructive or inflammatory abdominopelvic process.  2. Moderate to severe colonic diverticulosis without diverticulitis. Mild constipation.  3. Mild cardiomegaly, aortic valve and mitral annular calcifications, " "better assessed by nonemergent cardiac echo. Also coronary arterial calcifications.  4. Moderate generalized osteopenia. Outpatient DEXA scan recommended.  5. Mild diffuse bladder mural thickening versus underdistention could be due to chronic bladder outlet obstruction due to prostatomegaly. Infectious cystitis felt to be less likely.     CTA:  1. Mild-to-moderate bilateral basilar infiltrates with small effusions.  2. In the major pulmonary arteries and first divisions there is no evidence for pulmonary emboli. The distal branches are difficult to see.    Colonoscopy:  Rectal exam was preformed and was Normal. The olympus videoendoscope was inserted into the anus and passed without difficulty to the cecum. TI normal. Cecum was identified by coelesence of the tinea, visualization of the appendiceal orifice and visualization of the ileocecal valve. The scope was slowly withdrawn and all walls of the colon were visualized. No polyps or masses or inflammation was seen. Sigmoid Diverticulosis was identified. Upon reaching the rectum the scope was retroverted and minimal internal hemorrhoids were identified. No abnormalities were noted.    Endoscopy:  The olympus scope was inserted via the oropharyx passed without difficulty via the esophagus and into the stomach. The scope was then advanced into the duodenum. Duodenum was normal. Duodenum biopsies were not obtained. The scope was slowly withdrawn the duodenum and stomach was examined. Forward and retroflexion views were performed. Stomach was had preplyoic gastritis. Gastric biopsies were obtained. A sliding hiatal hernia was identified. The GE junction was at 36 cm. GE junction had bowen's like changes. Gastro-esophageal biopsies were obtained. The scope was removed from the patient and esophagus examined. The esophagus was normal.    Endoscopic biopsies:  A) Specimen designated \"prepyloric\", biopsy:  Reactive gastropathy with focal erosion (see comment)  No " Helicobacter pylori organisms identified on immunohistochemical  stain  B) GE junction, biopsy:  Mildly inflamed gastric cardiac type mucosa with scant squamous  epithelium  No goblet cell metaplasia identified      Shoulder x-ray: moderate severe left GH arthritis, moderate right AC OA with spurring    Bilateral hand x-ray: bilateral OA of DIP's and MCP's    Bilateral wrist x-rays in PACS: chondrocalcinosis of bilateral triangular cartilages    Results for orders placed or performed in visit on 02/17/17   ALT   Result Value Ref Range    ALT 20 0 - 70 U/L   AST   Result Value Ref Range    AST 19 0 - 45 U/L   Bilirubin  total   Result Value Ref Range    Bilirubin Total 0.3 0.2 - 1.3 mg/dL   Bilirubin direct   Result Value Ref Range    Bilirubin Direct <0.1 0.0 - 0.2 mg/dL   Alkaline phosphatase   Result Value Ref Range    Alkaline Phosphatase 69 40 - 150 U/L   Albumin level   Result Value Ref Range    Albumin 3.3 (L) 3.4 - 5.0 g/dL   CBC with platelets differential   Result Value Ref Range    WBC 10.6 4.0 - 11.0 10e9/L    RBC Count 4.24 (L) 4.4 - 5.9 10e12/L    Hemoglobin 13.0 (L) 13.3 - 17.7 g/dL    Hematocrit 39.9 (L) 40.0 - 53.0 %    MCV 94 78 - 100 fl    MCH 30.7 26.5 - 33.0 pg    MCHC 32.6 31.5 - 36.5 g/dL    RDW 19.6 (H) 10.0 - 15.0 %    Platelet Count 273 150 - 450 10e9/L    Diff Method Automated Method     % Neutrophils 78.7 %    % Lymphocytes 9.6 %    % Monocytes 10.3 %    % Eosinophils 0.1 %    % Basophils 0.1 %    % Immature Granulocytes 1.2 %    Nucleated RBCs 0 0 /100    Absolute Neutrophil 8.3 1.6 - 8.3 10e9/L    Absolute Lymphocytes 1.0 0.8 - 5.3 10e9/L    Absolute Monocytes 1.1 0.0 - 1.3 10e9/L    Absolute Eosinophils 0.0 0.0 - 0.7 10e9/L    Absolute Basophils 0.0 0.0 - 0.2 10e9/L    Abs Immature Granulocytes 0.1 0 - 0.4 10e9/L    Absolute Nucleated RBC 0.0    Basic metabolic panel   Result Value Ref Range    Sodium 141 133 - 144 mmol/L    Potassium 4.4 3.4 - 5.3 mmol/L    Chloride 103 94 - 109 mmol/L     Carbon Dioxide 29 20 - 32 mmol/L    Anion Gap 10 3 - 14 mmol/L    Glucose 108 (H) 70 - 99 mg/dL    Urea Nitrogen 19 7 - 30 mg/dL    Creatinine 1.04 0.66 - 1.25 mg/dL    GFR Estimate 68 >60 mL/min/1.7m2    GFR Estimate If Black 82 >60 mL/min/1.7m2    Calcium 9.1 8.5 - 10.1 mg/dL   CRP inflammation   Result Value Ref Range    CRP Inflammation 6.8 0.0 - 8.0 mg/L   ESR   Result Value Ref Range    Sed Rate 17 0 - 20 mm/h   Protein electrophoresis   Result Value Ref Range    Albumin Fraction 3.8 3.7 - 5.1 g/dL    Alpha 1 Fraction 0.3 0.2 - 0.4 g/dL    Alpha 2 Fraction 0.9 0.5 - 0.9 g/dL    Beta Fraction 0.6 0.6 - 1.0 g/dL    Gamma Fraction 0.6 (L) 0.7 - 1.6 g/dL    Monoclonal Peak 0.1 (H) 0.0 g/dL    ELP Interpretation:       Two very small monoclonal proteins (each about 0.1 g/dL or less) seen in the   gamma fraction.  See immunofixation report on same specimen. Pathologic   significance requires clinical correlation.  JOYCE Monroy M.D., Ph.D.,   Pathologist ().     Protein Immunofixation Serum   Result Value Ref Range    Immunofixation ELP       (Note)  Monoclonal IgG immunoglobulin of kappa light chain type. Very  small monoclonal IgG immunoglobulin of lambda light chain type.  Monoclonal antibody therapeutics (e.g. Daratumumab) may appear as  monoclonal proteins on serum electrophoresis and immunofixation.  Results should be interpreted with caution if the patient is  receiving monoclonal antibody therapy. Pathological significance  requires clinical correlation.  JOYCE Monroy M.D., Ph.D.,  Pathologist ([phone])         695 - 1620 mg/dL     70 - 380 mg/dL    IGM 14 (L) 60 - 265 mg/dL   Protein immunofixation urine   Result Value Ref Range    Immunofix ELP Urine       (Note)  Very small monoclonal free immunoglobulin light chain of kappa  chain type. Pathological significance requires clinical correlation.  JOYCE Monroy M.D., Ph.D., Pathologist ([phone])       Protein  electrophoresis random urine   Result Value Ref Range    Albumin Fraction Urine 30.6 (H) 0 %    Alpha 1 Fraction Urine 16.1 (H) 0 %    Alpha 2 Fraction Urine 9.9 (H) 0 %    Beta Fraction Urine 24.6 (H) 0 %    Gamma Fraction Urine 18.8 (H) 0 %    Monoclonal Peak Urine 0.0 0% %    ELP Interpretation Urine       Albumin and globulins present. No obvious monoclonal seen by urine   electrophoresis, however a monoclonal protein was seen in this sample by   immunofixation which is a more sensitive method for monoclonal immunoglobulin   detection.Pathological significance requires clinical correlation.  JOYCE Monroy M.D., Ph.D., Pathologist ()       Reviewed Rheumatology lab flowsheet    I saw the patient with the fellow.  My exam and recomendations are as described.      Andres Mann MD

## 2017-05-19 NOTE — MR AVS SNAPSHOT
After Visit Summary   5/19/2017    Christian Akers    MRN: [id number]           Patient Information     Date Of Birth          2/16/1930        Visit Information        Provider Department      5/19/2017 7:30 AM Finn Navarrete MD [facility] Rheumatology        Today's Diagnoses     History of calcium pyrophosphate deposition disease (CPPD)    -  1    PMR (polymyalgia rheumatica) (H)        Hx of calcium pyrophosphate deposition disease (CPPD)        Osteopenia          Care Instructions    Decrease prednisone by 1 mg/day every month.    Get labs checked in 3 months and I will refill methotrexate if no lab abnormalities.    Follow up in 6 months, but call sooner if you are having problems.        Follow-ups after your visit        Follow-up notes from your care team     Return in about 6 months (around 11/19/2017).      Your next 10 appointments already scheduled     May 19, 2017  8:30 AM CDT   Lab with  LAB   [facility] ([facility])    [address] [place] [address]  1st Floor  [facility] [id number] [phone]            Nov 17, 2017  7:30 AM CST   (Arrive by 7:15 AM)   Return Visit with Finn Navarrete MD   [facility] Rheumatology ([facility])    [address] [place] [address]  3rd [facility] [id number] [phone]              Future tests that were ordered for you today     Open Future Orders        Priority Expected Expires Ordered    Comprehensive metabolic panel Routine 8/17/2017 5/19/2018 5/19/2017    CRP inflammation Routine 8/17/2017 5/19/2018 5/19/2017    ESR Routine 8/17/2017 5/19/2018 5/19/2017    CBC with platelets differential Routine 8/17/2017 5/19/2018 5/19/2017    CBC with platelets differential Routine  5/19/2018 5/19/2017    Comprehensive metabolic panel Routine  5/19/2018 5/19/2017    ESR Routine  5/19/2018 5/19/2017    CRP inflammation Routine  5/19/2018 5/19/2017            Who to contact     If you have questions or need follow  up information about today's clinic visit or your schedule please contact [facility] RHEUMATOLOGY directly at [phone].  Normal or non-critical lab and imaging results will be communicated to you by [email or web address], letter or phone within 4 business days after the clinic has received the results. If you do not hear from us within 7 days, please contact the clinic through [email or web address] or phone. If you have a critical or abnormal lab result, we will notify you by phone as soon as possible.  Submit refill requests through [employer] or call your pharmacy and they will forward the refill request to us. Please allow 3 business days for your refill to be completed.          Additional Information About Your Visit        MyChart Information     [employer] gives you secure access to your electronic health record. If you see a primary care provider, you can also send messages to your care team and make appointments. If you have questions, please call your primary care clinic.  If you do not have a primary care provider, please call [phone] and they will assist you.        Care EveryWhere ID     This is your Care EveryWhere ID. This could be used by other organizations to access your [place] medical records  [id number]        Your Vitals Were     Pulse Temperature Pulse Oximetry BMI (Body Mass Index)          86 97.8  F (36.6  C) (Oral) 98% 26.36 kg/m2         Blood Pressure from Last 3 Encounters:   05/19/17 150/79   02/17/17 137/79   12/30/16 (!) 168/97    Weight from Last 3 Encounters:   05/19/17 75.2 kg (165 lb 12.8 oz)   02/17/17 74.8 kg (165 lb)   12/30/16 72.5 kg (159 lb 14.4 oz)                 Today's Medication Changes          These changes are accurate as of: 5/19/17  8:28 AM.  If you have any questions, ask your nurse or doctor.               These medicines have changed or have updated prescriptions.        Dose/Directions    alendronate 70 MG tablet   Commonly known as:  FOSAMAX   This may have changed:  medication  strength   Used for:  Osteopenia, History of calcium pyrophosphate deposition disease (CPPD), PMR (polymyalgia rheumatica) (H), Hx of calcium pyrophosphate deposition disease (CPPD)   Changed by:  Finn Navarrete MD        Dose:  70 mg   Take 1 tablet (70 mg) by mouth once a week Takes every Sunday   Quantity:  4 tablet   Refills:  2            Where to get your medicines      These medications were sent to [facility]  [place] - [address]  [address] [place] [address]     Phone:  [phone]     alendronate 70 MG tablet    methotrexate 2.5 MG tablet CHEMO    predniSONE 1 MG tablet    predniSONE 5 MG tablet                Primary Care Provider Office Phone # Fax #    Luana Martinez [phone]       [facility] [address] [facility] [address]   [facility] [address]        Thank you!     Thank you for choosing [facility] RHEUMATOLOGY  for your care. Our goal is always to provide you with excellent care. Hearing back from our patients is one way we can continue to improve our services. Please take a few minutes to complete the written survey that you may receive in the mail after your visit with us. Thank you!             Your Updated Medication List - Protect others around you: Learn how to safely use, store and throw away your medicines at www.disposemymeds.org.          This list is accurate as of: 5/19/17  8:28 AM.  Always use your most recent med list.                   Brand Name Dispense Instructions for use    alendronate 70 MG tablet    FOSAMAX    4 tablet    Take 1 tablet (70 mg) by mouth once a week Takes every Sunday       AMLODIPINE BESYLATE PO      Take 5 mg by mouth daily       ASPIRIN PO      Take 81 mg by mouth daily       brimonidine 0.15 % ophthalmic solution    ALPHAGAN-P     Place 1 drop into both eyes daily       calcium-vitamin D 600-400 MG-UNIT per tablet    CALTRATE     Take 1 tablet by mouth 2 times daily       FLOMAX 0.4 MG capsule   Generic drug:   tamsulosin      Take 0.8 mg by mouth daily       FOLIC ACID PO      Take 800 mcg by mouth daily       * METHOTREXATE PO      Take 2.5 mg by mouth once a week Patient takes 8 capsules every Friday       * methotrexate 2.5 MG tablet CHEMO     32 tablet    Take 8 tablets (20 mg) by mouth once a week Take 8 tablets per week       OMEPRAZOLE PO      Take 20 mg by mouth daily       * PREDNISONE PO      Take 40 mg by mouth daily Currently taking 10 mg daily       * predniSONE 1 MG tablet    DELTASONE    360 tablet    Take 1 tablet (1 mg) by mouth daily Take with 5 mg tablets to decrease by 1 mg/day per month.       * predniSONE 5 MG tablet    DELTASONE    90 tablet    Take 1 tablet (5 mg) by mouth daily Take with 1 mg tablets to decrease by 1 mg/day every month.       sulfamethoxazole-trimethoprim 400-80 MG per tablet    BACTRIM/SEPTRA     Take 1 tablet by mouth Reported on 2/17/2017       timolol 0.5 % ophthalmic solution    TIMOPTIC     Place 1 drop into both eyes daily       TYLENOL PO      Take 500 mg by mouth every 6 hours as needed for mild pain or fever       VITAMIN D3 PO      Take 1,000 Units by mouth daily       * Notice:  This list has 5 medication(s) that are the same as other medications prescribed for you. Read the directions carefully, and ask your doctor or other care provider to review them with you.

## 2017-06-11 PROBLEM — Z51.81 ENCOUNTER FOR THERAPEUTIC DRUG MONITORING: Status: ACTIVE | Noted: 2017-06-11

## 2017-08-16 DIAGNOSIS — M35.3 PMR (POLYMYALGIA RHEUMATICA) (H): ICD-10-CM

## 2017-08-16 DIAGNOSIS — Z87.39 HISTORY OF CALCIUM PYROPHOSPHATE DEPOSITION DISEASE (CPPD): ICD-10-CM

## 2017-08-16 DIAGNOSIS — Z87.39 HX OF CALCIUM PYROPHOSPHATE DEPOSITION DISEASE (CPPD): ICD-10-CM

## 2017-08-16 DIAGNOSIS — M85.80 OSTEOPENIA: ICD-10-CM

## 2017-08-16 LAB
ALBUMIN SERPL-MCNC: 3.2 G/DL (ref 3.4–5)
ALP SERPL-CCNC: 62 U/L (ref 40–150)
ALT SERPL W P-5'-P-CCNC: 16 U/L (ref 0–70)
ANION GAP SERPL CALCULATED.3IONS-SCNC: 8 MMOL/L (ref 3–14)
AST SERPL W P-5'-P-CCNC: 14 U/L (ref 0–45)
BASOPHILS # BLD AUTO: 0.1 10E9/L (ref 0–0.2)
BASOPHILS NFR BLD AUTO: 0.9 %
BILIRUB SERPL-MCNC: 0.4 MG/DL (ref 0.2–1.3)
BUN SERPL-MCNC: 16 MG/DL (ref 7–30)
CALCIUM SERPL-MCNC: 9 MG/DL (ref 8.5–10.1)
CHLORIDE SERPL-SCNC: 103 MMOL/L (ref 94–109)
CO2 SERPL-SCNC: 28 MMOL/L (ref 20–32)
CREAT SERPL-MCNC: 1.09 MG/DL (ref 0.66–1.25)
CRP SERPL-MCNC: 24.1 MG/L (ref 0–8)
DIFFERENTIAL METHOD BLD: ABNORMAL
EOSINOPHIL # BLD AUTO: 0.3 10E9/L (ref 0–0.7)
EOSINOPHIL NFR BLD AUTO: 4.1 %
ERYTHROCYTE [DISTWIDTH] IN BLOOD BY AUTOMATED COUNT: 16.2 % (ref 10–15)
ERYTHROCYTE [SEDIMENTATION RATE] IN BLOOD BY WESTERGREN METHOD: 16 MM/H (ref 0–20)
GFR SERPL CREATININE-BSD FRML MDRD: 64 ML/MIN/1.7M2
GLUCOSE SERPL-MCNC: 96 MG/DL (ref 70–99)
HCT VFR BLD AUTO: 38.4 % (ref 40–53)
HGB BLD-MCNC: 12.9 G/DL (ref 13.3–17.7)
LYMPHOCYTES # BLD AUTO: 1.6 10E9/L (ref 0.8–5.3)
LYMPHOCYTES NFR BLD AUTO: 23.3 %
MCH RBC QN AUTO: 30.1 PG (ref 26.5–33)
MCHC RBC AUTO-ENTMCNC: 33.6 G/DL (ref 31.5–36.5)
MCV RBC AUTO: 90 FL (ref 78–100)
MONOCYTES # BLD AUTO: 1 10E9/L (ref 0–1.3)
MONOCYTES NFR BLD AUTO: 13.9 %
NEUTROPHILS # BLD AUTO: 4.1 10E9/L (ref 1.6–8.3)
NEUTROPHILS NFR BLD AUTO: 57.8 %
PLATELET # BLD AUTO: 239 10E9/L (ref 150–450)
POTASSIUM SERPL-SCNC: 4.3 MMOL/L (ref 3.4–5.3)
PROT SERPL-MCNC: 6.5 G/DL (ref 6.8–8.8)
RBC # BLD AUTO: 4.29 10E12/L (ref 4.4–5.9)
SODIUM SERPL-SCNC: 139 MMOL/L (ref 133–144)
WBC # BLD AUTO: 7 10E9/L (ref 4–11)

## 2017-08-16 PROCEDURE — 85025 COMPLETE CBC W/AUTO DIFF WBC: CPT | Mod: ZL

## 2017-08-16 PROCEDURE — 80053 COMPREHEN METABOLIC PANEL: CPT | Mod: ZL

## 2017-08-16 PROCEDURE — 86140 C-REACTIVE PROTEIN: CPT | Mod: ZL

## 2017-08-16 PROCEDURE — 85652 RBC SED RATE AUTOMATED: CPT | Mod: ZL

## 2017-08-16 PROCEDURE — 36415 COLL VENOUS BLD VENIPUNCTURE: CPT | Mod: ZL

## 2017-11-17 ENCOUNTER — OFFICE VISIT (OUTPATIENT)
Dept: RHEUMATOLOGY | Facility: CLINIC | Age: 82
End: 2017-11-17
Attending: INTERNAL MEDICINE
Payer: MEDICARE

## 2017-11-17 VITALS
HEIGHT: 68 IN | SYSTOLIC BLOOD PRESSURE: 148 MMHG | WEIGHT: 156.4 LBS | BODY MASS INDEX: 23.7 KG/M2 | DIASTOLIC BLOOD PRESSURE: 77 MMHG | TEMPERATURE: 98.4 F | HEART RATE: 94 BPM

## 2017-11-17 DIAGNOSIS — D64.9 ANEMIA, UNSPECIFIED TYPE: ICD-10-CM

## 2017-11-17 DIAGNOSIS — Z87.39 HX OF CALCIUM PYROPHOSPHATE DEPOSITION DISEASE (CPPD): ICD-10-CM

## 2017-11-17 DIAGNOSIS — Z87.39 HISTORY OF CALCIUM PYROPHOSPHATE DEPOSITION DISEASE (CPPD): Primary | ICD-10-CM

## 2017-11-17 DIAGNOSIS — Z51.81 ENCOUNTER FOR THERAPEUTIC DRUG MONITORING: ICD-10-CM

## 2017-11-17 DIAGNOSIS — M35.3 PMR (POLYMYALGIA RHEUMATICA) (H): ICD-10-CM

## 2017-11-17 DIAGNOSIS — M72.2 PLANTAR FASCIITIS: ICD-10-CM

## 2017-11-17 DIAGNOSIS — Z87.39 HISTORY OF CALCIUM PYROPHOSPHATE DEPOSITION DISEASE (CPPD): ICD-10-CM

## 2017-11-17 LAB
ALBUMIN SERPL-MCNC: 3.6 G/DL (ref 3.4–5)
ALP SERPL-CCNC: 80 U/L (ref 40–150)
ALT SERPL W P-5'-P-CCNC: 20 U/L (ref 0–70)
ANION GAP SERPL CALCULATED.3IONS-SCNC: 6 MMOL/L (ref 3–14)
AST SERPL W P-5'-P-CCNC: 16 U/L (ref 0–45)
BASOPHILS # BLD AUTO: 0 10E9/L (ref 0–0.2)
BASOPHILS NFR BLD AUTO: 0.6 %
BILIRUB SERPL-MCNC: 0.3 MG/DL (ref 0.2–1.3)
BUN SERPL-MCNC: 13 MG/DL (ref 7–30)
CALCIUM SERPL-MCNC: 9.2 MG/DL (ref 8.5–10.1)
CHLORIDE SERPL-SCNC: 104 MMOL/L (ref 94–109)
CO2 SERPL-SCNC: 29 MMOL/L (ref 20–32)
CREAT SERPL-MCNC: 1.13 MG/DL (ref 0.66–1.25)
CRP SERPL-MCNC: 8.7 MG/L (ref 0–8)
DIFFERENTIAL METHOD BLD: ABNORMAL
EOSINOPHIL # BLD AUTO: 0.1 10E9/L (ref 0–0.7)
EOSINOPHIL NFR BLD AUTO: 1.3 %
ERYTHROCYTE [DISTWIDTH] IN BLOOD BY AUTOMATED COUNT: 18.2 % (ref 10–15)
ERYTHROCYTE [SEDIMENTATION RATE] IN BLOOD BY WESTERGREN METHOD: 13 MM/H (ref 0–20)
FERRITIN SERPL-MCNC: 136 NG/ML (ref 26–388)
GFR SERPL CREATININE-BSD FRML MDRD: 61 ML/MIN/1.7M2
GLUCOSE SERPL-MCNC: 104 MG/DL (ref 70–99)
HCT VFR BLD AUTO: 40.9 % (ref 40–53)
HGB BLD-MCNC: 13.1 G/DL (ref 13.3–17.7)
IMM GRANULOCYTES # BLD: 0 10E9/L (ref 0–0.4)
IMM GRANULOCYTES NFR BLD: 0.5 %
LYMPHOCYTES # BLD AUTO: 1.1 10E9/L (ref 0.8–5.3)
LYMPHOCYTES NFR BLD AUTO: 16.6 %
MCH RBC QN AUTO: 29.2 PG (ref 26.5–33)
MCHC RBC AUTO-ENTMCNC: 32 G/DL (ref 31.5–36.5)
MCV RBC AUTO: 91 FL (ref 78–100)
MONOCYTES # BLD AUTO: 0.8 10E9/L (ref 0–1.3)
MONOCYTES NFR BLD AUTO: 12.1 %
NEUTROPHILS # BLD AUTO: 4.4 10E9/L (ref 1.6–8.3)
NEUTROPHILS NFR BLD AUTO: 68.9 %
NRBC # BLD AUTO: 0 10*3/UL
NRBC BLD AUTO-RTO: 0 /100
PLATELET # BLD AUTO: 218 10E9/L (ref 150–450)
POTASSIUM SERPL-SCNC: 4.4 MMOL/L (ref 3.4–5.3)
PROT SERPL-MCNC: 7 G/DL (ref 6.8–8.8)
RBC # BLD AUTO: 4.49 10E12/L (ref 4.4–5.9)
SODIUM SERPL-SCNC: 140 MMOL/L (ref 133–144)
WBC # BLD AUTO: 6.3 10E9/L (ref 4–11)

## 2017-11-17 PROCEDURE — 99213 OFFICE O/P EST LOW 20 MIN: CPT | Mod: ZF

## 2017-11-17 PROCEDURE — 85652 RBC SED RATE AUTOMATED: CPT | Performed by: STUDENT IN AN ORGANIZED HEALTH CARE EDUCATION/TRAINING PROGRAM

## 2017-11-17 PROCEDURE — 83520 IMMUNOASSAY QUANT NOS NONAB: CPT | Performed by: STUDENT IN AN ORGANIZED HEALTH CARE EDUCATION/TRAINING PROGRAM

## 2017-11-17 PROCEDURE — 86140 C-REACTIVE PROTEIN: CPT | Performed by: STUDENT IN AN ORGANIZED HEALTH CARE EDUCATION/TRAINING PROGRAM

## 2017-11-17 PROCEDURE — 36415 COLL VENOUS BLD VENIPUNCTURE: CPT | Performed by: STUDENT IN AN ORGANIZED HEALTH CARE EDUCATION/TRAINING PROGRAM

## 2017-11-17 PROCEDURE — 85025 COMPLETE CBC W/AUTO DIFF WBC: CPT | Performed by: STUDENT IN AN ORGANIZED HEALTH CARE EDUCATION/TRAINING PROGRAM

## 2017-11-17 PROCEDURE — 80053 COMPREHEN METABOLIC PANEL: CPT | Performed by: STUDENT IN AN ORGANIZED HEALTH CARE EDUCATION/TRAINING PROGRAM

## 2017-11-17 PROCEDURE — 82728 ASSAY OF FERRITIN: CPT | Performed by: STUDENT IN AN ORGANIZED HEALTH CARE EDUCATION/TRAINING PROGRAM

## 2017-11-17 RX ORDER — PREDNISONE 1 MG/1
2 TABLET ORAL DAILY
Qty: 360 TABLET | Refills: 2 | Status: SHIPPED | OUTPATIENT
Start: 2017-11-17 | End: 2018-04-27

## 2017-11-17 RX ORDER — ALENDRONATE SODIUM 70 MG/1
70 TABLET ORAL WEEKLY
Qty: 4 TABLET | Refills: 2 | Status: SHIPPED | OUTPATIENT
Start: 2017-11-17 | End: 2018-04-12

## 2017-11-17 ASSESSMENT — PAIN SCALES - GENERAL: PAINLEVEL: NO PAIN (0)

## 2017-11-17 NOTE — LETTER
11/17/2017       RE: Christian Akers  [address]  [facility] [address]     Dear Colleague,    Thank you for referring your patient, Christian Akers, to the [facility]UMATOLOGY [facility] acute [facility]. Please see a copy of my visit note below.    Rheumatology Clinic Visit     Christian Akers MRN# [id number]   YOB: 1930 Age: 87 year old     Date of Visit: 11/17/2017    Primary care provider: Luana Martinez MD, [facility]  Referring provider: Luana Martinez MD, [facility]         Assessment and Plan:   #Polyarticular inflammatory arthritis-right knee aspiration 7/2016 with 13,000 WBC's, no crystals  #Chondrocalcinosis of wrists, history of acute pseudogout left knee in 2015, left knee aspiration with 2100 WBC's, intracellular CPP, history of crystalline-sounding left ankle monoarthritis with corticosteroid injection January of 2017  #History of neck/shoulder pain, myalgia, weakness, elevated inflammatory markers, bilateral temporal artery biopsy negative November of 2016 but after 4-5 months of high dose prednisone  #Osteopenia  #Long-term use of high doses of corticosteroids  #Monoclonal proteinemia  #History of unprovoked DVT/PE in 2015, treated with 12 months anticoagulation    Mr. Akers is doing well on his current prednisone taper. I am suspicious his underlying disease is CPPD however PMR is a possibility. Regardless, he is doing well.    -continue to decrease prednisone by 1 mg/day every month until off  -continue methotrexate 8 tablets weekly, once off prednisone and doing well for 1 month, will decrease methotrexate by 1 tablet/week every month until follow up  -if arthritis flares, to resume prednisone 10 mg daily and call the clinic for further instructions  -calcium, vitamin D, and a bisphosphonate given his existing osteopenia  -repeat labs today and in 3 months, would also like them sent to   [facility]   -if he is having flares of mono or oligoarticular arthritis I would be very suspicious of CPPD and in the future we could consider addition of Plaquenil or colchicine  -follow up in 6 months with labs closer to home in 3 months  -orthotics referral for shoe inserts, insoles, also advised a trip to [employer]    Seen and discussed with Dr. Mann.    Finn Navarrete MD  Rheumatology Fellow  [phone]    CC: Luana Martinez MD, [facility]          History of Present Illness:   From initial HPI: Christian Akers is a 87 year old male who presented for a second opinion of polymyalgia rheumatica vs giant cell arteritis and polyarticular inflammatory arthritis. Mr. Akers had his left knee replaced 4/25/16 and did well with rehab until Memorial Day when he developed bilateral shoulder and neck pain that got progressively worse. He also had stiffness in his hands, wrists, elbows, feet, ankles, and knees. He also had difficulty getting out of chairs, bed, and moving around. He was seen by several providers and the ED. He had been given baclofen, which did not help, and then NSAID's which also did not help. In June of 2016 he developed weakness and poor appetite and was noted to be febrile and tachycardic. He was evaluated in [place] and found to have a hemoglobin of 9.8 (around 2 grams lower than last prior) and a white count of 14.1. He was hospitalized and had an extensive work up including CT abdomen/pelvis, CTA, CT head, endoscopy, and colonoscopy. His endoscopy showed what was likely reactive gastropathy from the non-steroidals. CTA showed bibasilar infiltrates with small effusions and he was ultimately treated for pneumonia. He was discharged on a PPi and cessation of NSAID's. Because his arthritic symptoms did not saundra he was seen by Dr. Luana Martinez at [facility] 6/21/16 who felt his symptoms were most likely  related to crystalline arthritis and he was given prednisone 10 mg per day and his right knee was aspirated. I do not have records of the reasoning behind this decision, but between June and mid-July his prednisone was increased to 60 mg daily and then tapered down to 40 mg daily with complete resolution of his symptoms. It was noted that he had persistently low albumin (low 3'willian) but elevated inflammatory markers despite these high doses. He was placed on methotrexate with a working diagnosis of seronegative rheumatoid arthritis in mid-July and he was tapered down to 10-12.5 mg of prednisone daily which apparently resolved his symptoms. He was appropriately on calcium and vitamin D, Fosamax, and Bactrim prophylaxis during this time when his prednisone dose was high. In October his CRP was 43.7 on 12.5 mg of prednisone daily but he felt well and was tapered down to 10 mg daily. However in mid-November he began to experience fatigue but denied other GCA symptoms. His prednisone was increased back to 50 mg daily and he had a bilateral temporal artery biopsy which was reportedly negative. Another CT chest/abdomen/pelvis was negative for malignancy. He was noted to have a monoclonal proteinemia that was thought insignificant by heme/onc. CRP at his November visit when on 10 mg of prednisone daily was 96.7. His daughter also notes he had a urinary tract infection at some point in this interim, but I note several negative UA's in his paper chart throughout the entire fall. He was then referred to [facility] for a second opinion.    On his initial visit he was taking prednisone 40 mg daily and felt essentially normal. His prednisone was decreased to 20 mg daily and further tapered down to 10 mg in 2.5 mg/day increments every other week. He followed up with Dr. Martinez and Ms. Garcia of [facility] rheumatology who noted that his CRP was persistently elevated but he was feeling well. Given the discrepancy he was seen an an  expedited basis here in the [facility].    Last seen: 17  Interim history:  Mr. Akers continues to do well. He is down to 2 mg of prednisone daily and does not notice any untoward effects. He is exercising daily and bowling once per week. Visited [facility] this [place]. No infections or other side effects noted from the medications. He and his daughter inquire about new shoe inserts.         Review of Systems (other than HPI):   Constitutional: negative  Skin: negative  Eyes: negative  Ears/Nose/Throat: negative  Respiratory: negative  Cardiovascular: negative  Gastrointestinal: negative  Genitourinary: negative  Musculoskeletal: negative  Neurologic: negative  Psychiatric: negative  Hematologic/Lymphatic/Immunologic: negative  Endocrine: negative          Past Medical History:   HTN  HLD  BPH with negative prostate biopsies in   RBBB  Unprovoked PE 2015, treated with anticoagulation for 12 months  Seronegative inflammatory arthritis  OA  UTI in  with hemorrhagic cystitis in   Borderline glaucoma  Acute pseudogout of left knee 2015   Osteopenia    Left inguinal hernia repair  Cataract repair  Left TKA 2016  Bilateral temporal artery biopsies 2016-negative         Social History:   Former smoker, quit 40+ years ago  1 drink per week   with 5 children  Retired   Still very active with traveling, regular exercise, golf  Lives in [place] with his long-time female friend         Family History:   Father  of cancer at 83  Mother  at 83 with cancer  Brother  at 76 of prostate cancer  Sister  at age 54 of unknown cause, diagnosed with cancer  Daughter alive and well  Son alive and well  5 brothers, 2 sisters alive and healthy         Allergies:   Cialis-stomach upset         Medications:     Current Outpatient Prescriptions   Medication Sig Dispense Refill     methotrexate 2.5 MG tablet CHEMO Take 8 tablets (20 mg) by mouth once  "a week Take 8 tablets per week 32 tablet 2     predniSONE (DELTASONE) 1 MG tablet Take 1 tablet (1 mg) by mouth daily Take with 5 mg tablets to decrease by 1 mg/day per month. (Patient taking differently: Take 2 mg by mouth daily Take with 5 mg tablets to decrease by 1 mg/day per month.) 360 tablet 2     alendronate (FOSAMAX) 70 MG tablet Take 1 tablet (70 mg) by mouth once a week Takes every Sunday 4 tablet 2     AMLODIPINE BESYLATE PO Take 5 mg by mouth daily       calcium-vitamin D (CALTRATE) 600-400 MG-UNIT per tablet Take 1 tablet by mouth 2 times daily       Cholecalciferol (VITAMIN D3 PO) Take 1,000 Units by mouth daily       tamsulosin (FLOMAX) 0.4 MG capsule Take 0.8 mg by mouth daily       OMEPRAZOLE PO Take 20 mg by mouth daily       FOLIC ACID PO Take 800 mcg by mouth daily       brimonidine (ALPHAGAN-P) 0.15 % ophthalmic solution Place 1 drop into both eyes daily       ASPIRIN PO Take 81 mg by mouth daily       Acetaminophen (TYLENOL PO) Take 500 mg by mouth every 6 hours as needed for mild pain or fever       [DISCONTINUED] Methotrexate Sodium (METHOTREXATE PO) Take 2.5 mg by mouth once a week Patient takes 8 capsules every Friday            Physical Exam:   Blood pressure 148/77, pulse 94, temperature 98.4  F (36.9  C), temperature source Oral, height 1.715 m (5' 7.5\"), weight 70.9 kg (156 lb 6.4 oz).  Wt Readings from Last 4 Encounters:   11/17/17 70.9 kg (156 lb 6.4 oz)   05/19/17 75.2 kg (165 lb 12.8 oz)   02/17/17 74.8 kg (165 lb)   12/30/16 72.5 kg (159 lb 14.4 oz)     Constitutional: well-developed, appearing stated age; cooperative  Eyes: normal EOM, PERRLA, vision, conjunctiva, sclera  ENT: normal external ears, nose, hearing, lips, teeth, gums, throat, no mucous membrane lesions, normal saliva pool  Neck: no mass or thyroid enlargement  Resp: lungs clear to auscultation  CV: RRR, no murmurs, rubs or gallops, no edema  GI: no abdominal mass or tenderness, no organomegaly   : not " "tested  Lymph: no cervical, supraclavicular, or epitrochlear nodes  MS: The TMJ, neck, shoulder, elbow, wrist, MCP/PIP/DIP, spine, hip, knee, ankle, and foot MTP/IP joints were examined and found normal. No active synovitis or altered joint anatomy. Full joint ROM. Normal  strength. No dactylitis,  tenosynovitis, enthesopathy. Left knee arthrotomy scar. Prominence to right 5th metatarsal head.  Skin: no nail pitting, alopecia, rash, nodules or lesions  Neuro: normal cranial nerves, strength, sensation, DTR's  Psych: normal judgement, orientation, memory, affect         Data:   Outside Data:  Hep B/C serology negative    Lyme, Anaplasma negative  RF negative  CAMPBELL negative  Babesia negative  Ehrlichia negative  Parvovirus PCR negative  EBV IgG positive, IgM negative  Monoscreen negative  Immunoglobulins with normal IgA/G, low IgM (22, 50 lower limit of normal)  PTH 14.4 (normal)  Ferritin 1137.3 6/19/16    Left knee aspiration 11/29/15:  2106 cells, 95% PMN's, intracellular CPPD    Right knee aspiration June 2016: 13,830 cells, 92% PMN's, no crystals seen   Cr 1.1-1.2  HGB 10-11  WBC's 14-15 with PMN predominance, lymphopenia    Outside Radiology:  Echocardiogram 6/16/16 for \"fever unexplained\"  The left ventricle is normal size.  The left ventricular systolic function is hyperdynamic.  There is mild concentric hypertrophy.  Left atrium is mildly enlarged by volume.  The aortic valve is mildly sclerotic.  Moderate mitral valve annular calcification.  Borderline mitral valve stenosis.  Estimated RV systolic pressure is 43 mm Hg above right atrial pressure.  Small pericardial effusion.  There is no echocardiographic evidence of endocarditis.    CT abdomen/pelvis   1. No acute obstructive or inflammatory abdominopelvic process.  2. Moderate to severe colonic diverticulosis without diverticulitis. Mild constipation.  3. Mild cardiomegaly, aortic valve and mitral annular calcifications, better assessed by nonemergent " "cardiac echo. Also coronary arterial calcifications.  4. Moderate generalized osteopenia. Outpatient DEXA scan recommended.  5. Mild diffuse bladder mural thickening versus underdistention could be due to chronic bladder outlet obstruction due to prostatomegaly. Infectious cystitis felt to be less likely.     CTA:  1. Mild-to-moderate bilateral basilar infiltrates with small effusions.  2. In the major pulmonary arteries and first divisions there is no evidence for pulmonary emboli. The distal branches are difficult to see.    Colonoscopy:  Rectal exam was preformed and was Normal. The olympus videoendoscope was inserted into the anus and passed without difficulty to the cecum. TI normal. Cecum was identified by coelesence of the tinea, visualization of the appendiceal orifice and visualization of the ileocecal valve. The scope was slowly withdrawn and all walls of the colon were visualized. No polyps or masses or inflammation was seen. Sigmoid Diverticulosis was identified. Upon reaching the rectum the scope was retroverted and minimal internal hemorrhoids were identified. No abnormalities were noted.    Endoscopy:  The olympus scope was inserted via the oropharyx passed without difficulty via the esophagus and into the stomach. The scope was then advanced into the duodenum. Duodenum was normal. Duodenum biopsies were not obtained. The scope was slowly withdrawn the duodenum and stomach was examined. Forward and retroflexion views were performed. Stomach was had preplyoic gastritis. Gastric biopsies were obtained. A sliding hiatal hernia was identified. The GE junction was at 36 cm. GE junction had bowen's like changes. Gastro-esophageal biopsies were obtained. The scope was removed from the patient and esophagus examined. The esophagus was normal.    Endoscopic biopsies:  A) Specimen designated \"prepyloric\", biopsy:  Reactive gastropathy with focal erosion (see comment)  No Helicobacter pylori organisms " identified on immunohistochemical  stain  B) GE junction, biopsy:  Mildly inflamed gastric cardiac type mucosa with scant squamous  epithelium  No goblet cell metaplasia identified      Shoulder x-ray: moderate severe left GH arthritis, moderate right AC OA with spurring    Bilateral hand x-ray: bilateral OA of DIP's and MCP's    Bilateral wrist x-rays in PACS: chondrocalcinosis of bilateral triangular cartilages    Results for orders placed or performed in visit on 08/16/17   CRP inflammation   Result Value Ref Range    CRP Inflammation 24.1 (H) 0.0 - 8.0 mg/L   ESR   Result Value Ref Range    Sed Rate 16 0 - 20 mm/h   CBC with platelets differential   Result Value Ref Range    WBC 7.0 4.0 - 11.0 10e9/L    RBC Count 4.29 (L) 4.4 - 5.9 10e12/L    Hemoglobin 12.9 (L) 13.3 - 17.7 g/dL    Hematocrit 38.4 (L) 40.0 - 53.0 %    MCV 90 78 - 100 fl    MCH 30.1 26.5 - 33.0 pg    MCHC 33.6 31.5 - 36.5 g/dL    RDW 16.2 (H) 10.0 - 15.0 %    Platelet Count 239 150 - 450 10e9/L    Diff Method Automated Method     % Neutrophils 57.8 %    % Lymphocytes 23.3 %    % Monocytes 13.9 %    % Eosinophils 4.1 %    % Basophils 0.9 %    Absolute Neutrophil 4.1 1.6 - 8.3 10e9/L    Absolute Lymphocytes 1.6 0.8 - 5.3 10e9/L    Absolute Monocytes 1.0 0.0 - 1.3 10e9/L    Absolute Eosinophils 0.3 0.0 - 0.7 10e9/L    Absolute Basophils 0.1 0.0 - 0.2 10e9/L   Comprehensive metabolic panel   Result Value Ref Range    Sodium 139 133 - 144 mmol/L    Potassium 4.3 3.4 - 5.3 mmol/L    Chloride 103 94 - 109 mmol/L    Carbon Dioxide 28 20 - 32 mmol/L    Anion Gap 8 3 - 14 mmol/L    Glucose 96 70 - 99 mg/dL    Urea Nitrogen 16 7 - 30 mg/dL    Creatinine 1.09 0.66 - 1.25 mg/dL    GFR Estimate 64 >60 mL/min/1.7m2    GFR Estimate If Black 77 >60 mL/min/1.7m2    Calcium 9.0 8.5 - 10.1 mg/dL    Bilirubin Total 0.4 0.2 - 1.3 mg/dL    Albumin 3.2 (L) 3.4 - 5.0 g/dL    Protein Total 6.5 (L) 6.8 - 8.8 g/dL    Alkaline Phosphatase 62 40 - 150 U/L    ALT 16 0 - 70 U/L     AST 14 0 - 45 U/L     Reviewed Rheumatology lab flowsheet    I saw the patient with the fellow.  My exam and recomendations are as described.      Andres Mann MD

## 2017-11-17 NOTE — PATIENT INSTRUCTIONS
Labs today. Will send you results. Labs again in 3 months.     Please send labs to Dr. Martinez.    Continue to decrease prednisone by 1 mg/day every month.     After you have been off prednisone for 1 month, start decreasing methotrexate by 1 tablet/week every month.   -example: 7 tablets/week for 1 month, 6 tablets/week for 1 month, 5 tablets/week for 1 month, 4 tablets/week for 1 month    Orthotics referral    If your arthritis flares between visits, please go back on prednisone 10 mg daily and call the clinic ASAP.

## 2017-11-17 NOTE — NURSING NOTE
"Chief Complaint   Patient presents with     RECHECK     follow up with arthritis, tzimmer cma       Initial /77  Pulse 94  Temp 98.4  F (36.9  C) (Oral)  Ht 1.715 m (5' 7.5\")  Wt 70.9 kg (156 lb 6.4 oz)  BMI 24.13 kg/m2 Estimated body mass index is 24.13 kg/(m^2) as calculated from the following:    Height as of this encounter: 1.715 m (5' 7.5\").    Weight as of this encounter: 70.9 kg (156 lb 6.4 oz).  Medication Reconciliation: complete    "

## 2017-11-17 NOTE — MR AVS SNAPSHOT
After Visit Summary   11/17/2017    Christian Akers    MRN: [id number]           Patient Information     Date Of Birth          2/16/1930        Visit Information        Provider Department      11/17/2017 7:30 AM Finn Navarrete MD [facility]        Today's Diagnoses     History of calcium pyrophosphate deposition disease (CPPD)    -  1    Encounter for therapeutic drug monitoring        PMR (polymyalgia rheumatica) (H)        Hx of calcium pyrophosphate deposition disease (CPPD)        Plantar fasciitis        Anemia, unspecified type          Care Instructions    Labs today. Will send you results. Labs again in 3 months.     Please send labs to Dr. Martinez.    Continue to decrease prednisone by 1 mg/day every month.     After you have been off prednisone for 1 month, start decreasing methotrexate by 1 tablet/week every month.   -example: 7 tablets/week for 1 month, 6 tablets/week for 1 month, 5 tablets/week for 1 month, 4 tablets/week for 1 month    Orthotics referral    If your arthritis flares between visits, please go back on prednisone 10 mg daily and call the clinic ASAP.          Follow-ups after your visit        Additional Services     ORTHOTICS REFERRAL       **This referral order prints off in the [facility]  (Orthotics & Prosthetics) Central Scheduling Office**    The [facility] Central Scheduling Staff will contact the patient to schedule appointments.     Central Scheduling Contact Information: [phone] ([place])    Orthotics: Foot Orthotics    Please be aware that coverage of these services is subject to the terms and limitations of your health insurance plan.  Call member services at your health plan with any benefit or coverage questions.      Please bring the following to your appointment:    >>   Any x-rays, CTs or MRIs which have been performed.  Contact the facility where they were done to arrange for  prior to your scheduled  appointment.    >>   List of current medications   >>   This referral request   >>   Any documents/labs given to you for this referral                  Follow-up notes from your care team     Return in about 6 months (around 5/17/2018).      Your next 10 appointments already scheduled     Nov 17, 2017  8:45 AM CST   Lab with [facility]   [facility] ([facility])    [address]  1st Floor  [facility] [address] [phone]            May 18, 2018  8:30 AM CDT   (Arrive by 8:15 AM)   Return Visit with Finn Navarrete MD   [facility] ([facility])    [address]  3rd Floor  [facility] [id number] [phone]              Future tests that were ordered for you today     Open Standing Orders        Priority Remaining Interval Expires Ordered    Alkaline phosphatase Routine 4/4 [id number] 11/17/2018 11/17/2017    ALT Routine 4/4 y1zwgdq 11/17/2018 11/17/2017    AST Routine 4/4 w7hhoty 11/17/2018 11/17/2017    Basic metabolic panel Routine 4/4 m2ijjme 11/17/2018 11/17/2017    Bilirubin  total Routine 4/4 z1durul 11/17/2018 11/17/2017    CBC with platelets differential Routine 4/4 m6ojqsq 11/17/2018 11/17/2017    Creatinine Routine 4/4 v7mxtpu 11/17/2018 11/17/2017    Albumin level Routine 4/4 s0hmggi 11/17/2018 11/17/2017    Erythrocyte sedimentation rate auto Routine 4/4 l0wqrym 11/17/2018 11/17/2017    CRP inflammation Routine 4/4 j2qwogy 11/17/2018 11/17/2017          Open Future Orders        Priority Expected Expires Ordered    Ferritin Routine  11/17/2018 11/17/2017    Interleukin 6 Blood Routine  11/17/2018 11/17/2017    ALT Routine  11/17/2018 11/17/2017    AST Routine  11/17/2018 11/17/2017    Basic metabolic panel Routine  11/17/2018 11/17/2017    Bilirubin  total Routine  11/17/2018 11/17/2017    CBC with platelets differential Routine  11/17/2018 11/17/2017    Erythrocyte sedimentation rate auto Routine  11/17/2018 11/17/2017     "Albumin level Routine  11/17/2018 11/17/2017    Alkaline phosphatase Routine  11/17/2018 11/17/2017    CRP inflammation Routine  11/17/2018 11/17/2017            Who to contact     If you have questions or need follow up information about today's clinic visit or your schedule please contact [facility] directly at [phone].  Normal or non-critical lab and imaging results will be communicated to you by [employer], letter or phone within 4 business days after the clinic has received the results. If you do not hear from us within 7 days, please contact the clinic through [employer] or phone. If you have a critical or abnormal lab result, we will notify you by phone as soon as possible.  Submit refill requests through [employer] or call your pharmacy and they will forward the refill request to us. Please allow 3 business days for your refill to be completed.          Additional Information About Your Visit        [employer] Information     [employer] gives you secure access to your electronic health record. If you see a primary care provider, you can also send messages to your care team and make appointments. If you have questions, please call your primary care clinic.  If you do not have a primary care provider, please call [phone] and they will assist you.        Care EveryWhere ID     This is your Care EveryWhere ID. This could be used by other organizations to access your [place] medical records  [id number]        Your Vitals Were     Pulse Temperature Height BMI (Body Mass Index)          94 98.4  F (36.9  C) (Oral) 1.715 m (5' 7.5\") 24.13 kg/m2         Blood Pressure from Last 3 Encounters:   11/17/17 148/77   05/19/17 150/79   02/17/17 137/79    Weight from Last 3 Encounters:   11/17/17 70.9 kg (156 lb 6.4 oz)   05/19/17 75.2 kg (165 lb 12.8 oz)   02/17/17 74.8 kg (165 lb)              We Performed the Following     ORTHOTICS REFERRAL          Today's Medication Changes          These changes are accurate " as of: 11/17/17  8:43 AM.  If you have any questions, ask your nurse or doctor.               These medicines have changed or have updated prescriptions.        Dose/Directions    methotrexate 2.5 MG tablet CHEMO   This may have changed:    - how much to take  - additional instructions   Used for:  PMR (polymyalgia rheumatica) (H), Hx of calcium pyrophosphate deposition disease (CPPD), History of calcium pyrophosphate deposition disease (CPPD), Encounter for therapeutic drug monitoring, Plantar fasciitis, Anemia, unspecified type   Changed by:  Finn Navarrete MD        Dose:  20 mg   Take 8 tablets (20 mg) by mouth once a week After off prednisone, decrease by 1 tab/week every month until follow up.   Quantity:  32 tablet   Refills:  2       predniSONE 1 MG tablet   Commonly known as:  DELTASONE   This may have changed:    - how much to take  - additional instructions  - Another medication with the same name was removed. Continue taking this medication, and follow the directions you see here.   Used for:  PMR (polymyalgia rheumatica) (H), Hx of calcium pyrophosphate deposition disease (CPPD), History of calcium pyrophosphate deposition disease (CPPD), Encounter for therapeutic drug monitoring, Plantar fasciitis, Anemia, unspecified type   Changed by:  Finn Navarrete MD        Dose:  2 mg   Take 2 tablets (2 mg) by mouth daily Decrease by 1 mg/day every month until off.   Quantity:  360 tablet   Refills:  2         Stop taking these medicines if you haven't already. Please contact your care team if you have questions.     sulfamethoxazole-trimethoprim 400-80 MG per tablet   Commonly known as:  BACTRIM/SEPTRA   Stopped by:  Finn Navarrete MD           timolol 0.5 % ophthalmic solution   Commonly known as:  TIMOPTIC   Stopped by:  Finn Navarrete MD                Where to get your medicines      These medications were sent to [facility] - [place], [place] - [address]  [address] [address]     Phone:   [phone]     alendronate 70 MG tablet    methotrexate 2.5 MG tablet CHEMO    predniSONE 1 MG tablet                Primary Care Provider Office Phone # Fax #    Luana Martinez [phone]       [facility] [address] [address]   [facility] [address]        Equal Access to Services     GRAEME BEE : Hadii aad ku hadasho Soomaali, waaxda luqadaha, qaybta kaalmada adeegyada, waxay anselmoin hayaan ademaureen swethahector lavinicio . So [facility] [phone].    ATENCIÓN: Si habla español, tiene a sultana disposición servicios gratuitos de asistencia lingüística. Anaame al [phone].    We comply with applicable federal civil rights laws and [place] laws. We do not discriminate on the basis of race, color, national origin, age, disability, sex, sexual orientation, or gender identity.            Thank you!     Thank you for choosing [facility]  for your care. Our goal is always to provide you with excellent care. Hearing back from our patients is one way we can continue to improve our services. Please take a few minutes to complete the written survey that you may receive in the mail after your visit with us. Thank you!             Your Updated Medication List - Protect others around you: Learn how to safely use, store and throw away your medicines at www.disposemymeds.org.          This list is accurate as of: 11/17/17  8:43 AM.  Always use your most recent med list.                   Brand Name Dispense Instructions for use Diagnosis    alendronate 70 MG tablet    FOSAMAX    4 tablet    Take 1 tablet (70 mg) by mouth once a week Takes every Sunday    History of calcium pyrophosphate deposition disease (CPPD), PMR (polymyalgia rheumatica) (H), Hx of calcium pyrophosphate deposition disease (CPPD), Encounter for therapeutic drug monitoring, Plantar fasciitis, Anemia, unspecified type       AMLODIPINE BESYLATE PO      Take 5 mg by mouth daily        ASPIRIN PO      Take 81 mg by mouth daily         brimonidine 0.15 % ophthalmic solution    ALPHAGAN-P     Place 1 drop into both eyes daily        calcium-vitamin D 600-400 MG-UNIT per tablet    CALTRATE     Take 1 tablet by mouth 2 times daily        FLOMAX 0.4 MG capsule   Generic drug:  tamsulosin      Take 0.8 mg by mouth daily        FOLIC ACID PO      Take 800 mcg by mouth daily        methotrexate 2.5 MG tablet CHEMO     32 tablet    Take 8 tablets (20 mg) by mouth once a week After off prednisone, decrease by 1 tab/week every month until follow up.    PMR (polymyalgia rheumatica) (H), Hx of calcium pyrophosphate deposition disease (CPPD), History of calcium pyrophosphate deposition disease (CPPD), Encounter for therapeutic drug monitoring, Plantar fasciitis, Anemia, unspecified type       OMEPRAZOLE PO      Take 20 mg by mouth daily        predniSONE 1 MG tablet    DELTASONE    360 tablet    Take 2 tablets (2 mg) by mouth daily Decrease by 1 mg/day every month until off.    PMR (polymyalgia rheumatica) (H), Hx of calcium pyrophosphate deposition disease (CPPD), History of calcium pyrophosphate deposition disease (CPPD), Encounter for therapeutic drug monitoring, Plantar fasciitis, Anemia, unspecified type       TYLENOL PO      Take 500 mg by mouth every 6 hours as needed for mild pain or fever        VITAMIN D3 PO      Take 1,000 Units by mouth daily

## 2017-11-17 NOTE — PROGRESS NOTES
Rheumatology Clinic Visit     Christian Akers MRN# [id number]   YOB: 1930 Age: 87 year old     Date of Visit: 11/17/2017    Primary care provider: Luana Martinez MD, [facility]  Referring provider: Luana Martinez MD, [facility]         Assessment and Plan:   #Polyarticular inflammatory arthritis-right knee aspiration 7/2016 with 13,000 WBC's, no crystals  #Chondrocalcinosis of wrists, history of acute pseudogout left knee in 2015, left knee aspiration with 2100 WBC's, intracellular CPP, history of crystalline-sounding left ankle monoarthritis with corticosteroid injection January of 2017  #History of neck/shoulder pain, myalgia, weakness, elevated inflammatory markers, bilateral temporal artery biopsy negative November of 2016 but after 4-5 months of high dose prednisone  #Osteopenia  #Long-term use of high doses of corticosteroids  #Monoclonal proteinemia  #History of unprovoked DVT/PE in 2015, treated with 12 months anticoagulation    Mr. Akers is doing well on his current prednisone taper. I am suspicious his underlying disease is CPPD however PMR is a possibility. Regardless, he is doing well.    -continue to decrease prednisone by 1 mg/day every month until off  -continue methotrexate 8 tablets weekly, once off prednisone and doing well for 1 month, will decrease methotrexate by 1 tablet/week every month until follow up  -if arthritis flares, to resume prednisone 10 mg daily and call the clinic for further instructions  -calcium, vitamin D, and a bisphosphonate given his existing osteopenia  -repeat labs today and in 3 months, would also like them sent to Dr. Martinez [facility]   -if he is having flares of mono or oligoarticular arthritis I would be very suspicious of CPPD and in the future we could consider addition of Plaquenil or colchicine  -follow up in 6 months with labs closer to home in 3 months  -orthotics  referral for shoe inserts, insoles, also advised a trip to [employer]    Seen and discussed with Dr. Mann.    Finn Navarrete MD  Rheumatology Fellow  [phone]    CC: Luana Martinez MD, [facility]          History of Present Illness:   From initial HPI: Christian Akers is a 87 year old male who presented for a second opinion of polymyalgia rheumatica vs giant cell arteritis and polyarticular inflammatory arthritis. Mr. Akers had his left knee replaced 4/25/16 and did well with rehab until Memorial Day when he developed bilateral shoulder and neck pain that got progressively worse. He also had stiffness in his hands, wrists, elbows, feet, ankles, and knees. He also had difficulty getting out of chairs, bed, and moving around. He was seen by several providers and the ED. He had been given baclofen, which did not help, and then NSAID's which also did not help. In June of 2016 he developed weakness and poor appetite and was noted to be febrile and tachycardic. He was evaluated in [place] and found to have a hemoglobin of 9.8 (around 2 grams lower than last prior) and a white count of 14.1. He was hospitalized and had an extensive work up including CT abdomen/pelvis, CTA, CT head, endoscopy, and colonoscopy. His endoscopy showed what was likely reactive gastropathy from the non-steroidals. CTA showed bibasilar infiltrates with small effusions and he was ultimately treated for pneumonia. He was discharged on a PPi and cessation of NSAID's. Because his arthritic symptoms did not saundra he was seen by Dr. Luana Martinez at [facility] 6/21/16 who felt his symptoms were most likely related to crystalline arthritis and he was given prednisone 10 mg per day and his right knee was aspirated. I do not have records of the reasoning behind this decision, but between June and mid-July his prednisone was increased to 60 mg daily and then tapered down to 40 mg daily with  complete resolution of his symptoms. It was noted that he had persistently low albumin (low 3'willian) but elevated inflammatory markers despite these high doses. He was placed on methotrexate with a working diagnosis of seronegative rheumatoid arthritis in mid-July and he was tapered down to 10-12.5 mg of prednisone daily which apparently resolved his symptoms. He was appropriately on calcium and vitamin D, Fosamax, and Bactrim prophylaxis during this time when his prednisone dose was high. In October his CRP was 43.7 on 12.5 mg of prednisone daily but he felt well and was tapered down to 10 mg daily. However in mid-November he began to experience fatigue but denied other GCA symptoms. His prednisone was increased back to 50 mg daily and he had a bilateral temporal artery biopsy which was reportedly negative. Another CT chest/abdomen/pelvis was negative for malignancy. He was noted to have a monoclonal proteinemia that was thought insignificant by heme/onc. CRP at his November visit when on 10 mg of prednisone daily was 96.7. His daughter also notes he had a urinary tract infection at some point in this interim, but I note several negative UA's in his paper chart throughout the entire fall. He was then referred to [facility] [place] for a second opinion.    On his initial visit he was taking prednisone 40 mg daily and felt essentially normal. His prednisone was decreased to 20 mg daily and further tapered down to 10 mg in 2.5 mg/day increments every other week. He followed up with Dr. Martinez and Ms. Garcia of [facility] who noted that his CRP was persistently elevated but he was feeling well. Given the discrepancy he was seen an an expedited basis here in the [facility].    Last seen: 5/19/17  Interim history:  Mr. Akers continues to do well. He is down to 2 mg of prednisone daily and does not notice any untoward effects. He is exercising daily and bowling once per week. Visited [facility] this summer in  the [facility]. No infections or other side effects noted from the medications. He and his daughter inquire about new shoe inserts.         Review of Systems (other than HPI):   Constitutional: negative  Skin: negative  Eyes: negative  Ears/Nose/Throat: negative  Respiratory: negative  Cardiovascular: negative  Gastrointestinal: negative  Genitourinary: negative  Musculoskeletal: negative  Neurologic: negative  Psychiatric: negative  Hematologic/Lymphatic/Immunologic: negative  Endocrine: negative          Past Medical History:   HTN  HLD  BPH with negative prostate biopsies in   RBBB  Unprovoked PE 2015, treated with anticoagulation for 12 months  Seronegative inflammatory arthritis  OA  UTI in  with hemorrhagic cystitis in   Borderline glaucoma  Acute pseudogout of left knee 2015   Osteopenia    Left inguinal hernia repair  Cataract repair  Left TKA 2016  Bilateral temporal artery biopsies 2016-negative         Social History:   Former smoker, quit 40+ years ago  1 drink per week   with 5 children  Retired   Still very active with traveling, regular exercise, golf  Lives in [place] with his long-time female friend         Family History:   Father  of cancer at 83  Mother  at 83 with cancer  Brother  at 76 of prostate cancer  Sister  at age 54 of unknown cause, diagnosed with cancer  Daughter alive and well  Son alive and well  5 brothers, 2 sisters alive and healthy         Allergies:   Cialis-stomach upset         Medications:     Current Outpatient Prescriptions   Medication Sig Dispense Refill     methotrexate 2.5 MG tablet CHEMO Take 8 tablets (20 mg) by mouth once a week Take 8 tablets per week 32 tablet 2     predniSONE (DELTASONE) 1 MG tablet Take 1 tablet (1 mg) by mouth daily Take with 5 mg tablets to decrease by 1 mg/day per month. (Patient taking differently: Take 2 mg by mouth daily Take with 5 mg tablets to decrease by 1 mg/day per month.)  "360 tablet 2     alendronate (FOSAMAX) 70 MG tablet Take 1 tablet (70 mg) by mouth once a week Takes every Sunday 4 tablet 2     AMLODIPINE BESYLATE PO Take 5 mg by mouth daily       calcium-vitamin D (CALTRATE) 600-400 MG-UNIT per tablet Take 1 tablet by mouth 2 times daily       Cholecalciferol (VITAMIN D3 PO) Take 1,000 Units by mouth daily       tamsulosin (FLOMAX) 0.4 MG capsule Take 0.8 mg by mouth daily       OMEPRAZOLE PO Take 20 mg by mouth daily       FOLIC ACID PO Take 800 mcg by mouth daily       brimonidine (ALPHAGAN-P) 0.15 % ophthalmic solution Place 1 drop into both eyes daily       ASPIRIN PO Take 81 mg by mouth daily       Acetaminophen (TYLENOL PO) Take 500 mg by mouth every 6 hours as needed for mild pain or fever       [DISCONTINUED] Methotrexate Sodium (METHOTREXATE PO) Take 2.5 mg by mouth once a week Patient takes 8 capsules every Friday            Physical Exam:   Blood pressure 148/77, pulse 94, temperature 98.4  F (36.9  C), temperature source Oral, height 1.715 m (5' 7.5\"), weight 70.9 kg (156 lb 6.4 oz).  Wt Readings from Last 4 Encounters:   11/17/17 70.9 kg (156 lb 6.4 oz)   05/19/17 75.2 kg (165 lb 12.8 oz)   02/17/17 74.8 kg (165 lb)   12/30/16 72.5 kg (159 lb 14.4 oz)     Constitutional: well-developed, appearing stated age; cooperative  Eyes: normal EOM, PERRLA, vision, conjunctiva, sclera  ENT: normal external ears, nose, hearing, lips, teeth, gums, throat, no mucous membrane lesions, normal saliva pool  Neck: no mass or thyroid enlargement  Resp: lungs clear to auscultation  CV: RRR, no murmurs, rubs or gallops, no edema  GI: no abdominal mass or tenderness, no organomegaly   : not tested  Lymph: no cervical, supraclavicular, or epitrochlear nodes  MS: The TMJ, neck, shoulder, elbow, wrist, MCP/PIP/DIP, spine, hip, knee, ankle, and foot MTP/IP joints were examined and found normal. No active synovitis or altered joint anatomy. Full joint ROM. Normal  strength. No " "dactylitis,  tenosynovitis, enthesopathy. Left knee arthrotomy scar. Prominence to right 5th metatarsal head.  Skin: no nail pitting, alopecia, rash, nodules or lesions  Neuro: normal cranial nerves, strength, sensation, DTR's  Psych: normal judgement, orientation, memory, affect         Data:   Outside Data:  Hep B/C serology negative    Lyme, Anaplasma negative  RF negative  CAMPBELL negative  Babesia negative  Ehrlichia negative  Parvovirus PCR negative  EBV IgG positive, IgM negative  Monoscreen negative  Immunoglobulins with normal IgA/G, low IgM (22, 50 lower limit of normal)  PTH 14.4 (normal)  Ferritin 1137.3 6/19/16    Left knee aspiration 11/29/15:  2106 cells, 95% PMN's, intracellular CPPD    Right knee aspiration June 2016: 13,830 cells, 92% PMN's, no crystals seen   Cr 1.1-1.2  HGB 10-11  WBC's 14-15 with PMN predominance, lymphopenia    Outside Radiology:  Echocardiogram 6/16/16 for \"fever unexplained\"  The left ventricle is normal size.  The left ventricular systolic function is hyperdynamic.  There is mild concentric hypertrophy.  Left atrium is mildly enlarged by volume.  The aortic valve is mildly sclerotic.  Moderate mitral valve annular calcification.  Borderline mitral valve stenosis.  Estimated RV systolic pressure is 43 mm Hg above right atrial pressure.  Small pericardial effusion.  There is no echocardiographic evidence of endocarditis.    CT abdomen/pelvis   1. No acute obstructive or inflammatory abdominopelvic process.  2. Moderate to severe colonic diverticulosis without diverticulitis. Mild constipation.  3. Mild cardiomegaly, aortic valve and mitral annular calcifications, better assessed by nonemergent cardiac echo. Also coronary arterial calcifications.  4. Moderate generalized osteopenia. Outpatient DEXA scan recommended.  5. Mild diffuse bladder mural thickening versus underdistention could be due to chronic bladder outlet obstruction due to prostatomegaly. Infectious cystitis felt to " "be less likely.     CTA:  1. Mild-to-moderate bilateral basilar infiltrates with small effusions.  2. In the major pulmonary arteries and first divisions there is no evidence for pulmonary emboli. The distal branches are difficult to see.    Colonoscopy:  Rectal exam was preformed and was Normal. The olympus videoendoscope was inserted into the anus and passed without difficulty to the cecum. TI normal. Cecum was identified by coelesence of the tinea, visualization of the appendiceal orifice and visualization of the ileocecal valve. The scope was slowly withdrawn and all walls of the colon were visualized. No polyps or masses or inflammation was seen. Sigmoid Diverticulosis was identified. Upon reaching the rectum the scope was retroverted and minimal internal hemorrhoids were identified. No abnormalities were noted.    Endoscopy:  The olympus scope was inserted via the oropharyx passed without difficulty via the esophagus and into the stomach. The scope was then advanced into the duodenum. Duodenum was normal. Duodenum biopsies were not obtained. The scope was slowly withdrawn the duodenum and stomach was examined. Forward and retroflexion views were performed. Stomach was had preplyoic gastritis. Gastric biopsies were obtained. A sliding hiatal hernia was identified. The GE junction was at 36 cm. GE junction had bowen's like changes. Gastro-esophageal biopsies were obtained. The scope was removed from the patient and esophagus examined. The esophagus was normal.    Endoscopic biopsies:  A) Specimen designated \"prepyloric\", biopsy:  Reactive gastropathy with focal erosion (see comment)  No Helicobacter pylori organisms identified on immunohistochemical  stain  B) GE junction, biopsy:  Mildly inflamed gastric cardiac type mucosa with scant squamous  epithelium  No goblet cell metaplasia identified      Shoulder x-ray: moderate severe left GH arthritis, moderate right AC OA with spurring    Bilateral hand x-ray: " bilateral OA of DIP's and MCP's    Bilateral wrist x-rays in PACS: chondrocalcinosis of bilateral triangular cartilages    Results for orders placed or performed in visit on 08/16/17   CRP inflammation   Result Value Ref Range    CRP Inflammation 24.1 (H) 0.0 - 8.0 mg/L   ESR   Result Value Ref Range    Sed Rate 16 0 - 20 mm/h   CBC with platelets differential   Result Value Ref Range    WBC 7.0 4.0 - 11.0 10e9/L    RBC Count 4.29 (L) 4.4 - 5.9 10e12/L    Hemoglobin 12.9 (L) 13.3 - 17.7 g/dL    Hematocrit 38.4 (L) 40.0 - 53.0 %    MCV 90 78 - 100 fl    MCH 30.1 26.5 - 33.0 pg    MCHC 33.6 31.5 - 36.5 g/dL    RDW 16.2 (H) 10.0 - 15.0 %    Platelet Count 239 150 - 450 10e9/L    Diff Method Automated Method     % Neutrophils 57.8 %    % Lymphocytes 23.3 %    % Monocytes 13.9 %    % Eosinophils 4.1 %    % Basophils 0.9 %    Absolute Neutrophil 4.1 1.6 - 8.3 10e9/L    Absolute Lymphocytes 1.6 0.8 - 5.3 10e9/L    Absolute Monocytes 1.0 0.0 - 1.3 10e9/L    Absolute Eosinophils 0.3 0.0 - 0.7 10e9/L    Absolute Basophils 0.1 0.0 - 0.2 10e9/L   Comprehensive metabolic panel   Result Value Ref Range    Sodium 139 133 - 144 mmol/L    Potassium 4.3 3.4 - 5.3 mmol/L    Chloride 103 94 - 109 mmol/L    Carbon Dioxide 28 20 - 32 mmol/L    Anion Gap 8 3 - 14 mmol/L    Glucose 96 70 - 99 mg/dL    Urea Nitrogen 16 7 - 30 mg/dL    Creatinine 1.09 0.66 - 1.25 mg/dL    GFR Estimate 64 >60 mL/min/1.7m2    GFR Estimate If Black 77 >60 mL/min/1.7m2    Calcium 9.0 8.5 - 10.1 mg/dL    Bilirubin Total 0.4 0.2 - 1.3 mg/dL    Albumin 3.2 (L) 3.4 - 5.0 g/dL    Protein Total 6.5 (L) 6.8 - 8.8 g/dL    Alkaline Phosphatase 62 40 - 150 U/L    ALT 16 0 - 70 U/L    AST 14 0 - 45 U/L     Reviewed Rheumatology lab flowsheet    I saw the patient with the fellow.  My exam and recomendations are as described.      Andres Mann MD

## 2017-11-22 LAB — IL6 SERPL-MCNC: 5.14 PG/ML

## 2018-01-02 ENCOUNTER — MYC MEDICAL ADVICE (OUTPATIENT)
Dept: RHEUMATOLOGY | Facility: CLINIC | Age: 83
End: 2018-01-02

## 2018-01-02 NOTE — TELEPHONE ENCOUNTER
Called and spoke with pt, he finished prednisone taper on 1/1/18, and then last night, went to bed, looked at his watch and had the sensation of feeling dizzy.  Pt was reminded of the time that he was dehydrated, wondering if that was it, so he drank 3 glasses of water and went to bed.  Pt states after that he felt better.  Has been increasing his water intake today, and has decreased his coffee intake.  Pt is feeling better, called PCP who stated to wait a couple of day before thinking about restarting prednisone.  Pt has had no vomiting or diarrhea, but does drink a lot of coffee and not a lot of water.  He will continue to be aware of his coffee and water intake, but is wondering if this is a sign that he should be back on prednisone.      Will send to provider for review and advice.    Daily Rivera RN  Rheumatology Clinic

## 2018-01-05 NOTE — TELEPHONE ENCOUNTER
Wife answered, Victor M had stepped out a moment. Wife stated he is feeling much better. Will try to call back

## 2018-01-09 NOTE — TELEPHONE ENCOUNTER
Finn Navarrete MD Beard, Madeline, RN        Caller: Unspecified (1 week ago,  4:28 PM)       Phone Number: [phone]                     Dickson Barber,     Could you let Mr. Akers know that dizziness is not a usual symptom after discontinuation of prednisone? We tapered his prednisone very slowly (1 mg/day every month) and thus it's not likely he is withdrawing.     I also seem to recall that he has had some ongoing trouble with dizziness/vertigo that has been managed by his PCP. He should probably make an appointment with him or her to discuss.     Thanks.     Tho            Previous Messages          Left message requesting return call to clinic.    Daily Rivera RN  Rheumatology Clinic

## 2018-01-10 NOTE — TELEPHONE ENCOUNTER
Pt returned call to clinic, discussed that he is going to see PCP tomorrow.  Explained Dr. Porras notes and pt states he is feeling much better at this time.    Daily Rivera RN  Rheumatology Clinic

## 2018-02-26 ENCOUNTER — TELEPHONE (OUTPATIENT)
Dept: RHEUMATOLOGY | Facility: CLINIC | Age: 83
End: 2018-02-26

## 2018-02-26 DIAGNOSIS — Z87.39 HISTORY OF CALCIUM PYROPHOSPHATE DEPOSITION DISEASE (CPPD): ICD-10-CM

## 2018-02-26 DIAGNOSIS — R79.82 ELEVATED C-REACTIVE PROTEIN: Primary | ICD-10-CM

## 2018-02-26 NOTE — TELEPHONE ENCOUNTER
----- Message from Santiago Persaud LPN sent at 2/26/2018 10:56 AM CST -----  Regarding: FW: Dr. Waggoner Patient - Daughter calling because his numbers are high again  Contact: [phone]      ----- Message -----     From: Anthony De Jesus     Sent: 2/24/2018  10:45 AM       To: Adult Rheum Triage-[facility]  Subject: Dr. Waggoner Patient - Daughter calling because#    Good Morning Team,    Patients Daughter called today because she was concerned because some recent labs showed the patients levels rising in possible correlation to the tapering off of the prednisone. The lab results were supposed to have been faxed to the clinic by the patients [place] Provider. I did have the patients Daugther call the on call in the mean time since the clinic would not get in touch with them until Monday to discuss what they should do over the weekend. Please call them back at [phone] to discuss what to do further in regards to the medication and the elevated levels.    Thank you    Anthony :)  Senior Intake Rep Team 3  Central Scheduling    Please DO NOT send this message and/or reply back to sender.  Call Center Representatives DO NOT respond to messages.

## 2018-02-27 NOTE — TELEPHONE ENCOUNTER
Shannan, pt's daughter, called to inquire if labs faxed from [facility] was received.    Please callback Shannan at [phone].

## 2018-02-27 NOTE — TELEPHONE ENCOUNTER
Message had been left for Amarilis yesterday asking that she return call. Amarilis called back today. They had received lab results from their rheumatologist and wanted Rosalba to tell them what to do. At the time of this call labs had not been received and asked Amarilis to contact the clinic with our correct fax number. She is agreeable to this plan.    IFTIKHAR GarciaN RN  Rheumatology RN Coordinator  [facility]

## 2018-02-28 LAB
ABSOLUTE BASOPHILS (EXTERNAL): 0.04 (ref 0–0.2)
ALBUMIN SERPL-MCNC: 3.4 G/DL (ref 3.5–6)
ALP SERPL-CCNC: 90 U/L (ref 45–122)
ALT SERPL-CCNC: 13 U/L (ref 18–66)
ANION GAP SERPL CALCULATED.3IONS-SCNC: 7 MMOL/L
BILIRUB SERPL-MCNC: 0.4 MG/DL (ref 0.2–1)
BUN SERPL-MCNC: 12 MG/DL
CALCIUM SERPL-MCNC: 9.3 MG/DL
CHLORIDE SERPLBLD-SCNC: 103 MMOL/L
CO2 SERPL-SCNC: 29 MMOL/L
CREAT SERPL-MCNC: 1.04 MG/DL
CREAT SERPL-MCNC: 1.04 MG/DL (ref 0.8–1.5)
CRP SERPL-MCNC: 39.4 MG/L (ref 0–5)
EOSINOPHIL # BLD AUTO: 0.2 10*3/UL (ref 0.04–0.54)
ERYTHROCYTE [DISTWIDTH] IN BLOOD BY AUTOMATED COUNT: 16 % (ref 11.6–14.4)
ERYTHROCYTE [SEDIMENTATION RATE] IN BLOOD: 28 MM/HR
GFR SERPL CREATININE-BSD FRML MDRD: >60 ML/MIN/1.73M2
GFR SERPL CREATININE-BSD FRML MDRD: >60 ML/MIN/1.73M2
GLUCOSE SERPL-MCNC: 94 MG/DL (ref 70–99)
HCT VFR BLD AUTO: 39.3 % (ref 40.1–51)
HEMOGLOBIN: 12.9 G/DL (ref 13.7–17.5)
LYMPHOCYTES # BLD AUTO: 1.39 10*3/UL (ref 1.18–3.74)
MCH RBC QN AUTO: 29.1 PG (ref 25.8–32.2)
MCHC RBC AUTO-ENTMCNC: 32.8 G/DL (ref 32.2–36.5)
MCV RBC AUTO: 88.6 FL (ref 80–98)
MONOCYTES # BLD AUTO: 0.89 10*3/UL (ref 0.24–0.82)
NEUTROPHILS # BLD AUTO: 2.76 10*3/UL (ref 1.56–6.13)
NUCLEATED RBCS (EXTERNAL): 0
PLATELET COUNT - QUEST: 285 10^9/L (ref 150–450)
PMV BLD: 9.8 FL (ref 9.1–12.4)
POTASSIUM SERPL-SCNC: 3.6 MMOL/L
RBC # BLD AUTO: 4.44 10^12/L (ref 4.63–8.08)
SODIUM SERPL-SCNC: 139 MMOL/L
WBC # BLD AUTO: 5.3 10^9/L (ref 4–10)

## 2018-03-01 DIAGNOSIS — Z51.81 ENCOUNTER FOR THERAPEUTIC DRUG MONITORING: ICD-10-CM

## 2018-03-01 DIAGNOSIS — Z87.39 HX OF CALCIUM PYROPHOSPHATE DEPOSITION DISEASE (CPPD): ICD-10-CM

## 2018-03-01 DIAGNOSIS — Z87.39 HISTORY OF CALCIUM PYROPHOSPHATE DEPOSITION DISEASE (CPPD): ICD-10-CM

## 2018-03-01 DIAGNOSIS — M72.2 PLANTAR FASCIITIS: ICD-10-CM

## 2018-03-01 DIAGNOSIS — M35.3 PMR (POLYMYALGIA RHEUMATICA) (H): ICD-10-CM

## 2018-03-01 DIAGNOSIS — D64.9 ANEMIA, UNSPECIFIED TYPE: ICD-10-CM

## 2018-03-01 NOTE — TELEPHONE ENCOUNTER
methotrexate 2.5 MG  Last Written Prescription Date:  11/17/17  Last Fill Quantity: 32  # refills:  2  Last Office Visit: 11/17/17  Future Office visit:  5/18/18    CBC RESULTS:   Recent Labs   Lab Test  02/16/18   1026   WBC  5.3   RBC  4.44*   HGB  12.9*   HCT  39.3*   MCV  88.60   MCH  29.1   MCHC  32.8   RDW  16.0*   PLT  285       Creatinine   Date Value Ref Range Status   02/16/2018 1.04 mg/dL Final   02/16/2018 1.04 0.80 - 1.50 mg/dL Final   ]    Liver Function Studies -   Recent Labs   Lab Test  02/16/18   1026  11/17/17   0918   PROTTOTAL   --   7.0   ALBUMIN  3.4*  3.6   BILITOTAL  0.4  0.3   ALKPHOS  90  80   AST   --   16   ALT  13*  20       Routing refill request to provider for review/approval

## 2018-03-01 NOTE — TELEPHONE ENCOUNTER
Results have been received and abstracted. Sent to Dr Goyal to review and recommendations.    IFTIKHAR GarciaN RN  Rheumatology RN Coordinator  [facility]

## 2018-03-02 NOTE — TELEPHONE ENCOUNTER
Received the following staff message from Dr Goyal:    Tee Goyal MD   Adult Rheum Triage[facility] Yesterday (3:19 PM)           Only the short-term marker of inflammation is elevated modestly.  If the patient is not having significant inflammatory joint symptoms (swelling redness warmth) I recommend simply repeating the C-reactive protein in about 1 week. (Routing comment)

## 2018-03-02 NOTE — TELEPHONE ENCOUNTER
Returned call to Radha with Dr Goyal's recommendations. Radha verbalized understanding and will pass this on to the pt. They will be able to have the labs done [facility] close to their [place].    Order for the CRP placed per note below.    Sunil Lester, IFTIKHARN RN  Rheumatology RN Coordinator  [facility]

## 2018-03-08 DIAGNOSIS — Z87.39 HISTORY OF CALCIUM PYROPHOSPHATE DEPOSITION DISEASE (CPPD): ICD-10-CM

## 2018-03-08 DIAGNOSIS — R79.82 ELEVATED C-REACTIVE PROTEIN: ICD-10-CM

## 2018-03-08 LAB — CRP SERPL-MCNC: 50.7 MG/L (ref 0–8)

## 2018-03-08 PROCEDURE — 36415 COLL VENOUS BLD VENIPUNCTURE: CPT | Mod: ZL | Performed by: STUDENT IN AN ORGANIZED HEALTH CARE EDUCATION/TRAINING PROGRAM

## 2018-03-08 PROCEDURE — 86140 C-REACTIVE PROTEIN: CPT | Mod: ZL | Performed by: STUDENT IN AN ORGANIZED HEALTH CARE EDUCATION/TRAINING PROGRAM

## 2018-03-09 ENCOUNTER — TELEPHONE (OUTPATIENT)
Dept: RHEUMATOLOGY | Facility: CLINIC | Age: 83
End: 2018-03-09

## 2018-03-09 NOTE — TELEPHONE ENCOUNTER
Called and spoke with pt regarding how she is doing. He is having pain in both legs on the front of the thighs and it is a pinching sensation. Pt states is comes and goes, pain starts when he is in bed.  Had last night, but seems to feel ok.      Pt is wondering what he can take to help with pain.  He has been using Tylenol 1000 mg a day to help with pain. Dr Navarrete is fine with that, states he can use it up to 2 times a day.    Pt had CRP rechecked per Dr. Goyal, in encounter from 2/26.      Pt is wondering if exercise could cause it, discussed with Dr. Navarrete, it could, but he does not want him to stop exercising.  Updated pt with information, let him know that he can always make an earlier appt if the pain is bothering him.  Pt understands, no further questions at this time.    Daily Rivera RN  Rheumatology Clinic

## 2018-03-12 NOTE — TELEPHONE ENCOUNTER
Pt called back today. He says he was told to call if he had any swelling. He reports some swelling in the middle of his left foot, near the arch. Wanted to let the clinic know and make sure this is not concerning.  Brenna Baez RN

## 2018-03-12 NOTE — TELEPHONE ENCOUNTER
Called and spoke with pt regarding left foot swelling.  Pt reports that today he noticed that his left ankle joint is slightly red and swollen.  States that he does have pain, 3-4/10, some stiffness, but is able to move it and walk on it.  Pt reports that he has not taken anything for pain at this time, but will take tylenol if necessary.  Pt states that the last time this happened, he went and saw his podiatrist in [place] and got a cortisone shot.  Pt will be calling podiatrists office to see if he can be seen to address this issue.  But would like Dr. Navarrete's opinion as well.    Daily Rivera RN  Rheumatology Clinic

## 2018-03-13 NOTE — TELEPHONE ENCOUNTER
Finn Navarrete MD Beard, Madeline, RN       Caller: Unspecified (4 days ago, 11:32 AM)                     Could be pseudogout. Injection is reasonable, I would be happy to assess if his podiatrist does not.     Alternatively, we could treat him with prednisone 40 mg daily for 5 days then 20 mg daily for 5 days if he prefers that or his podiatrist is not available to inject it and he doesn't want to drive down to the [facility].     Thanks.     Tho       Left message requesting pt return call.    Daily Rivera RN  Rheumatology Clinic

## 2018-03-15 NOTE — TELEPHONE ENCOUNTER
Called and spoke with pt regarding ankle pain.  He was able to get into see his podiatrist, was given a cortisone injection, states that it feels fine now.  Was able to bowl on Tuesday.  Pt was discussing prednisone, I did let him know that if he feels fine he does not have to do the prescription that Dr. Navarrete discussed, any other prednisone he should discuss with Dr. Martinez.    Daily Rivera RN  Rheumatology Clinic

## 2018-04-10 DIAGNOSIS — M11.20 PSEUDOGOUT: Primary | ICD-10-CM

## 2018-04-10 NOTE — TELEPHONE ENCOUNTER
Amarilis, pt's daughter, called with quiestions about flares & medicaiton.    Amarilis states pt is having a flare in shoulders & hips, for appx 3-4 wks, & has questions about the prednisone & methotrexate directions. Pt did not start the prednisone, yet.    Amarilis also says pt had a cortisone shot appx 2 wks ago in [place]. Amarilis was not sure on whot joint pt received the shot.    Please callback Amarilis at [phone].

## 2018-04-10 NOTE — TELEPHONE ENCOUNTER
Called and talked with daughter about Christian's pain.  He is off prednisone since January, and is currently on 6 (2.5mg) tabs of MTX weekly. For the last 4 weeks his joint pains have increased in his shoulders and hips.  He had Cortisone shot in left shoulder which he said did nothing. He wants to know what he can do for this and if he can go on prednisone for a little to help with flare. He has no fever, there is no redness, he is just very sore with aches and pain in the joints of hip and shoulder.

## 2018-04-11 RX ORDER — PREDNISONE 10 MG/1
TABLET ORAL
Qty: 30 TABLET | Refills: 1 | Status: SHIPPED | OUTPATIENT
Start: 2018-04-11 | End: 2020-01-08

## 2018-04-12 ENCOUNTER — TELEPHONE (OUTPATIENT)
Dept: RHEUMATOLOGY | Facility: CLINIC | Age: 83
End: 2018-04-12

## 2018-04-12 DIAGNOSIS — M72.2 PLANTAR FASCIITIS: ICD-10-CM

## 2018-04-12 DIAGNOSIS — Z51.81 ENCOUNTER FOR THERAPEUTIC DRUG MONITORING: ICD-10-CM

## 2018-04-12 DIAGNOSIS — M35.3 PMR (POLYMYALGIA RHEUMATICA) (H): ICD-10-CM

## 2018-04-12 DIAGNOSIS — Z87.39 HX OF CALCIUM PYROPHOSPHATE DEPOSITION DISEASE (CPPD): ICD-10-CM

## 2018-04-12 DIAGNOSIS — D64.9 ANEMIA, UNSPECIFIED TYPE: ICD-10-CM

## 2018-04-12 DIAGNOSIS — Z87.39 HISTORY OF CALCIUM PYROPHOSPHATE DEPOSITION DISEASE (CPPD): ICD-10-CM

## 2018-04-12 RX ORDER — ALENDRONATE SODIUM 70 MG/1
70 TABLET ORAL WEEKLY
Qty: 12 TABLET | Refills: 3 | Status: SHIPPED | OUTPATIENT
Start: 2018-04-12 | End: 2018-04-27

## 2018-04-12 NOTE — TELEPHONE ENCOUNTER
[facility] Call Center    Phone Message    May a detailed message be left on voicemail: yes    Reason for Call: Other: ARYA Henderson - Patient's daughter Claudia called back and accepted 4/27 @ 830am.  I schedued it in [facility], FYI     Action Taken: Message routed to:  [facility] ([facility]): gabby

## 2018-04-12 NOTE — TELEPHONE ENCOUNTER
----- Message from Santiago Persaud LPN sent at 4/12/2018  8:35 AM CDT -----  Regarding: FW: Needs appointment  Okay sounds good. I will call his daughter today to figure out when he can come here. I will message you back with information.     Santiago KOENIG  Rheumatology     ----- Message -----     From: Finn Navarrete MD     Sent: 4/11/2018   8:59 PM       To: Santiago Persaud LPN  Subject: Needs appointment                                Dickson Henderson,    Thank you for fielding the call from Mr. Akers and his family. I think I should see him in person to figure out what is going on and what we should do medically about it. Can you arrange for him to see me first available?    Thanks.    Tho Navarrete MD  Rheumatology Fellow  [phone]

## 2018-04-12 NOTE — TELEPHONE ENCOUNTER
Called to offer Christian an appointment for tomorrow with Dr Navarrete. She said that's too soon.  I then called back and left message that he has an opening on 4/27/2018 as well @830, and if she would like to bring her dad then to please call back and confirm.

## 2018-04-27 ENCOUNTER — OFFICE VISIT (OUTPATIENT)
Dept: RHEUMATOLOGY | Facility: CLINIC | Age: 83
End: 2018-04-27
Attending: STUDENT IN AN ORGANIZED HEALTH CARE EDUCATION/TRAINING PROGRAM
Payer: MEDICARE

## 2018-04-27 VITALS
SYSTOLIC BLOOD PRESSURE: 167 MMHG | BODY MASS INDEX: 22.69 KG/M2 | HEIGHT: 68 IN | HEART RATE: 82 BPM | OXYGEN SATURATION: 98 % | TEMPERATURE: 98.1 F | DIASTOLIC BLOOD PRESSURE: 78 MMHG | WEIGHT: 149.7 LBS

## 2018-04-27 DIAGNOSIS — M35.3 PMR (POLYMYALGIA RHEUMATICA) (H): ICD-10-CM

## 2018-04-27 DIAGNOSIS — D64.9 ANEMIA, UNSPECIFIED TYPE: ICD-10-CM

## 2018-04-27 DIAGNOSIS — M72.2 PLANTAR FASCIITIS: ICD-10-CM

## 2018-04-27 DIAGNOSIS — Z51.81 ENCOUNTER FOR THERAPEUTIC DRUG MONITORING: ICD-10-CM

## 2018-04-27 DIAGNOSIS — Z87.39 HX OF CALCIUM PYROPHOSPHATE DEPOSITION DISEASE (CPPD): ICD-10-CM

## 2018-04-27 DIAGNOSIS — Z87.39 HISTORY OF CALCIUM PYROPHOSPHATE DEPOSITION DISEASE (CPPD): ICD-10-CM

## 2018-04-27 LAB
ALBUMIN SERPL-MCNC: 3.4 G/DL (ref 3.4–5)
ALP SERPL-CCNC: 83 U/L (ref 40–150)
ALT SERPL W P-5'-P-CCNC: 28 U/L (ref 0–70)
ANION GAP SERPL CALCULATED.3IONS-SCNC: 8 MMOL/L (ref 3–14)
AST SERPL W P-5'-P-CCNC: 20 U/L (ref 0–45)
BASOPHILS # BLD AUTO: 0 10E9/L (ref 0–0.2)
BASOPHILS NFR BLD AUTO: 0.4 %
BILIRUB SERPL-MCNC: 0.3 MG/DL (ref 0.2–1.3)
BUN SERPL-MCNC: 18 MG/DL (ref 7–30)
CALCIUM SERPL-MCNC: 9.3 MG/DL (ref 8.5–10.1)
CHLORIDE SERPL-SCNC: 102 MMOL/L (ref 94–109)
CO2 SERPL-SCNC: 28 MMOL/L (ref 20–32)
CREAT SERPL-MCNC: 0.97 MG/DL (ref 0.66–1.25)
CRP SERPL-MCNC: 34 MG/L (ref 0–8)
DIFFERENTIAL METHOD BLD: ABNORMAL
EOSINOPHIL # BLD AUTO: 0 10E9/L (ref 0–0.7)
EOSINOPHIL NFR BLD AUTO: 0.1 %
ERYTHROCYTE [DISTWIDTH] IN BLOOD BY AUTOMATED COUNT: 17.3 % (ref 10–15)
ERYTHROCYTE [SEDIMENTATION RATE] IN BLOOD BY WESTERGREN METHOD: 30 MM/H (ref 0–20)
GFR SERPL CREATININE-BSD FRML MDRD: 73 ML/MIN/1.7M2
GLUCOSE SERPL-MCNC: 101 MG/DL (ref 70–99)
HCT VFR BLD AUTO: 38.6 % (ref 40–53)
HGB BLD-MCNC: 12.6 G/DL (ref 13.3–17.7)
IMM GRANULOCYTES # BLD: 0.1 10E9/L (ref 0–0.4)
IMM GRANULOCYTES NFR BLD: 0.8 %
LYMPHOCYTES # BLD AUTO: 0.8 10E9/L (ref 0.8–5.3)
LYMPHOCYTES NFR BLD AUTO: 10 %
MCH RBC QN AUTO: 28.6 PG (ref 26.5–33)
MCHC RBC AUTO-ENTMCNC: 32.6 G/DL (ref 31.5–36.5)
MCV RBC AUTO: 88 FL (ref 78–100)
MONOCYTES # BLD AUTO: 0.5 10E9/L (ref 0–1.3)
MONOCYTES NFR BLD AUTO: 6.7 %
NEUTROPHILS # BLD AUTO: 6.2 10E9/L (ref 1.6–8.3)
NEUTROPHILS NFR BLD AUTO: 82 %
NRBC # BLD AUTO: 0 10*3/UL
NRBC BLD AUTO-RTO: 0 /100
PLATELET # BLD AUTO: 280 10E9/L (ref 150–450)
POTASSIUM SERPL-SCNC: 4 MMOL/L (ref 3.4–5.3)
PROT SERPL-MCNC: 7.3 G/DL (ref 6.8–8.8)
RBC # BLD AUTO: 4.4 10E12/L (ref 4.4–5.9)
SODIUM SERPL-SCNC: 138 MMOL/L (ref 133–144)
WBC # BLD AUTO: 7.6 10E9/L (ref 4–11)

## 2018-04-27 PROCEDURE — 85652 RBC SED RATE AUTOMATED: CPT | Performed by: STUDENT IN AN ORGANIZED HEALTH CARE EDUCATION/TRAINING PROGRAM

## 2018-04-27 PROCEDURE — 85025 COMPLETE CBC W/AUTO DIFF WBC: CPT | Performed by: STUDENT IN AN ORGANIZED HEALTH CARE EDUCATION/TRAINING PROGRAM

## 2018-04-27 PROCEDURE — G0463 HOSPITAL OUTPT CLINIC VISIT: HCPCS | Mod: ZF

## 2018-04-27 PROCEDURE — 80053 COMPREHEN METABOLIC PANEL: CPT | Performed by: STUDENT IN AN ORGANIZED HEALTH CARE EDUCATION/TRAINING PROGRAM

## 2018-04-27 PROCEDURE — 36415 COLL VENOUS BLD VENIPUNCTURE: CPT | Performed by: STUDENT IN AN ORGANIZED HEALTH CARE EDUCATION/TRAINING PROGRAM

## 2018-04-27 PROCEDURE — 86140 C-REACTIVE PROTEIN: CPT | Performed by: STUDENT IN AN ORGANIZED HEALTH CARE EDUCATION/TRAINING PROGRAM

## 2018-04-27 RX ORDER — PREDNISONE 10 MG/1
40 TABLET ORAL DAILY
Qty: 40 TABLET | Refills: 1 | Status: SHIPPED | OUTPATIENT
Start: 2018-04-27 | End: 2018-04-27

## 2018-04-27 RX ORDER — ALENDRONATE SODIUM 70 MG/1
70 TABLET ORAL WEEKLY
Qty: 12 TABLET | Refills: 3 | Status: SHIPPED | OUTPATIENT
Start: 2018-04-27 | End: 2018-10-26

## 2018-04-27 RX ORDER — PREDNISONE 5 MG/1
5 TABLET ORAL DAILY
Qty: 90 TABLET | Refills: 1 | Status: SHIPPED | OUTPATIENT
Start: 2018-04-27 | End: 2018-10-16

## 2018-04-27 ASSESSMENT — PAIN SCALES - GENERAL: PAINLEVEL: NO PAIN (0)

## 2018-04-27 NOTE — MR AVS SNAPSHOT
After Visit Summary   4/27/2018    Christian Akers    MRN: [id number]           Patient Information     Date Of Birth          2/16/1930        Visit Information        Provider Department      4/27/2018 8:30 AM Finn Navarrete MD [facility] Rhe[facility]        Today's Diagnoses     PMR (polymyalgia rheumatica) (H)        Hx of calcium pyrophosphate deposition disease (CPPD)        History of calcium pyrophosphate deposition disease (CPPD)        Encounter for therapeutic drug monitoring        Plantar fasciitis        Anemia, unspecified type          Care Instructions    Stay at 6 tablets/week of methotrexate.    Stay on prednisone 5 mg daily.    For flares of inflammation in joints: prednisone 40 mg daily for 5 days.    I asked Sunil to inquire about family members discussing medical issues with our staff.    Labs today and in 3 months.    Follow up with Dr. Goyal in 6 months          Follow-ups after your visit        Follow-up notes from your care team     Return in about 6 months (around 10/27/2018).      Your next 10 appointments already scheduled     Apr 27, 2018  9:45 AM CDT   Lab with  LAB   [facility] ([facility])    [address] [place] [address]  1st Floor  [employer] [address] [phone]            Oct 26, 2018  9:00 AM CDT   (Arrive by 8:45 AM)   Return Visit with Tee Goyal MD   [facility] ([facility])    [address] [place] [address]  [address] [place] [id number] [phone]              Future tests that were ordered for you today     Open Future Orders        Priority Expected Expires Ordered    AST Routine  4/27/2019 4/27/2018    ALT Routine  4/27/2019 4/27/2018    Comprehensive metabolic panel Routine  4/27/2019 4/27/2018    CBC with platelets differential Routine  4/27/2019 4/27/2018    CRP inflammation Routine  4/27/2019 4/27/2018    Erythrocyte sedimentation rate auto Routine  4/27/2019 4/27/2018             Who to contact     If you have questions or need follow up information about today's clinic visit or your schedule please contact [facility] directly at [phone].  Normal or non-critical lab and imaging results will be communicated to you by Boundlesshart, letter or phone within 4 business days after the clinic has received the results. If you do not hear from us within 7 days, please contact the clinic through Boundlesshart or phone. If you have a critical or abnormal lab result, we will notify you by phone as soon as possible.  Submit refill requests through [email or web address] or call your pharmacy and they will forward the refill request to us. Please allow 3 business days for your refill to be completed.          Additional Information About Your Visit        [email or web address] Information     [email or web address] gives you secure access to your electronic health record. If you see a primary care provider, you can also send messages to your care team and make appointments. If you have questions, please call your primary care clinic.  If you do not have a primary care provider, please call [phone] and they will assist you.        Care EveryWhere ID     This is your Care EveryWhere ID. This could be used by other organizations to access your [place] medical records  [id number]         Blood Pressure from Last 3 Encounters:   11/17/17 148/77   05/19/17 150/79   02/17/17 137/79    Weight from Last 3 Encounters:   11/17/17 70.9 kg (156 lb 6.4 oz)   05/19/17 75.2 kg (165 lb 12.8 oz)   02/17/17 74.8 kg (165 lb)                 Today's Medication Changes          These changes are accurate as of 4/27/18  9:32 AM.  If you have any questions, ask your nurse or doctor.               These medicines have changed or have updated prescriptions.        Dose/Directions    cholecalciferol 1000 UNIT tablet   Commonly known as:  vitamin D3   This may have changed:  medication strength   Used for:  Hx of calcium pyrophosphate deposition disease (CPPD)    Changed by:  Finn Navarrete MD        Dose:  1000 Units   Take 1 tablet (1,000 Units) by mouth daily   Quantity:  30 tablet   Refills:  11       methotrexate 2.5 MG tablet CHEMO   This may have changed:    - how much to take  - additional instructions   Used for:  PMR (polymyalgia rheumatica) (H), Hx of calcium pyrophosphate deposition disease (CPPD), History of calcium pyrophosphate deposition disease (CPPD), Encounter for therapeutic drug monitoring, Plantar fasciitis, Anemia, unspecified type   Changed by:  Finn Navarrete MD        Dose:  15 mg   Take 6 tablets (15 mg) by mouth once a week   Quantity:  24 tablet   Refills:  2       * predniSONE 10 MG tablet   Commonly known as:  DELTASONE   This may have changed:    - Another medication with the same name was added. Make sure you understand how and when to take each.  - Another medication with the same name was changed. Make sure you understand how and when to take each.   Used for:  Pseudogout   Changed by:  Finn Navarrete MD        40 mg daily for 5 days, then 20 mg daily for 5 days then off   Quantity:  30 tablet   Refills:  1       * predniSONE 5 MG tablet   Commonly known as:  DELTASONE   This may have changed:    - medication strength  - how much to take  - additional instructions   Used for:  Hx of calcium pyrophosphate deposition disease (CPPD), PMR (polymyalgia rheumatica) (H), History of calcium pyrophosphate deposition disease (CPPD), Encounter for therapeutic drug monitoring, Plantar fasciitis, Anemia, unspecified type   Changed by:  Finn Navarrete MD        Dose:  5 mg   Take 1 tablet (5 mg) by mouth daily   Quantity:  90 tablet   Refills:  1       * predniSONE 10 MG tablet   Commonly known as:  DELTASONE   This may have changed:  You were already taking a medication with the same name, and this prescription was added. Make sure you understand how and when to take each.   Used for:  Hx of calcium pyrophosphate deposition disease (CPPD), PMR  (polymyalgia rheumatica) (H), History of calcium pyrophosphate deposition disease (CPPD), Encounter for therapeutic drug monitoring, Plantar fasciitis, Anemia, unspecified type   Changed by:  Finn Navarrete MD        Dose:  40 mg   Take 4 tablets (40 mg) by mouth daily for 5 days for 3-5 days for inflamed, swollen joint flares   Quantity:  40 tablet   Refills:  1       * Notice:  This list has 3 medication(s) that are the same as other medications prescribed for you. Read the directions carefully, and ask your doctor or other care provider to review them with you.         Where to get your medicines      These medications were sent to [facility]  [place] - [address]  [address] [facility] [address]     Phone:  [phone]     alendronate 70 MG tablet    calcium-vitamin D 600-400 MG-UNIT per tablet    cholecalciferol 1000 UNIT tablet    methotrexate 2.5 MG tablet CHEMO    predniSONE 10 MG tablet    predniSONE 5 MG tablet                Primary Care Provider Office Phone # Fax #    Luana Martinez [phone]       [facility] [address] [facility] [address]   [facility] [address]        Equal Access to Services     GERBER [facility] CenterNICOLASA AH: Hadii toya ku hadasho Soomaali, waaxda luqadaha, qaybta kaalmada adeegyada, juanita payton . [facility] [phone].    ATENCIÓN: Si habla español, tiene a sultana disposición servicios gratuitos de asistencia lingüística. [facility] [phone].    We comply with applicable federal civil rights laws and [place] laws. We do not discriminate on the basis of race, color, national origin, age, disability, sex, sexual orientation, or gender identity.            Thank you!     Thank you for choosing [facility] RHEUMATOLOG[facility]  for your care. Our goal is always to provide you with excellent care. Hearing back from our patients is one way we can continue to improve our services. Please take a few minutes to complete the written survey that you may  receive in the mail after your visit with us. Thank you!             Your Updated Medication List - Protect others around you: Learn how to safely use, store and throw away your medicines at www.disposemymeds.org.          This list is accurate as of 4/27/18  9:32 AM.  Always use your most recent med list.                   Brand Name Dispense Instructions for use Diagnosis    alendronate 70 MG tablet    FOSAMAX    12 tablet    Take 1 tablet (70 mg) by mouth once a week Takes every Sunday    History of calcium pyrophosphate deposition disease (CPPD), PMR (polymyalgia rheumatica) (H), Hx of calcium pyrophosphate deposition disease (CPPD), Encounter for therapeutic drug monitoring, Plantar fasciitis, Anemia, unspecified type       AMLODIPINE BESYLATE PO      Take 5 mg by mouth daily        ASPIRIN PO      Take 81 mg by mouth daily        brimonidine 0.15 % ophthalmic solution    ALPHAGAN-P     Place 1 drop into both eyes daily        calcium-vitamin D 600-400 MG-UNIT per tablet    CALTRATE    60 tablet    Take 1 tablet by mouth 2 times daily    Hx of calcium pyrophosphate deposition disease (CPPD)       cholecalciferol 1000 UNIT tablet    vitamin D3    30 tablet    Take 1 tablet (1,000 Units) by mouth daily    Hx of calcium pyrophosphate deposition disease (CPPD)       FLOMAX 0.4 MG capsule   Generic drug:  tamsulosin      Take 0.8 mg by mouth daily        FOLIC ACID PO      Take 800 mcg by mouth daily        methotrexate 2.5 MG tablet CHEMO     24 tablet    Take 6 tablets (15 mg) by mouth once a week    PMR (polymyalgia rheumatica) (H), Hx of calcium pyrophosphate deposition disease (CPPD), History of calcium pyrophosphate deposition disease (CPPD), Encounter for therapeutic drug monitoring, Plantar fasciitis, Anemia, unspecified type       OMEPRAZOLE PO      Take 20 mg by mouth daily        * predniSONE 10 MG tablet    DELTASONE    30 tablet    40 mg daily for 5 days, then 20 mg daily for 5 days then off     Pseudogout       * predniSONE 5 MG tablet    DELTASONE    90 tablet    Take 1 tablet (5 mg) by mouth daily    Hx of calcium pyrophosphate deposition disease (CPPD), PMR (polymyalgia rheumatica) (H), History of calcium pyrophosphate deposition disease (CPPD), Encounter for therapeutic drug monitoring, Plantar fasciitis, Anemia, unspecified type       * predniSONE 10 MG tablet    DELTASONE    40 tablet    Take 4 tablets (40 mg) by mouth daily for 5 days for 3-5 days for inflamed, swollen joint flares    Hx of calcium pyrophosphate deposition disease (CPPD), PMR (polymyalgia rheumatica) (H), History of calcium pyrophosphate deposition disease (CPPD), Encounter for therapeutic drug monitoring, Plantar fasciitis, Anemia, unspecified type       TYLENOL PO      Take 500 mg by mouth every 6 hours as needed for mild pain or fever        * Notice:  This list has 3 medication(s) that are the same as other medications prescribed for you. Read the directions carefully, and ask your doctor or other care provider to review them with you.

## 2018-04-27 NOTE — PROGRESS NOTES
Rheumatology Clinic Visit     Christian Akers MRN# [id number]   YOB: 1930 Age: 88 year old     Date of Visit: 4/27/2018    Primary care provider: Luana Martinez MD, [facility]  Referring provider: Luana Martinez MD, [facility]         Assessment and Plan:   #Polyarticular inflammatory arthritis-right knee aspiration 7/2016 with 13,000 WBC's, no crystals  #Chondrocalcinosis of wrists, history of acute pseudogout left knee in 2015, left knee aspiration with 2100 WBC's, intracellular CPP, history of crystalline-sounding left ankle monoarthritis with corticosteroid injection January of 2017  #History of neck/shoulder pain, myalgia, weakness, elevated inflammatory markers, bilateral temporal artery biopsy negative November of 2016 but after 4-5 months of high dose prednisone  #Osteopenia  #Long-term use of high doses of corticosteroids  #Monoclonal proteinemia  #History of unprovoked DVT/PE in 2015, treated with 12 months anticoagulation  #Degenerative joint disease of bilateral hips, moderate-severe    I suspect most of his present pains are from his hip OA for which there are not many good therapies apart from joint replacement which he is not ready for. I also suspect that he is having intermittent flares of pseudogout, particularly during these episodes when he needs his ankle injected. Low-dose prednisone will likely help both the symptoms of OA as well as for prophylaxis against pseudogout. He can use higher doses for flares. Will continue methotrexate for PMR/pseudogout.    -prednisone 5 mg daily  -continue methotrexate 6 tablets weekly, hold on further tapering at present time  -if inflammatory arthritis flares, prednisone 40 mg daily for 5 days  -calcium, vitamin D, and a bisphosphonate given his existing osteopenia  -repeat labs today and in 3 months  -if he is having flares of mono or oligoarticular arthritis I would be very suspicious of  CPPD, could consider addition of Plaquenil or colchicine  -follow up in 6 months with Dr. Goyal or in [facility] vs following up closer with Dr. Martinez at [facility]    Seen and discussed with Dr. Mann.    Finn Navarrete MD  Rheumatology Fellow  [phone]    CC: Luana Martinez MD, [facility]          History of Present Illness:   From initial HPI: Christian Akers is a 87 year old male who presented for a second opinion of polymyalgia rheumatica vs giant cell arteritis and polyarticular inflammatory arthritis. Mr. Aekrs had his left knee replaced 4/25/16 and did well with rehab until Memorial Day when he developed bilateral shoulder and neck pain that got progressively worse. He also had stiffness in his hands, wrists, elbows, feet, ankles, and knees. He also had difficulty getting out of chairs, bed, and moving around. He was seen by several providers and the ED. He had been given baclofen, which did not help, and then NSAID's which also did not help. In June of 2016 he developed weakness and poor appetite and was noted to be febrile and tachycardic. He was evaluated in [place] and found to have a hemoglobin of 9.8 (around 2 grams lower than last prior) and a white count of 14.1. He was hospitalized and had an extensive work up including CT abdomen/pelvis, CTA, CT head, endoscopy, and colonoscopy. His endoscopy showed what was likely reactive gastropathy from the non-steroidals. CTA showed bibasilar infiltrates with small effusions and he was ultimately treated for pneumonia. He was discharged on a PPi and cessation of NSAID's. Because his arthritic symptoms did not saundra he was seen by Dr. Luana Martinez at [facility] 6/21/16 who felt his symptoms were most likely related to crystalline arthritis and he was given prednisone 10 mg per day and his right knee was aspirated. I do not have records of the reasoning behind this decision, but between  June and mid-July his prednisone was increased to 60 mg daily and then tapered down to 40 mg daily with complete resolution of his symptoms. It was noted that he had persistently low albumin (low 3'willian) but elevated inflammatory markers despite these high doses. He was placed on methotrexate with a working diagnosis of seronegative rheumatoid arthritis in mid-July and he was tapered down to 10-12.5 mg of prednisone daily which apparently resolved his symptoms. He was appropriately on calcium and vitamin D, Fosamax, and Bactrim prophylaxis during this time when his prednisone dose was high. In October his CRP was 43.7 on 12.5 mg of prednisone daily but he felt well and was tapered down to 10 mg daily. However in mid-November he began to experience fatigue but denied other GCA symptoms. His prednisone was increased back to 50 mg daily and he had a bilateral temporal artery biopsy which was reportedly negative. Another CT chest/abdomen/pelvis was negative for malignancy. He was noted to have a monoclonal proteinemia that was thought insignificant by heme/onc. CRP at his November visit when on 10 mg of prednisone daily was 96.7. His daughter also notes he had a urinary tract infection at some point in this interim, but I note several negative UA's in his paper chart throughout the entire fall. He was then referred to [facility] for a second opinion.    On his initial visit he was taking prednisone 40 mg daily and felt essentially normal. His prednisone was decreased to 20 mg daily and further tapered down to 10 mg in 2.5 mg/day increments every other week. He followed up with Dr. Martinez and Ms. Garcia of [facility] rheumatology who noted that his CRP was persistently elevated but he was feeling well. Given the discrepancy he was seen an an expedited basis here in the [facility].    Last seen: 11/12/17  Interim history:  Mr. Akers is doing OK. I asked him to come down for an appointment because I was getting several calls for  joint pains and the patient requiring prednisone. Lately his hips have been bothering him. He had an x-ray that shows DJD. He also saw an orthopaedist for left shoulder pain and had an injection. He also had his ankle injected since I saw him last. He is on methotrexate 6 tabs/week and his family put him back on prednisone 5 mg daily. Nothing is swollen at today's visit. His partner has been hospitalized in [place] for 2 weeks for what sounds like a subdural hematoma after a fall. It has been very hard on Victor M and he has lost some weight as he is not eating well and is stressed.         Review of Systems (other than HPI):   Constitutional: negative  Skin: negative  Eyes: negative  Ears/Nose/Throat: negative  Respiratory: negative  Cardiovascular: negative  Gastrointestinal: negative  Genitourinary: negative  Musculoskeletal: negative  Neurologic: negative  Psychiatric: negative  Hematologic/Lymphatic/Immunologic: negative  Endocrine: negative          Past Medical History:   HTN  HLD  BPH with negative prostate biopsies in   RBBB  Unprovoked PE 2015, treated with anticoagulation for 12 months  Seronegative inflammatory arthritis  OA  UTI in  with hemorrhagic cystitis in   Borderline glaucoma  Acute pseudogout of left knee 2015   Osteopenia    Left inguinal hernia repair  Cataract repair  Left TKA 2016  Bilateral temporal artery biopsies 2016-negative         Social History:   Former smoker, quit 40+ years ago  1 drink per week   with 5 children  Retired   Still very active with traveling, regular exercise, golf  Lives in [place] with his long-time female friend         Family History:   Father  of cancer at 83  Mother  at 83 with cancer  Brother  at 76 of prostate cancer  Sister  at age 54 of unknown cause, diagnosed with cancer  Daughter alive and well  Son alive and well  5 brothers, 2 sisters alive and healthy         Allergies:   Cialis-stomach  upset         Medications:     Current Outpatient Prescriptions   Medication Sig Dispense Refill     Acetaminophen (TYLENOL PO) Take 500 mg by mouth every 6 hours as needed for mild pain or fever       alendronate (FOSAMAX) 70 MG tablet Take 1 tablet (70 mg) by mouth once a week Takes every Sunday 12 tablet 3     AMLODIPINE BESYLATE PO Take 5 mg by mouth daily       ASPIRIN PO Take 81 mg by mouth daily       brimonidine (ALPHAGAN-P) 0.15 % ophthalmic solution Place 1 drop into both eyes daily       calcium-vitamin D (CALTRATE) 600-400 MG-UNIT per tablet Take 1 tablet by mouth 2 times daily       Cholecalciferol (VITAMIN D3 PO) Take 1,000 Units by mouth daily       FOLIC ACID PO Take 800 mcg by mouth daily       methotrexate 2.5 MG tablet CHEMO Take 8 tablets (20 mg) by mouth once a week After off prednisone, decrease by 1 tab/week every month until follow up. 32 tablet 2     OMEPRAZOLE PO Take 20 mg by mouth daily       predniSONE (DELTASONE) 1 MG tablet Take 2 tablets (2 mg) by mouth daily Decrease by 1 mg/day every month until off. 360 tablet 2     predniSONE (DELTASONE) 10 MG tablet 40 mg daily for 5 days, then 20 mg daily for 5 days then off 30 tablet 1     tamsulosin (FLOMAX) 0.4 MG capsule Take 0.8 mg by mouth daily            Physical Exam:   There were no vitals taken for this visit.  Wt Readings from Last 4 Encounters:   11/17/17 70.9 kg (156 lb 6.4 oz)   05/19/17 75.2 kg (165 lb 12.8 oz)   02/17/17 74.8 kg (165 lb)   12/30/16 72.5 kg (159 lb 14.4 oz)     Constitutional: well-developed, appearing stated age; cooperative  Eyes: normal EOM, PERRLA, vision, conjunctiva, sclera  ENT: normal external ears, nose, hearing, lips, teeth, gums, throat, no mucous membrane lesions, normal saliva pool  Neck: no mass or thyroid enlargement  Resp: lungs clear to auscultation  CV: RRR, no murmurs, rubs or gallops, no edema  GI: no abdominal mass or tenderness, no organomegaly   : not tested  Lymph: no cervical,  "supraclavicular, or epitrochlear nodes  MS: The TMJ, neck, shoulder, elbow, wrist, MCP/PIP/DIP, spine, hip, knee, ankle, and foot MTP/IP joints were examined. No active synovitis. Bilateral hips with limitation to flexion and internal rotation. Full joint ROM otherwise. Normal  strength. No dactylitis,  tenosynovitis, enthesopathy. Left knee arthrotomy scar. Prominence to right 5th metatarsal head.  Skin: no nail pitting, alopecia, rash, nodules or lesions  Neuro: normal cranial nerves, strength, sensation  Psych: normal judgement, orientation, memory, affect         Data:   Results for TORIN TORRES (MRN [id number]) as of 4/27/2018 09:29   Ref. Range 8/16/2017 09:01 11/17/2017 09:18 2/16/2018 10:26 3/8/2018 10:54   CRP Inflammation Latest Ref Range: 0.0 - 8.0 mg/L 24.1 (H) 8.7 (H) 39.4 (A) 50.7 (H)     Results for TORIN TORRES (MRN [id number]) as of 4/27/2018 09:29   Ref. Range 8/16/2017 09:01 11/17/2017 09:18 2/16/2018 10:26   Sed Rate Latest Ref Range: 0 20 mm/hr 16 13 28     Outside Data:  Hep B/C serology negative    Lyme, Anaplasma negative  RF negative  CAMPBELL negative  Babesia negative  Ehrlichia negative  Parvovirus PCR negative  EBV IgG positive, IgM negative  Monoscreen negative  Immunoglobulins with normal IgA/G, low IgM (22, 50 lower limit of normal)  PTH 14.4 (normal)  Ferritin 1137.3 6/19/16    Left knee aspiration 11/29/15:  2106 cells, 95% PMN's, intracellular CPPD    Right knee aspiration June 2016: 13,830 cells, 92% PMN's, no crystals seen   Cr 1.1-1.2  HGB 10-11  WBC's 14-15 with PMN predominance, lymphopenia    Outside Radiology:  Echocardiogram 6/16/16 for \"fever unexplained\"  The left ventricle is normal size.  The left ventricular systolic function is hyperdynamic.  There is mild concentric hypertrophy.  Left atrium is mildly enlarged by volume.  The aortic valve is mildly sclerotic.  Moderate mitral valve annular calcification.  Borderline mitral valve stenosis.  Estimated RV " systolic pressure is 43 mm Hg above right atrial pressure.  Small pericardial effusion.  There is no echocardiographic evidence of endocarditis.    CT abdomen/pelvis   1. No acute obstructive or inflammatory abdominopelvic process.  2. Moderate to severe colonic diverticulosis without diverticulitis. Mild constipation.  3. Mild cardiomegaly, aortic valve and mitral annular calcifications, better assessed by nonemergent cardiac echo. Also coronary arterial calcifications.  4. Moderate generalized osteopenia. Outpatient DEXA scan recommended.  5. Mild diffuse bladder mural thickening versus underdistention could be due to chronic bladder outlet obstruction due to prostatomegaly. Infectious cystitis felt to be less likely.     CTA:  1. Mild-to-moderate bilateral basilar infiltrates with small effusions.  2. In the major pulmonary arteries and first divisions there is no evidence for pulmonary emboli. The distal branches are difficult to see.    Colonoscopy:  Rectal exam was preformed and was Normal. The olympus videoendoscope was inserted into the anus and passed without difficulty to the cecum. TI normal. Cecum was identified by coelesence of the tinea, visualization of the appendiceal orifice and visualization of the ileocecal valve. The scope was slowly withdrawn and all walls of the colon were visualized. No polyps or masses or inflammation was seen. Sigmoid Diverticulosis was identified. Upon reaching the rectum the scope was retroverted and minimal internal hemorrhoids were identified. No abnormalities were noted.    Endoscopy:  The olympus scope was inserted via the oropharyx passed without difficulty via the esophagus and into the stomach. The scope was then advanced into the duodenum. Duodenum was normal. Duodenum biopsies were not obtained. The scope was slowly withdrawn the duodenum and stomach was examined. Forward and retroflexion views were performed. Stomach was had preplyoic gastritis. Gastric biopsies  "were obtained. A sliding hiatal hernia was identified. The GE junction was at 36 cm. GE junction had bowen's like changes. Gastro-esophageal biopsies were obtained. The scope was removed from the patient and esophagus examined. The esophagus was normal.    Endoscopic biopsies:  A) Specimen designated \"prepyloric\", biopsy:  Reactive gastropathy with focal erosion (see comment)  No Helicobacter pylori organisms identified on immunohistochemical  stain  B) GE junction, biopsy:  Mildly inflamed gastric cardiac type mucosa with scant squamous  epithelium  No goblet cell metaplasia identified      Shoulder x-ray: moderate severe left GH arthritis, moderate right AC OA with spurring    Bilateral hand x-ray: bilateral OA of DIP's and MCP's    Bilateral wrist x-rays in PACS: chondrocalcinosis of bilateral triangular cartilages    Results for orders placed or performed in visit on 03/08/18   CRP inflammation   Result Value Ref Range    CRP Inflammation 50.7 (H) 0.0 - 8.0 mg/L     Reviewed Rheumatology lab flowsheet    I saw the patient with the fellow.  My exam and recomendations are as described.      Andres Mann MD    "

## 2018-04-27 NOTE — LETTER
4/27/2018      RE: Christian Akers  [address]  [facility] [address]       Rheumatology Clinic Visit     Christian Akers MRN# [id number]   YOB: 1930 Age: 88 year old     Date of Visit: 4/27/2018    Primary care provider: Luana Martinez MD, [facility]  Referring provider: Luana Martinez MD, [facility]         Assessment and Plan:   #Polyarticular inflammatory arthritis-right knee aspiration 7/2016 with 13,000 WBC's, no crystals  #Chondrocalcinosis of wrists, history of acute pseudogout left knee in 2015, left knee aspiration with 2100 WBC's, intracellular CPP, history of crystalline-sounding left ankle monoarthritis with corticosteroid injection January of 2017  #History of neck/shoulder pain, myalgia, weakness, elevated inflammatory markers, bilateral temporal artery biopsy negative November of 2016 but after 4-5 months of high dose prednisone  #Osteopenia  #Long-term use of high doses of corticosteroids  #Monoclonal proteinemia  #History of unprovoked DVT/PE in 2015, treated with 12 months anticoagulation  #Degenerative joint disease of bilateral hips, moderate-severe    I suspect most of his present pains are from his hip OA for which there are not many good therapies apart from joint replacement which he is not ready for. I also suspect that he is having intermittent flares of pseudogout, particularly during these episodes when he needs his ankle injected. Low-dose prednisone will likely help both the symptoms of OA as well as for prophylaxis against pseudogout. He can use higher doses for flares. Will continue methotrexate for PMR/pseudogout.    -prednisone 5 mg daily  -continue methotrexate 6 tablets weekly, hold on further tapering at present time  -if inflammatory arthritis flares, prednisone 40 mg daily for 5 days  -calcium, vitamin D, and a bisphosphonate given his existing osteopenia  -repeat labs today and in 3 months  -if he is  having flares of mono or oligoarticular arthritis I would be very suspicious of CPPD, could consider addition of Plaquenil or colchicine  -follow up in 6 months with Dr. Goyal or in [facility] fellows clinic vs following up closer with Dr. Martinez at [facility]    Seen and discussed with Dr. Mann.    Finn Navarrete MD  Rheumatology Fellow  [phone]    CC: Luana Martinez MD, [facility]          History of Present Illness:   From initial HPI: Christian Akers is a 87 year old male who presented for a second opinion of polymyalgia rheumatica vs giant cell arteritis and polyarticular inflammatory arthritis. Mr. Akers had his left knee replaced 4/25/16 and did well with rehab until Memorial Day when he developed bilateral shoulder and neck pain that got progressively worse. He also had stiffness in his hands, wrists, elbows, feet, ankles, and knees. He also had difficulty getting out of chairs, bed, and moving around. He was seen by several providers and the ED. He had been given baclofen, which did not help, and then NSAID's which also did not help. In June of 2016 he developed weakness and poor appetite and was noted to be febrile and tachycardic. He was evaluated in [place] and found to have a hemoglobin of 9.8 (around 2 grams lower than last prior) and a white count of 14.1. He was hospitalized and had an extensive work up including CT abdomen/pelvis, CTA, CT head, endoscopy, and colonoscopy. His endoscopy showed what was likely reactive gastropathy from the non-steroidals. CTA showed bibasilar infiltrates with small effusions and he was ultimately treated for pneumonia. He was discharged on a PPi and cessation of NSAID's. Because his arthritic symptoms did not saundra he was seen by Dr. Luana Martinez at [facility] 6/21/16 who felt his symptoms were most likely related to crystalline arthritis and he was given prednisone 10 mg per day and his right knee was  aspirated. I do not have records of the reasoning behind this decision, but between June and mid-July his prednisone was increased to 60 mg daily and then tapered down to 40 mg daily with complete resolution of his symptoms. It was noted that he had persistently low albumin (low 3'willian) but elevated inflammatory markers despite these high doses. He was placed on methotrexate with a working diagnosis of seronegative rheumatoid arthritis in mid-July and he was tapered down to 10-12.5 mg of prednisone daily which apparently resolved his symptoms. He was appropriately on calcium and vitamin D, Fosamax, and Bactrim prophylaxis during this time when his prednisone dose was high. In October his CRP was 43.7 on 12.5 mg of prednisone daily but he felt well and was tapered down to 10 mg daily. However in mid-November he began to experience fatigue but denied other GCA symptoms. His prednisone was increased back to 50 mg daily and he had a bilateral temporal artery biopsy which was reportedly negative. Another CT chest/abdomen/pelvis was negative for malignancy. He was noted to have a monoclonal proteinemia that was thought insignificant by heme/onc. CRP at his November visit when on 10 mg of prednisone daily was 96.7. His daughter also notes he had a urinary tract infection at some point in this interim, but I note several negative UA's in his paper chart throughout the entire fall. He was then referred to [facility] for a second opinion.    On his initial visit he was taking prednisone 40 mg daily and felt essentially normal. His prednisone was decreased to 20 mg daily and further tapered down to 10 mg in 2.5 mg/day increments every other week. He followed up with Dr. Martinez and Ms. Garcia of [facility] rheumatology who noted that his CRP was persistently elevated but he was feeling well. Given the discrepancy he was seen an an expedited basis here in the [facility].    Last seen: 11/12/17  Interim history:  Mr. Akers is doing OK.  I asked him to come down for an appointment because I was getting several calls for joint pains and the patient requiring prednisone. Lately his hips have been bothering him. He had an x-ray that shows DJD. He also saw an orthopaedist for left shoulder pain and had an injection. He also had his ankle injected since I saw him last. He is on methotrexate 6 tabs/week and his family put him back on prednisone 5 mg daily. Nothing is swollen at today's visit. His partner has been hospitalized in [place] for 2 weeks for what sounds like a subdural hematoma after a fall. It has been very hard on Victor M and he has lost some weight as he is not eating well and is stressed.         Review of Systems (other than HPI):   Constitutional: negative  Skin: negative  Eyes: negative  Ears/Nose/Throat: negative  Respiratory: negative  Cardiovascular: negative  Gastrointestinal: negative  Genitourinary: negative  Musculoskeletal: negative  Neurologic: negative  Psychiatric: negative  Hematologic/Lymphatic/Immunologic: negative  Endocrine: negative          Past Medical History:   HTN  HLD  BPH with negative prostate biopsies in   RBBB  Unprovoked PE 2015, treated with anticoagulation for 12 months  Seronegative inflammatory arthritis  OA  UTI in  with hemorrhagic cystitis in   Borderline glaucoma  Acute pseudogout of left knee 2015   Osteopenia    Left inguinal hernia repair  Cataract repair  Left TKA 2016  Bilateral temporal artery biopsies 2016-negative         Social History:   Former smoker, quit 40+ years ago  1 drink per week   with 5 children  Retired   Still very active with traveling, regular exercise, golf  Lives in [place] with his long-time female friend         Family History:   Father  of cancer at 83  Mother  at 83 with cancer  Brother  at 76 of prostate cancer  Sister  at age 54 of unknown cause, diagnosed with cancer  Daughter alive and well  Son alive and  well  5 brothers, 2 sisters alive and healthy         Allergies:   Cialis-stomach upset         Medications:     Current Outpatient Prescriptions   Medication Sig Dispense Refill     Acetaminophen (TYLENOL PO) Take 500 mg by mouth every 6 hours as needed for mild pain or fever       alendronate (FOSAMAX) 70 MG tablet Take 1 tablet (70 mg) by mouth once a week Takes every Sunday 12 tablet 3     AMLODIPINE BESYLATE PO Take 5 mg by mouth daily       ASPIRIN PO Take 81 mg by mouth daily       brimonidine (ALPHAGAN-P) 0.15 % ophthalmic solution Place 1 drop into both eyes daily       calcium-vitamin D (CALTRATE) 600-400 MG-UNIT per tablet Take 1 tablet by mouth 2 times daily       Cholecalciferol (VITAMIN D3 PO) Take 1,000 Units by mouth daily       FOLIC ACID PO Take 800 mcg by mouth daily       methotrexate 2.5 MG tablet CHEMO Take 8 tablets (20 mg) by mouth once a week After off prednisone, decrease by 1 tab/week every month until follow up. 32 tablet 2     OMEPRAZOLE PO Take 20 mg by mouth daily       predniSONE (DELTASONE) 1 MG tablet Take 2 tablets (2 mg) by mouth daily Decrease by 1 mg/day every month until off. 360 tablet 2     predniSONE (DELTASONE) 10 MG tablet 40 mg daily for 5 days, then 20 mg daily for 5 days then off 30 tablet 1     tamsulosin (FLOMAX) 0.4 MG capsule Take 0.8 mg by mouth daily            Physical Exam:   There were no vitals taken for this visit.  Wt Readings from Last 4 Encounters:   11/17/17 70.9 kg (156 lb 6.4 oz)   05/19/17 75.2 kg (165 lb 12.8 oz)   02/17/17 74.8 kg (165 lb)   12/30/16 72.5 kg (159 lb 14.4 oz)     Constitutional: well-developed, appearing stated age; cooperative  Eyes: normal EOM, PERRLA, vision, conjunctiva, sclera  ENT: normal external ears, nose, hearing, lips, teeth, gums, throat, no mucous membrane lesions, normal saliva pool  Neck: no mass or thyroid enlargement  Resp: lungs clear to auscultation  CV: RRR, no murmurs, rubs or gallops, no edema  GI: no abdominal  "mass or tenderness, no organomegaly   : not tested  Lymph: no cervical, supraclavicular, or epitrochlear nodes  MS: The TMJ, neck, shoulder, elbow, wrist, MCP/PIP/DIP, spine, hip, knee, ankle, and foot MTP/IP joints were examined. No active synovitis. Bilateral hips with limitation to flexion and internal rotation. Full joint ROM otherwise. Normal  strength. No dactylitis,  tenosynovitis, enthesopathy. Left knee arthrotomy scar. Prominence to right 5th metatarsal head.  Skin: no nail pitting, alopecia, rash, nodules or lesions  Neuro: normal cranial nerves, strength, sensation  Psych: normal judgement, orientation, memory, affect         Data:   Results for TORIN TORRES (MRN [id number]) as of 4/27/2018 09:29   Ref. Range 8/16/2017 09:01 11/17/2017 09:18 2/16/2018 10:26 3/8/2018 10:54   CRP Inflammation Latest Ref Range: 0.0 - 8.0 mg/L 24.1 (H) 8.7 (H) 39.4 (A) 50.7 (H)     Results for TORIN TORRES (MRN [id number]) as of 4/27/2018 09:29   Ref. Range 8/16/2017 09:01 11/17/2017 09:18 2/16/2018 10:26   Sed Rate Latest Ref Range: 0 20 mm/hr 16 13 28     Outside Data:  Hep B/C serology negative    Lyme, Anaplasma negative  RF negative  CAMPBELL negative  Babesia negative  Ehrlichia negative  Parvovirus PCR negative  EBV IgG positive, IgM negative  Monoscreen negative  Immunoglobulins with normal IgA/G, low IgM (22, 50 lower limit of normal)  PTH 14.4 (normal)  Ferritin 1137.3 6/19/16    Left knee aspiration 11/29/15:  2106 cells, 95% PMN's, intracellular CPPD    Right knee aspiration June 2016: 13,830 cells, 92% PMN's, no crystals seen   Cr 1.1-1.2  HGB 10-11  WBC's 14-15 with PMN predominance, lymphopenia    Outside Radiology:  Echocardiogram 6/16/16 for \"fever unexplained\"  The left ventricle is normal size.  The left ventricular systolic function is hyperdynamic.  There is mild concentric hypertrophy.  Left atrium is mildly enlarged by volume.  The aortic valve is mildly sclerotic.  Moderate mitral valve " annular calcification.  Borderline mitral valve stenosis.  Estimated RV systolic pressure is 43 mm Hg above right atrial pressure.  Small pericardial effusion.  There is no echocardiographic evidence of endocarditis.    CT abdomen/pelvis   1. No acute obstructive or inflammatory abdominopelvic process.  2. Moderate to severe colonic diverticulosis without diverticulitis. Mild constipation.  3. Mild cardiomegaly, aortic valve and mitral annular calcifications, better assessed by nonemergent cardiac echo. Also coronary arterial calcifications.  4. Moderate generalized osteopenia. Outpatient DEXA scan recommended.  5. Mild diffuse bladder mural thickening versus underdistention could be due to chronic bladder outlet obstruction due to prostatomegaly. Infectious cystitis felt to be less likely.     CTA:  1. Mild-to-moderate bilateral basilar infiltrates with small effusions.  2. In the major pulmonary arteries and first divisions there is no evidence for pulmonary emboli. The distal branches are difficult to see.    Colonoscopy:  Rectal exam was preformed and was Normal. The olympus videoendoscope was inserted into the anus and passed without difficulty to the cecum. TI normal. Cecum was identified by coelesence of the tinea, visualization of the appendiceal orifice and visualization of the ileocecal valve. The scope was slowly withdrawn and all walls of the colon were visualized. No polyps or masses or inflammation was seen. Sigmoid Diverticulosis was identified. Upon reaching the rectum the scope was retroverted and minimal internal hemorrhoids were identified. No abnormalities were noted.    Endoscopy:  The olympus scope was inserted via the oropharyx passed without difficulty via the esophagus and into the stomach. The scope was then advanced into the duodenum. Duodenum was normal. Duodenum biopsies were not obtained. The scope was slowly withdrawn the duodenum and stomach was examined. Forward and retroflexion  "views were performed. Stomach was had preplyoic gastritis. Gastric biopsies were obtained. A sliding hiatal hernia was identified. The GE junction was at 36 cm. GE junction had bowen's like changes. Gastro-esophageal biopsies were obtained. The scope was removed from the patient and esophagus examined. The esophagus was normal.    Endoscopic biopsies:  A) Specimen designated \"prepyloric\", biopsy:  Reactive gastropathy with focal erosion (see comment)  No Helicobacter pylori organisms identified on immunohistochemical  stain  B) GE junction, biopsy:  Mildly inflamed gastric cardiac type mucosa with scant squamous  epithelium  No goblet cell metaplasia identified      Shoulder x-ray: moderate severe left GH arthritis, moderate right AC OA with spurring    Bilateral hand x-ray: bilateral OA of DIP's and MCP's    Bilateral wrist x-rays in PACS: chondrocalcinosis of bilateral triangular cartilages    Results for orders placed or performed in visit on 03/08/18   CRP inflammation   Result Value Ref Range    CRP Inflammation 50.7 (H) 0.0 - 8.0 mg/L     Reviewed Rheumatology lab flowsheet    I saw the patient with the fellow.  My exam and recomendations are as described.      MD Finn Connelly MD, MD      "

## 2018-04-27 NOTE — PATIENT INSTRUCTIONS
Stay at 6 tablets/week of methotrexate.    Stay on prednisone 5 mg daily.    For flares of inflammation in joints: prednisone 40 mg daily for 5 days.    I asked Sunil to inquire about family members discussing medical issues with our staff.    Labs today and in 3 months.    Follow up with Dr. Goyal in 6 months

## 2018-04-27 NOTE — NURSING NOTE
"Chief Complaint   Patient presents with     RECHECK     RA       Initial /78  Pulse 82  Temp 98.1  F (36.7  C) (Oral)  Ht 1.715 m (5' 7.5\")  Wt 67.9 kg (149 lb 11.2 oz)  SpO2 98%  BMI 23.1 kg/m2 Estimated body mass index is 23.1 kg/(m^2) as calculated from the following:    Height as of this encounter: 1.715 m (5' 7.5\").    Weight as of this encounter: 67.9 kg (149 lb 11.2 oz).  Medication Reconciliation: complete   Phillip Fernandez MA    "

## 2018-05-30 DIAGNOSIS — M72.2 PLANTAR FASCIITIS: ICD-10-CM

## 2018-05-30 DIAGNOSIS — Z87.39 HISTORY OF CALCIUM PYROPHOSPHATE DEPOSITION DISEASE (CPPD): ICD-10-CM

## 2018-05-30 DIAGNOSIS — Z87.39 HX OF CALCIUM PYROPHOSPHATE DEPOSITION DISEASE (CPPD): ICD-10-CM

## 2018-05-30 DIAGNOSIS — M35.3 PMR (POLYMYALGIA RHEUMATICA) (H): ICD-10-CM

## 2018-05-30 DIAGNOSIS — Z51.81 ENCOUNTER FOR THERAPEUTIC DRUG MONITORING: ICD-10-CM

## 2018-05-30 DIAGNOSIS — D64.9 ANEMIA, UNSPECIFIED TYPE: ICD-10-CM

## 2018-08-13 ENCOUNTER — TELEPHONE (OUTPATIENT)
Dept: RHEUMATOLOGY | Facility: CLINIC | Age: 83
End: 2018-08-13

## 2018-08-13 DIAGNOSIS — D64.9 ANEMIA: ICD-10-CM

## 2018-08-13 DIAGNOSIS — Z87.39 HX OF CALCIUM PYROPHOSPHATE DEPOSITION DISEASE (CPPD): Primary | ICD-10-CM

## 2018-08-13 DIAGNOSIS — M72.2 PLANTAR FASCIITIS: ICD-10-CM

## 2018-08-13 DIAGNOSIS — M35.3 POLYMYALGIA RHEUMATICA (H): ICD-10-CM

## 2018-08-13 DIAGNOSIS — D64.9 ANEMIA, UNSPECIFIED TYPE: ICD-10-CM

## 2018-08-13 DIAGNOSIS — M35.3 PMR (POLYMYALGIA RHEUMATICA) (H): ICD-10-CM

## 2018-08-13 DIAGNOSIS — Z87.39 HISTORY OF CALCIUM PYROPHOSPHATE DEPOSITION DISEASE (CPPD): ICD-10-CM

## 2018-08-13 DIAGNOSIS — Z51.81 ENCOUNTER FOR THERAPEUTIC DRUG MONITORING: ICD-10-CM

## 2018-08-13 DIAGNOSIS — Z87.39 HX OF CALCIUM PYROPHOSPHATE DEPOSITION DISEASE (CPPD): ICD-10-CM

## 2018-08-13 LAB
BASOPHILS # BLD AUTO: 0 10E9/L (ref 0–0.2)
BASOPHILS NFR BLD AUTO: 0.4 %
DIFFERENTIAL METHOD BLD: ABNORMAL
EOSINOPHIL # BLD AUTO: 0.1 10E9/L (ref 0–0.7)
EOSINOPHIL NFR BLD AUTO: 0.7 %
ERYTHROCYTE [DISTWIDTH] IN BLOOD BY AUTOMATED COUNT: 17.8 % (ref 10–15)
ERYTHROCYTE [SEDIMENTATION RATE] IN BLOOD BY WESTERGREN METHOD: 11 MM/H (ref 0–20)
HCT VFR BLD AUTO: 39.8 % (ref 40–53)
HGB BLD-MCNC: 13.2 G/DL (ref 13.3–17.7)
LYMPHOCYTES # BLD AUTO: 0.8 10E9/L (ref 0.8–5.3)
LYMPHOCYTES NFR BLD AUTO: 7.8 %
MCH RBC QN AUTO: 29.9 PG (ref 26.5–33)
MCHC RBC AUTO-ENTMCNC: 33.2 G/DL (ref 31.5–36.5)
MCV RBC AUTO: 90 FL (ref 78–100)
MONOCYTES # BLD AUTO: 0.6 10E9/L (ref 0–1.3)
MONOCYTES NFR BLD AUTO: 6.4 %
NEUTROPHILS # BLD AUTO: 8.2 10E9/L (ref 1.6–8.3)
NEUTROPHILS NFR BLD AUTO: 84.7 %
PLATELET # BLD AUTO: 251 10E9/L (ref 150–450)
RBC # BLD AUTO: 4.42 10E12/L (ref 4.4–5.9)
WBC # BLD AUTO: 9.7 10E9/L (ref 4–11)

## 2018-08-13 PROCEDURE — 84460 ALANINE AMINO (ALT) (SGPT): CPT | Mod: ZL | Performed by: STUDENT IN AN ORGANIZED HEALTH CARE EDUCATION/TRAINING PROGRAM

## 2018-08-13 PROCEDURE — 86140 C-REACTIVE PROTEIN: CPT | Mod: ZL | Performed by: STUDENT IN AN ORGANIZED HEALTH CARE EDUCATION/TRAINING PROGRAM

## 2018-08-13 PROCEDURE — 82247 BILIRUBIN TOTAL: CPT | Mod: ZL | Performed by: STUDENT IN AN ORGANIZED HEALTH CARE EDUCATION/TRAINING PROGRAM

## 2018-08-13 PROCEDURE — 84450 TRANSFERASE (AST) (SGOT): CPT | Mod: ZL | Performed by: STUDENT IN AN ORGANIZED HEALTH CARE EDUCATION/TRAINING PROGRAM

## 2018-08-13 PROCEDURE — 85652 RBC SED RATE AUTOMATED: CPT | Mod: ZL | Performed by: STUDENT IN AN ORGANIZED HEALTH CARE EDUCATION/TRAINING PROGRAM

## 2018-08-13 PROCEDURE — 85025 COMPLETE CBC W/AUTO DIFF WBC: CPT | Mod: ZL | Performed by: STUDENT IN AN ORGANIZED HEALTH CARE EDUCATION/TRAINING PROGRAM

## 2018-08-13 PROCEDURE — 82040 ASSAY OF SERUM ALBUMIN: CPT | Mod: ZL | Performed by: STUDENT IN AN ORGANIZED HEALTH CARE EDUCATION/TRAINING PROGRAM

## 2018-08-13 PROCEDURE — 36415 COLL VENOUS BLD VENIPUNCTURE: CPT | Mod: ZL | Performed by: STUDENT IN AN ORGANIZED HEALTH CARE EDUCATION/TRAINING PROGRAM

## 2018-08-13 PROCEDURE — 80048 BASIC METABOLIC PNL TOTAL CA: CPT | Mod: ZL | Performed by: STUDENT IN AN ORGANIZED HEALTH CARE EDUCATION/TRAINING PROGRAM

## 2018-08-13 PROCEDURE — 84075 ASSAY ALKALINE PHOSPHATASE: CPT | Mod: ZL | Performed by: STUDENT IN AN ORGANIZED HEALTH CARE EDUCATION/TRAINING PROGRAM

## 2018-08-13 NOTE — LETTER
Patient:  Christian Akers  :   1930  MRN:     [id number]        Mr.Anthony Akers  [address] [facility] [address]        2018    Dear ,    We are writing to inform you of your test results.      Resulted Orders   Alkaline phosphatase   Result Value Ref Range    Alkaline Phosphatase 77 40 - 150 U/L   ALT   Result Value Ref Range    ALT 23 0 - 70 U/L   AST   Result Value Ref Range    AST 21 0 - 45 U/L   Basic metabolic panel   Result Value Ref Range    Sodium 139 133 - 144 mmol/L    Potassium 4.1 3.4 - 5.3 mmol/L    Chloride 104 94 - 109 mmol/L    Carbon Dioxide 25 20 - 32 mmol/L    Anion Gap 10 3 - 14 mmol/L    Glucose 104 (H) 70 - 99 mg/dL      Comment:      Fasting specimen    Urea Nitrogen 17 7 - 30 mg/dL    Creatinine 1.00 0.66 - 1.25 mg/dL    GFR Estimate 71 >60 mL/min/1.7m2      Comment:      Non  GFR Calc    GFR Estimate If Black 86 >60 mL/min/1.7m2      Comment:       GFR Calc    Calcium 8.8 8.5 - 10.1 mg/dL   Bilirubin  total   Result Value Ref Range    Bilirubin Total 0.4 0.2 - 1.3 mg/dL   CBC with platelets differential   Result Value Ref Range    WBC 9.7 4.0 - 11.0 10e9/L    RBC Count 4.42 4.4 - 5.9 10e12/L    Hemoglobin 13.2 (L) 13.3 - 17.7 g/dL    Hematocrit 39.8 (L) 40.0 - 53.0 %    MCV 90 78 - 100 fl    MCH 29.9 26.5 - 33.0 pg    MCHC 33.2 31.5 - 36.5 g/dL    RDW 17.8 (H) 10.0 - 15.0 %    Platelet Count 251 150 - 450 10e9/L    Diff Method Automated Method     % Neutrophils 84.7 %    % Lymphocytes 7.8 %    % Monocytes 6.4 %    % Eosinophils 0.7 %    % Basophils 0.4 %    Absolute Neutrophil 8.2 1.6 - 8.3 10e9/L    Absolute Lymphocytes 0.8 0.8 - 5.3 10e9/L    Absolute Monocytes 0.6 0.0 - 1.3 10e9/L    Absolute Eosinophils 0.1 0.0 - 0.7 10e9/L    Absolute Basophils 0.0 0.0 - 0.2 10e9/L   Albumin level   Result Value Ref Range    Albumin 3.8 3.4 - 5.0 g/dL   Erythrocyte sedimentation rate auto   Result Value Ref Range    Sed Rate 11 0 - 20  mm/h   CRP inflammation   Result Value Ref Range    CRP Inflammation 15.0 (H) 0.0 - 8.0 mg/L     Blood counts are normal. Kidney function is normal. Liver function is normal. Short-term nflammation is mildly elevated. No change is medication is recommended.  Please let me know if you have questions.    Sincerely,    Tee Goyal MD  Rheumatologist, [facility]     [phone]  2/16/1930

## 2018-08-13 NOTE — TELEPHONE ENCOUNTER
[facility] Call Center    Phone Message    May a detailed message be left on voicemail: yes    Reason for Call: Order(s): Other:   Reason for requested: Lab work  Date needed: 08/13/2018  Provider name: Dr. Goyal      Action Taken: Message routed to:  [facility] ([facility]

## 2018-08-14 LAB
ALBUMIN SERPL-MCNC: 3.8 G/DL (ref 3.4–5)
ALP SERPL-CCNC: 77 U/L (ref 40–150)
ALT SERPL W P-5'-P-CCNC: 23 U/L (ref 0–70)
ANION GAP SERPL CALCULATED.3IONS-SCNC: 10 MMOL/L (ref 3–14)
AST SERPL W P-5'-P-CCNC: 21 U/L (ref 0–45)
BILIRUB SERPL-MCNC: 0.4 MG/DL (ref 0.2–1.3)
BUN SERPL-MCNC: 17 MG/DL (ref 7–30)
CALCIUM SERPL-MCNC: 8.8 MG/DL (ref 8.5–10.1)
CHLORIDE SERPL-SCNC: 104 MMOL/L (ref 94–109)
CO2 SERPL-SCNC: 25 MMOL/L (ref 20–32)
CREAT SERPL-MCNC: 1 MG/DL (ref 0.66–1.25)
CRP SERPL-MCNC: 15 MG/L (ref 0–8)
GFR SERPL CREATININE-BSD FRML MDRD: 71 ML/MIN/1.7M2
GLUCOSE SERPL-MCNC: 104 MG/DL (ref 70–99)
POTASSIUM SERPL-SCNC: 4.1 MMOL/L (ref 3.4–5.3)
SODIUM SERPL-SCNC: 139 MMOL/L (ref 133–144)

## 2018-10-04 DIAGNOSIS — Z51.81 ENCOUNTER FOR THERAPEUTIC DRUG MONITORING: ICD-10-CM

## 2018-10-04 DIAGNOSIS — M35.3 PMR (POLYMYALGIA RHEUMATICA) (H): ICD-10-CM

## 2018-10-04 DIAGNOSIS — M72.2 PLANTAR FASCIITIS: ICD-10-CM

## 2018-10-04 DIAGNOSIS — Z87.39 HISTORY OF CALCIUM PYROPHOSPHATE DEPOSITION DISEASE (CPPD): ICD-10-CM

## 2018-10-04 DIAGNOSIS — D64.9 ANEMIA, UNSPECIFIED TYPE: ICD-10-CM

## 2018-10-04 DIAGNOSIS — Z87.39 HX OF CALCIUM PYROPHOSPHATE DEPOSITION DISEASE (CPPD): ICD-10-CM

## 2018-10-04 NOTE — TELEPHONE ENCOUNTER
methotrexate 2.5 MG tablet CHEMO  Last Written Prescription Date:  4/27/18  Last Fill Quantity: 24,   # refills: 2  Last Office Visit: 4/27/18  Future Office visit: 10/26/18    CBC RESULTS:   Recent Labs   Lab Test  08/13/18   1240   WBC  9.7   RBC  4.42   HGB  13.2*   HCT  39.8*   MCV  90   MCH  29.9   MCHC  33.2   RDW  17.8*   PLT  251       Creatinine   Date Value Ref Range Status   08/13/2018 1.00 0.66 - 1.25 mg/dL Final   ]    Liver Function Studies -   Recent Labs   Lab Test  08/13/18   1240  04/27/18   0953   PROTTOTAL   --   7.3   ALBUMIN  3.8  3.4   BILITOTAL  0.4  0.3   ALKPHOS  77  83   AST  21  20   ALT  23  28     Lab Results   Component Value Date    ALBUMIN 3.8 08/13/2018       Routing refill request to provider for review/approval because: protocol.  Rosalba tolliver.

## 2018-10-16 DIAGNOSIS — Z51.81 ENCOUNTER FOR THERAPEUTIC DRUG MONITORING: ICD-10-CM

## 2018-10-16 DIAGNOSIS — Z87.39 HISTORY OF CALCIUM PYROPHOSPHATE DEPOSITION DISEASE (CPPD): ICD-10-CM

## 2018-10-16 DIAGNOSIS — Z87.39 HX OF CALCIUM PYROPHOSPHATE DEPOSITION DISEASE (CPPD): ICD-10-CM

## 2018-10-16 DIAGNOSIS — M35.3 PMR (POLYMYALGIA RHEUMATICA) (H): ICD-10-CM

## 2018-10-16 DIAGNOSIS — M72.2 PLANTAR FASCIITIS: ICD-10-CM

## 2018-10-16 DIAGNOSIS — D64.9 ANEMIA, UNSPECIFIED TYPE: ICD-10-CM

## 2018-10-17 RX ORDER — PREDNISONE 5 MG/1
5 TABLET ORAL DAILY
Qty: 90 TABLET | Refills: 0 | Status: SHIPPED | OUTPATIENT
Start: 2018-10-17 | End: 2018-10-26

## 2018-10-22 NOTE — PROGRESS NOTES
[facility]  Rheumatology Clinic  Tee Goyal MD  10/26/2018     Name: Christian Akers  MRN: [id number]  Age: 88 year old  : 1930  Referring provider: Luana Martinez    Patient's daughter and son were present for the entire visit.    Assessment and Plan:    #Polymyalgia Rheumatica  History of neck/shoulder pain, myalgia, weakness, elevated inflammatory markers. Bilateral temporal artery biopsy negative in 2016, but after 4-5 months of high dose prednisone. Patient currently on 6mg prednisone daily and 15 mg methotrexate weekly. Fatigue is present but manageable. No symptoms to suggest GCA.    #Calcium Pyrophosphate Deposition Disease  Chondrocalcinosis of wrists. History of acute pseudogout left knee in , left knee aspiration with 2100 WBC's, intracellular CPP. History of crystalline-sounding left ankle monoarthritis with corticosteroid injection 2017. Has not required larger doses of steroids for flares since last visit.    #DJD of bilateral hips, moderate-severe  #DJD of left shoulder  Patient's pain is well controlled at present with OTC tylenol and home physical therapy exercises.     #Polyarticular inflammatory arthritis-right knee aspiration 2016 with 13,000 WBC's, no crystals  No flares of inflammatory arthritis since last visit.    #Osteopenia  Last DEXA reportedly done in Summer 2018 however this is not in our system. On Fosamax 70mg weekly, plus Ca and Vitamin D.    #Long-term use of high doses of corticosteroids    #Monoclonal proteinemia    #History of unprovoked DVT/PE in , treated with 12 months anticoagulation    Plan  - increase methotrexate to 7 tabs per week  - decrease prednisone to 5mg daily [id number], then decrease to 4mg daily  - limit tylenol to 1000mg max dose, 2-3x day for pain  - continue alendronate 70mg/wk for treatment of osteopenia  - continue home physical therapy and encourage walking for weight bearing exercise  - recheck inflammatory  markers, CBC, LFT's, Creatinine  - follow up in 6 months    Follow-up: Return in about 6 months (around 4/26/2019).     HPI:   Christian Akers has a history of polyarticular inflammatory arthritis, pseudogout, and osteoarthritis and presents for follow up. He was last seen on 4/27/18 by Dr. Navarrete, at which time the plan was to continue prednisone 5mg daily (40mg daily for 5 days in the event of a flare), methotrexate 6 tablets weekly, and calcium, vitamin D, and a bisphosphonate for his osteopenia. If he were to have flares of mono or oligoarticular arthritis, this would be very suspicious of CPPD, and we may consider addition of plaquenil or colchicine.    Today, the patient reports feeling well but notes that his left shoulder bothers him. He has morning stiffness in his shoulder that takes around half an hour to resolve. He mentions taking tylenol arthritis, two pills (reportedly 650mg/pill) twice daily. His biggest complaint is fatigue, which was moderately improved in July 2018 when his daughter increased his prednisone to 6mg daily. He has not had any joint pains or flares of joint symptoms since July. He does PT exercises at home and denies having pain in his hips or knees. He has not had any intraarticular steroid injections since last visit. He mentions taking alendronate 70mg/week for osteopenia (DEXA scan reportedly done 6 months ago at [place]).    Patient denies any fevers, chills, weight changes, headache, eye pain, vision changes, jaw claudication, or GI symptoms.    Interval History 4/27/18:  Patient reported that his hips were bothering him. He had an x-ray that showed DJD. He had injections for left shoulder pain and ankle pain. He was on methotrexate 6 tablets weekly and prednisone 5mg daily. His partner was hospitalized for 2 weeks for what sounded like a subdural hematoma after a fall, and he had lost some weight due to stress and not eating well because of that.    Review of Systems:    Pertinent items are noted in HPI or as below, remainder of complete ROS is negative.      No recent problems with hearing or vision. No swallowing problems.    No breathing difficulty, shortness of breath, coughing, or wheezing  No chest pain or palpitations  No heart burn, indigestion, abdominal pain, nausea, vomiting, diarrhea  No urination problems, no bloody, cloudy urine, no dysuria  No numbing, tingling, weakness  No headaches or confusion  No rashes. No easy bleeding or bruising.   +fatigue    Active Medications:     Current Outpatient Prescriptions:      Acetaminophen (TYLENOL PO), Take 500 mg by mouth every 6 hours as needed for mild pain or fever, Disp: , Rfl:      alendronate (FOSAMAX) 70 MG tablet, Take 1 tablet (70 mg) by mouth once a week Takes every Sunday, Disp: 12 tablet, Rfl: 3     AMLODIPINE BESYLATE PO, Take 5 mg by mouth daily, Disp: , Rfl:      ASPIRIN PO, Take 81 mg by mouth daily, Disp: , Rfl:      brimonidine (ALPHAGAN-P) 0.15 % ophthalmic solution, Place 1 drop into both eyes 2 times daily , Disp: , Rfl:      calcium-vitamin D (CALTRATE) 600-400 MG-UNIT per tablet, Take 1 tablet by mouth 2 times daily, Disp: 60 tablet, Rfl: 11     cholecalciferol (VITAMIN D3) 1000 UNIT tablet, Take 1 tablet (1,000 Units) by mouth daily, Disp: 30 tablet, Rfl: 11     FOLIC ACID PO, Take 800 mcg by mouth daily, Disp: , Rfl:      methotrexate 2.5 MG tablet CHEMO, Take 7 tablets (17.5 mg) by mouth once a week, Disp: 84 tablet, Rfl: 1     OMEPRAZOLE PO, Take 20 mg by mouth daily, Disp: , Rfl:      predniSONE (DELTASONE) 1 MG tablet, Take 4 tabs daily while tapering prednisone per Doctor instructions, Disp: 200 tablet, Rfl: 2     predniSONE (DELTASONE) 10 MG tablet, 40 mg daily for 5 days, then 20 mg daily for 5 days then off (Patient taking differently: 6 mg 40 mg daily for 5 days, then 20 mg daily for 5 days then off), Disp: 30 tablet, Rfl: 1     predniSONE (DELTASONE) 5 MG tablet, Take 1 tablet (5 mg) by  "mouth daily, Disp: 90 tablet, Rfl: 1     tamsulosin (FLOMAX) 0.4 MG capsule, Take 0.8 mg by mouth daily, Disp: , Rfl:      [DISCONTINUED] alendronate (FOSAMAX) 70 MG tablet, Take 1 tablet (70 mg) by mouth once a week Takes every Sunday, Disp: 12 tablet, Rfl: 3     [DISCONTINUED] methotrexate 2.5 MG tablet CHEMO, Take 6 tablets (15 mg) by mouth once a week, Disp: 72 tablet, Rfl: 0      Allergies:   Cialis  [tadalafil]      Past Medical History:  Anemia  HTN  HLD  BPH with negative prostate biopsies in 2005  RBBB  Unprovoked PE 2/2015, treated with anticoagulation for 12 months  Seronegative inflammatory arthritis  OA  UTI in 2004 with hemorrhagic cystitis in 2008  Borderline glaucoma  Acute pseudogout of left knee 11/2015   Osteopenia     Past Surgical History:  Left inguinal hernia repair  Cataract repair  Left TKA 4/2016  Bilateral temporal artery biopsies 11/2016-negative    Family History:   Father - cancer  Mother - cancer  Brother - prostate cancer  Sister - cancer     Social History:   Former smoker  No smokeless tobacco use  Occasional alcohol use, 1 drink/week     Physical Exam:   /70  Pulse 79  Temp 98.5  F (36.9  C) (Oral)  Ht 1.715 m (5' 7.5\")  Wt 73.1 kg (161 lb 1.6 oz)  SpO2 96%  BMI 24.86 kg/m2   Wt Readings from Last 4 Encounters:   10/26/18 73.1 kg (161 lb 1.6 oz)   04/27/18 67.9 kg (149 lb 11.2 oz)   11/17/17 70.9 kg (156 lb 6.4 oz)   05/19/17 75.2 kg (165 lb 12.8 oz)     Constitutional: Well-developed, appearing stated age; cooperative  Eyes: Normal EOM, PERRLA, vision, conjunctiva, sclera  ENT: Normal external ears, nose, hearing, lips, teeth, gums, throat. No mucous membrane lesions, normal saliva pool  Neck: No mass or thyroid enlargement  Resp: Lungs clear to auscultation, nl to palpation  CV: RRR, no murmurs, rubs or gallops, no edema  GI: No ABD mass or tenderness, no HSM  : Not tested  Lymph: No cervical, supraclavicular, inguinal or epitrochlear nodes  MS: Mild changes of " angulation at DIPs and PIPs. No visible swelling at MCPs. left shoulder shows impairment in abduction and flexion - there is.resistence to abduction beyond 45 degrees, and external rotation is limited to about 15 degrees. Right shoulder external rotation is 45 degrees. The TMJ, neck, elbow, wrist, MCP, spine, hip, knee, ankle, and foot MTP/IP joints were examined and found normal. No active synovitis or altered joint anatomy. Full joint ROM. Normal  strength. No dactylitis,  tenosynovitis, enthesopathy.  Skin: No nail pitting, alopecia, rash, nodules or lesions  Neuro: Normal cranial nerves, strength, sensation, DTRs.   Psych: Normal judgement, orientation, memory, affect.     Laboratory:   Component      Latest Ref Rng & Units 8/13/2018   WBC      4.0 - 11.0 10e9/L 9.7   RBC Count      4.4 - 5.9 10e12/L 4.42   Hemoglobin      13.3 - 17.7 g/dL 13.2 (L)   Hematocrit      40.0 - 53.0 % 39.8 (L)   MCV      78 - 100 fl 90   MCH      26.5 - 33.0 pg 29.9   MCHC      31.5 - 36.5 g/dL 33.2   RDW      10.0 - 15.0 % 17.8 (H)   Platelet Count      150 - 450 10e9/L 251   Diff Method       Automated Method   % Neutrophils      % 84.7   % Lymphocytes      % 7.8   % Monocytes      % 6.4   % Eosinophils      % 0.7   % Basophils      % 0.4   Absolute Neutrophil      1.6 - 8.3 10e9/L 8.2   Absolute Lymphocytes      0.8 - 5.3 10e9/L 0.8   Absolute Monocytes      0.0 - 1.3 10e9/L 0.6   Absolute Eosinophils      0.0 - 0.7 10e9/L 0.1   Absolute Basophils      0.0 - 0.2 10e9/L 0.0   Sodium      133 - 144 mmol/L 139   Potassium      3.4 - 5.3 mmol/L 4.1   Chloride      94 - 109 mmol/L 104   Carbon Dioxide      20 - 32 mmol/L 25   Anion Gap      3 - 14 mmol/L 10   Glucose      70 - 99 mg/dL 104 (H)   Urea Nitrogen      7 - 30 mg/dL 17   Creatinine      0.66 - 1.25 mg/dL 1.00   GFR Estimate      >60 mL/min/1.7m2 71   GFR Estimate If Black      >60 mL/min/1.7m2 86   Calcium      8.5 - 10.1 mg/dL 8.8   Alkaline Phosphatase      40 - 150  U/L 77   ALT      0 - 70 U/L 23   AST      0 - 45 U/L 21   Bilirubin Total      0.2 - 1.3 mg/dL 0.4   Albumin      3.4 - 5.0 g/dL 3.8   Sed Rate      0 - 20 mm/h 11   CRP Inflammation      0.0 - 8.0 mg/L 15.0 (H)       Jayesh Carlisle, MS4  Medical Student     Scribe Disclosure:   I, Jose Garrido, am serving as a scribe to document services personally performed by Tee Goyal MD at this visit, based upon the provider's statements to me. All documentation has been reviewed by the aforementioned provider prior to being entered into the official medical record.     Portions of this medical record were completed by a scribe. UPON MY REVIEW AND AUTHENTICATION BY ELECTRONIC SIGNATURE, this confirms (a) I performed the applicable clinical services, and (b) the record is accurate.  Tee Goyal MD  Staff RheumatologistE[facility]EN

## 2018-10-26 ENCOUNTER — OFFICE VISIT (OUTPATIENT)
Dept: RHEUMATOLOGY | Facility: CLINIC | Age: 83
End: 2018-10-26
Attending: INTERNAL MEDICINE
Payer: MEDICARE

## 2018-10-26 VITALS
HEART RATE: 79 BPM | TEMPERATURE: 98.5 F | OXYGEN SATURATION: 96 % | DIASTOLIC BLOOD PRESSURE: 70 MMHG | HEIGHT: 68 IN | SYSTOLIC BLOOD PRESSURE: 146 MMHG | BODY MASS INDEX: 24.41 KG/M2 | WEIGHT: 161.1 LBS

## 2018-10-26 DIAGNOSIS — D64.9 ANEMIA, UNSPECIFIED TYPE: ICD-10-CM

## 2018-10-26 DIAGNOSIS — Z51.81 ENCOUNTER FOR THERAPEUTIC DRUG MONITORING: ICD-10-CM

## 2018-10-26 DIAGNOSIS — M35.3 PMR (POLYMYALGIA RHEUMATICA) (H): ICD-10-CM

## 2018-10-26 DIAGNOSIS — D64.9 ANEMIA: ICD-10-CM

## 2018-10-26 DIAGNOSIS — M72.2 PLANTAR FASCIITIS: ICD-10-CM

## 2018-10-26 DIAGNOSIS — Z87.39 HISTORY OF CALCIUM PYROPHOSPHATE DEPOSITION DISEASE (CPPD): ICD-10-CM

## 2018-10-26 DIAGNOSIS — Z87.39 HX OF CALCIUM PYROPHOSPHATE DEPOSITION DISEASE (CPPD): ICD-10-CM

## 2018-10-26 DIAGNOSIS — M35.3 POLYMYALGIA RHEUMATICA (H): ICD-10-CM

## 2018-10-26 LAB
ALT SERPL W P-5'-P-CCNC: 27 U/L (ref 0–70)
AST SERPL W P-5'-P-CCNC: 21 U/L (ref 0–45)
CRP SERPL-MCNC: 11.4 MG/L (ref 0–8)
ERYTHROCYTE [DISTWIDTH] IN BLOOD BY AUTOMATED COUNT: 17.3 % (ref 10–15)
ERYTHROCYTE [SEDIMENTATION RATE] IN BLOOD BY WESTERGREN METHOD: 9 MM/H (ref 0–20)
HCT VFR BLD AUTO: 44.2 % (ref 40–53)
HGB BLD-MCNC: 14 G/DL (ref 13.3–17.7)
MCH RBC QN AUTO: 29.2 PG (ref 26.5–33)
MCHC RBC AUTO-ENTMCNC: 31.7 G/DL (ref 31.5–36.5)
MCV RBC AUTO: 92 FL (ref 78–100)
PLATELET # BLD AUTO: 230 10E9/L (ref 150–450)
RBC # BLD AUTO: 4.79 10E12/L (ref 4.4–5.9)
WBC # BLD AUTO: 10.2 10E9/L (ref 4–11)

## 2018-10-26 PROCEDURE — 86140 C-REACTIVE PROTEIN: CPT | Performed by: INTERNAL MEDICINE

## 2018-10-26 PROCEDURE — 85027 COMPLETE CBC AUTOMATED: CPT | Performed by: INTERNAL MEDICINE

## 2018-10-26 PROCEDURE — 84450 TRANSFERASE (AST) (SGOT): CPT | Performed by: INTERNAL MEDICINE

## 2018-10-26 PROCEDURE — 36415 COLL VENOUS BLD VENIPUNCTURE: CPT | Performed by: INTERNAL MEDICINE

## 2018-10-26 PROCEDURE — 84460 ALANINE AMINO (ALT) (SGPT): CPT | Performed by: INTERNAL MEDICINE

## 2018-10-26 PROCEDURE — G0463 HOSPITAL OUTPT CLINIC VISIT: HCPCS | Mod: ZF

## 2018-10-26 PROCEDURE — 85652 RBC SED RATE AUTOMATED: CPT | Performed by: INTERNAL MEDICINE

## 2018-10-26 RX ORDER — PREDNISONE 5 MG/1
5 TABLET ORAL DAILY
Qty: 90 TABLET | Refills: 1 | Status: SHIPPED | OUTPATIENT
Start: 2018-10-26 | End: 2020-01-08

## 2018-10-26 RX ORDER — PREDNISONE 1 MG/1
TABLET ORAL
Qty: 200 TABLET | Refills: 2 | Status: SHIPPED | OUTPATIENT
Start: 2018-10-26 | End: 2019-04-19

## 2018-10-26 RX ORDER — ALENDRONATE SODIUM 70 MG/1
70 TABLET ORAL WEEKLY
Qty: 12 TABLET | Refills: 3 | Status: SHIPPED | OUTPATIENT
Start: 2018-10-26 | End: 2020-01-08

## 2018-10-26 ASSESSMENT — PAIN SCALES - GENERAL: PAINLEVEL: NO PAIN (0)

## 2018-10-26 NOTE — PATIENT INSTRUCTIONS
Plan:   Limit tylenol to 1000 mg per dose, 2-3 doses per day maximum, for joint pain.  Increase methotrexate to 7 tabs weekly  Decrease prednisone to 5 mg daily; taper to 4 mg daily after 1 month.  Bloodwork today and in 3 months.

## 2018-10-26 NOTE — NURSING NOTE
Chief Complaint   Patient presents with     RECHECK     polymyalgia rheumatica   Pt roomed, vitals, meds, and allergies reviewed with pt. Pt ready for provider.  Cruz Leija, CMA

## 2018-10-26 NOTE — LETTER
10/26/2018       RE: Christian Akers  [address]  [place] [facility] [address]     Dear Colleague,    Thank you for referring your patient, Christian Akers, to the [facility] RHEUMATOLOGY at [facility]. Please see a copy of my visit note below.    [facility]  Rheumatology Clinic  Tee Goyal MD  10/26/2018     Name: Christian Akers  MRN: [id number]  Age: 88 year old  : 1930  Referring provider: Luana Martinez    Patient's daughter and son were present for the entire visit.    Assessment and Plan:    #Polymyalgia Rheumatica  History of neck/shoulder pain, myalgia, weakness, elevated inflammatory markers. Bilateral temporal artery biopsy negative in 2016, but after 4-5 months of high dose prednisone. Patient currently on 6mg prednisone daily and 15 mg methotrexate weekly. Fatigue is present but manageable. No symptoms to suggest GCA.    #Calcium Pyrophosphate Deposition Disease  Chondrocalcinosis of wrists. History of acute pseudogout left knee in , left knee aspiration with 2100 WBC's, intracellular CPP. History of crystalline-sounding left ankle monoarthritis with corticosteroid injection 2017. Has not required larger doses of steroids for flares since last visit.    #DJD of bilateral hips, moderate-severe  #DJD of left shoulder  Patient's pain is well controlled at present with OTC tylenol and home physical therapy exercises.     #Polyarticular inflammatory arthritis-right knee aspiration 2016 with 13,000 WBC's, no crystals  No flares of inflammatory arthritis since last visit.    #Osteopenia  Last DEXA reportedly done in Summer 2018 however this is not in our system. On Fosamax 70mg weekly, plus Ca and Vitamin D.    #Long-term use of high doses of corticosteroids    #Monoclonal proteinemia    #History of unprovoked DVT/PE in , treated with 12 months anticoagulation    Plan  - increase methotrexate to 7 tabs per week  - decrease  prednisone to 5mg daily [id number], then decrease to 4mg daily  - limit tylenol to 1000mg max dose, 2-3x day for pain  - continue alendronate 70mg/wk for treatment of osteopenia  - continue home physical therapy and encourage walking for weight bearing exercise  - recheck inflammatory markers, CBC, LFT's, Creatinine  - follow up in 6 months    Follow-up: Return in about 6 months (around 4/26/2019).     HPI:   Christian Akers has a history of polyarticular inflammatory arthritis, pseudogout, and osteoarthritis and presents for follow up. He was last seen on 4/27/18 by Dr. Navarrete, at which time the plan was to continue prednisone 5mg daily (40mg daily for 5 days in the event of a flare), methotrexate 6 tablets weekly, and calcium, vitamin D, and a bisphosphonate for his osteopenia. If he were to have flares of mono or oligoarticular arthritis, this would be very suspicious of CPPD, and we may consider addition of plaquenil or colchicine.    Today, the patient reports feeling well but notes that his left shoulder bothers him. He has morning stiffness in his shoulder that takes around half an hour to resolve. He mentions taking tylenol arthritis, two pills (reportedly 650mg/pill) twice daily. His biggest complaint is fatigue, which was moderately improved in July 2018 when his daughter increased his prednisone to 6mg daily. He has not had any joint pains or flares of joint symptoms since July. He does PT exercises at home and denies having pain in his hips or knees. He has not had any intraarticular steroid injections since last visit. He mentions taking alendronate 70mg/week for osteopenia (DEXA scan reportedly done 6 months ago at [place]).    Patient denies any fevers, chills, weight changes, headache, eye pain, vision changes, jaw claudication, or GI symptoms.    Interval History 4/27/18:  Patient reported that his hips were bothering him. He had an x-ray that showed DJD. He had injections for left shoulder pain and  ankle pain. He was on methotrexate 6 tablets weekly and prednisone 5mg daily. His partner was hospitalized for 2 weeks for what sounded like a subdural hematoma after a fall, and he had lost some weight due to stress and not eating well because of that.    Review of Systems:   Pertinent items are noted in HPI or as below, remainder of complete ROS is negative.      No recent problems with hearing or vision. No swallowing problems.    No breathing difficulty, shortness of breath, coughing, or wheezing  No chest pain or palpitations  No heart burn, indigestion, abdominal pain, nausea, vomiting, diarrhea  No urination problems, no bloody, cloudy urine, no dysuria  No numbing, tingling, weakness  No headaches or confusion  No rashes. No easy bleeding or bruising.   +fatigue    Active Medications:     Current Outpatient Prescriptions:      Acetaminophen (TYLENOL PO), Take 500 mg by mouth every 6 hours as needed for mild pain or fever, Disp: , Rfl:      alendronate (FOSAMAX) 70 MG tablet, Take 1 tablet (70 mg) by mouth once a week Takes every Sunday, Disp: 12 tablet, Rfl: 3     AMLODIPINE BESYLATE PO, Take 5 mg by mouth daily, Disp: , Rfl:      ASPIRIN PO, Take 81 mg by mouth daily, Disp: , Rfl:      brimonidine (ALPHAGAN-P) 0.15 % ophthalmic solution, Place 1 drop into both eyes 2 times daily , Disp: , Rfl:      calcium-vitamin D (CALTRATE) 600-400 MG-UNIT per tablet, Take 1 tablet by mouth 2 times daily, Disp: 60 tablet, Rfl: 11     cholecalciferol (VITAMIN D3) 1000 UNIT tablet, Take 1 tablet (1,000 Units) by mouth daily, Disp: 30 tablet, Rfl: 11     FOLIC ACID PO, Take 800 mcg by mouth daily, Disp: , Rfl:      methotrexate 2.5 MG tablet CHEMO, Take 7 tablets (17.5 mg) by mouth once a week, Disp: 84 tablet, Rfl: 1     OMEPRAZOLE PO, Take 20 mg by mouth daily, Disp: , Rfl:      predniSONE (DELTASONE) 1 MG tablet, Take 4 tabs daily while tapering prednisone per Doctor instructions, Disp: 200 tablet, Rfl: 2     predniSONE  "(DELTASONE) 10 MG tablet, 40 mg daily for 5 days, then 20 mg daily for 5 days then off (Patient taking differently: 6 mg 40 mg daily for 5 days, then 20 mg daily for 5 days then off), Disp: 30 tablet, Rfl: 1     predniSONE (DELTASONE) 5 MG tablet, Take 1 tablet (5 mg) by mouth daily, Disp: 90 tablet, Rfl: 1     tamsulosin (FLOMAX) 0.4 MG capsule, Take 0.8 mg by mouth daily, Disp: , Rfl:      [DISCONTINUED] alendronate (FOSAMAX) 70 MG tablet, Take 1 tablet (70 mg) by mouth once a week Takes every Sunday, Disp: 12 tablet, Rfl: 3     [DISCONTINUED] methotrexate 2.5 MG tablet CHEMO, Take 6 tablets (15 mg) by mouth once a week, Disp: 72 tablet, Rfl: 0      Allergies:   Cialis  [tadalafil]      Past Medical History:  Anemia  HTN  HLD  BPH with negative prostate biopsies in 2005  RBBB  Unprovoked PE 2/2015, treated with anticoagulation for 12 months  Seronegative inflammatory arthritis  OA  UTI in 2004 with hemorrhagic cystitis in 2008  Borderline glaucoma  Acute pseudogout of left knee 11/2015   Osteopenia     Past Surgical History:  Left inguinal hernia repair  Cataract repair  Left TKA 4/2016  Bilateral temporal artery biopsies 11/2016-negative    Family History:   Father - cancer  Mother - cancer  Brother - prostate cancer  Sister - cancer     Social History:   Former smoker  No smokeless tobacco use  Occasional alcohol use, 1 drink/week     Physical Exam:   /70  Pulse 79  Temp 98.5  F (36.9  C) (Oral)  Ht 1.715 m (5' 7.5\")  Wt 73.1 kg (161 lb 1.6 oz)  SpO2 96%  BMI 24.86 kg/m2   Wt Readings from Last 4 Encounters:   10/26/18 73.1 kg (161 lb 1.6 oz)   04/27/18 67.9 kg (149 lb 11.2 oz)   11/17/17 70.9 kg (156 lb 6.4 oz)   05/19/17 75.2 kg (165 lb 12.8 oz)     Constitutional: Well-developed, appearing stated age; cooperative  Eyes: Normal EOM, PERRLA, vision, conjunctiva, sclera  ENT: Normal external ears, nose, hearing, lips, teeth, gums, throat. No mucous membrane lesions, normal saliva pool  Neck: No mass " or thyroid enlargement  Resp: Lungs clear to auscultation, nl to palpation  CV: RRR, no murmurs, rubs or gallops, no edema  GI: No ABD mass or tenderness, no HSM  : Not tested  Lymph: No cervical, supraclavicular, inguinal or epitrochlear nodes  MS: Mild changes of angulation at DIPs and PIPs. No visible swelling at MCPs. left shoulder shows impairment in abduction and flexion - there is.resistence to abduction beyond 45 degrees, and external rotation is limited to about 15 degrees. Right shoulder external rotation is 45 degrees. The TMJ, neck, elbow, wrist, MCP, spine, hip, knee, ankle, and foot MTP/IP joints were examined and found normal. No active synovitis or altered joint anatomy. Full joint ROM. Normal  strength. No dactylitis,  tenosynovitis, enthesopathy.  Skin: No nail pitting, alopecia, rash, nodules or lesions  Neuro: Normal cranial nerves, strength, sensation, DTRs.   Psych: Normal judgement, orientation, memory, affect.     Laboratory:   Component      Latest Ref Rng & Units 8/13/2018   WBC      4.0 - 11.0 10e9/L 9.7   RBC Count      4.4 - 5.9 10e12/L 4.42   Hemoglobin      13.3 - 17.7 g/dL 13.2 (L)   Hematocrit      40.0 - 53.0 % 39.8 (L)   MCV      78 - 100 fl 90   MCH      26.5 - 33.0 pg 29.9   MCHC      31.5 - 36.5 g/dL 33.2   RDW      10.0 - 15.0 % 17.8 (H)   Platelet Count      150 - 450 10e9/L 251   Diff Method       Automated Method   % Neutrophils      % 84.7   % Lymphocytes      % 7.8   % Monocytes      % 6.4   % Eosinophils      % 0.7   % Basophils      % 0.4   Absolute Neutrophil      1.6 - 8.3 10e9/L 8.2   Absolute Lymphocytes      0.8 - 5.3 10e9/L 0.8   Absolute Monocytes      0.0 - 1.3 10e9/L 0.6   Absolute Eosinophils      0.0 - 0.7 10e9/L 0.1   Absolute Basophils      0.0 - 0.2 10e9/L 0.0   Sodium      133 - 144 mmol/L 139   Potassium      3.4 - 5.3 mmol/L 4.1   Chloride      94 - 109 mmol/L 104   Carbon Dioxide      20 - 32 mmol/L 25   Anion Gap      3 - 14 mmol/L 10   Glucose       70 - 99 mg/dL 104 (H)   Urea Nitrogen      7 - 30 mg/dL 17   Creatinine      0.66 - 1.25 mg/dL 1.00   GFR Estimate      >60 mL/min/1.7m2 71   GFR Estimate If Black      >60 mL/min/1.7m2 86   Calcium      8.5 - 10.1 mg/dL 8.8   Alkaline Phosphatase      40 - 150 U/L 77   ALT      0 - 70 U/L 23   AST      0 - 45 U/L 21   Bilirubin Total      0.2 - 1.3 mg/dL 0.4   Albumin      3.4 - 5.0 g/dL 3.8   Sed Rate      0 - 20 mm/h 11   CRP Inflammation      0.0 - 8.0 mg/L 15.0 (H)       Jayesh Carlisle, MS4  Medical Student     Scribe Disclosure:   I, Jose Garrido, am serving as a scribe to document services personally performed by Tee Goyal MD at this visit, based upon the provider's statements to me. All documentation has been reviewed by the aforementioned provider prior to being entered into the official medical record.     Portions of this medical record were completed by a scribe. UPON MY REVIEW AND AUTHENTICATION BY ELECTRONIC SIGNATURE, this confirms (a) I performed the applicable clinical services, and (b) the record is accurate.  Tee Goyal MD  Staff Rheumatologist, [facility]

## 2018-10-26 NOTE — MR AVS SNAPSHOT
After Visit Summary   10/26/2018    Christian Akers    MRN: [id number]           Patient Information     Date Of Birth          2/16/1930        Visit Information        Provider Department      10/26/2018 9:00 AM Tee Goyal MD [facility]        Today's Diagnoses     PMR (polymyalgia rheumatica) (H)        Hx of calcium pyrophosphate deposition disease (CPPD)        History of calcium pyrophosphate deposition disease (CPPD)        Encounter for therapeutic drug monitoring        Plantar fasciitis        Anemia, unspecified type          Care Instructions    Plan:   Limit tylenol to 1000 mg per dose, 2-3 doses per day maximum, for joint pain.  Increase methotrexate to 7 tabs weekly  Decrease prednisone to 5 mg daily; taper to 4 mg daily after 1 month.  Bloodwork today and in 3 months.            Follow-ups after your visit        Follow-up notes from your care team     Return in about 6 months (around 4/26/2019).      Your next 10 appointments already scheduled     Apr 19, 2019  9:00 AM CDT   (Arrive by 8:45 AM)   Return Visit with Tee Goyal MD   [facility] ([facility])    [address] [place] [id number] [phone]              Future tests that were ordered for you today     Open Standing Orders        Priority Remaining Interval Expires Ordered    ALT Routine 4/4 q 10-12 weeks 10/26/2019 10/26/2018    AST Routine 4/4 q 10-12 weeks 10/26/2019 10/26/2018    CBC with platelets Routine 4/4 q 10-12 weeks 10/26/2019 10/26/2018            Who to contact     If you have questions or need follow up information about today's clinic visit or your schedule please contact [facility] directly at [phone].  Normal or non-critical lab and imaging results will be communicated to you by MyChart, letter or phone within 4 business days after the clinic has received the results. If you do not hear from us within  "7 days, please contact the clinic through [employer] or phone. If you have a critical or abnormal lab result, we will notify you by phone as soon as possible.  Submit refill requests through [employer] or call your pharmacy and they will forward the refill request to us. Please allow 3 business days for your refill to be completed.          Additional Information About Your Visit        [employer] Information     [employer] gives you secure access to your electronic health record. If you see a primary care provider, you can also send messages to your care team and make appointments. If you have questions, please call your primary care clinic.  If you do not have a primary care provider, please call [phone] and they will assist you.        Care EveryWhere ID     This is your Care EveryWhere ID. This could be used by other organizations to access your [facility] medical records  [id number]        Your Vitals Were     Pulse Temperature Height Pulse Oximetry BMI (Body Mass Index)       79 98.5  F (36.9  C) (Oral) 1.715 m (5' 7.5\") 96% 24.86 kg/m2        Blood Pressure from Last 3 Encounters:   10/26/18 146/70   04/27/18 167/78   11/17/17 148/77    Weight from Last 3 Encounters:   10/26/18 73.1 kg (161 lb 1.6 oz)   04/27/18 67.9 kg (149 lb 11.2 oz)   11/17/17 70.9 kg (156 lb 6.4 oz)                 Today's Medication Changes          These changes are accurate as of 10/26/18 10:13 AM.  If you have any questions, ask your nurse or doctor.               These medicines have changed or have updated prescriptions.        Dose/Directions    methotrexate 2.5 MG tablet CHEMO   This may have changed:  how much to take   Used for:  PMR (polymyalgia rheumatica) (H), Hx of calcium pyrophosphate deposition disease (CPPD), History of calcium pyrophosphate deposition disease (CPPD), Encounter for therapeutic drug monitoring, Plantar fasciitis, Anemia, unspecified type   Changed by:  Tee Goyal MD        Dose:  17.5 mg   Take 7 " tablets (17.5 mg) by mouth once a week   Quantity:  84 tablet   Refills:  1       * predniSONE 10 MG tablet   Commonly known as:  DELTASONE   This may have changed:    - how much to take  - additional instructions   Used for:  Pseudogout        40 mg daily for 5 days, then 20 mg daily for 5 days then off   Quantity:  30 tablet   Refills:  1       * predniSONE 5 MG tablet   Commonly known as:  DELTASONE   This may have changed:    - Another medication with the same name was added. Make sure you understand how and when to take each.  - Another medication with the same name was changed. Make sure you understand how and when to take each.   Used for:  Hx of calcium pyrophosphate deposition disease (CPPD), PMR (polymyalgia rheumatica) (H), History of calcium pyrophosphate deposition disease (CPPD), Encounter for therapeutic drug monitoring, Plantar fasciitis, Anemia, unspecified type   Changed by:  Tee Goyal MD        Dose:  5 mg   Take 1 tablet (5 mg) by mouth daily   Quantity:  90 tablet   Refills:  1       * predniSONE 1 MG tablet   Commonly known as:  DELTASONE   This may have changed:  You were already taking a medication with the same name, and this prescription was added. Make sure you understand how and when to take each.   Used for:  PMR (polymyalgia rheumatica) (H), Hx of calcium pyrophosphate deposition disease (CPPD), History of calcium pyrophosphate deposition disease (CPPD), Encounter for therapeutic drug monitoring, Plantar fasciitis, Anemia, unspecified type   Changed by:  Tee Goyal MD        Take 4 tabs daily while tapering prednisone per Doctor instructions   Quantity:  200 tablet   Refills:  2       * Notice:  This list has 3 medication(s) that are the same as other medications prescribed for you. Read the directions carefully, and ask your doctor or other care provider to review them with you.         Where to get your medicines      These medications were sent to [facility] -  [place] - [address]  [address] [place] [address]     Phone:  [phone]     alendronate 70 MG tablet    methotrexate 2.5 MG tablet CHEMO    predniSONE 1 MG tablet    predniSONE 5 MG tablet                Primary Care Provider Office Phone # Fax #    Luana Martinez [phone]       [facility] [address] [place] [address]   Replaced by [facility] [id number]        Equal Access to Services     GERBER BEE : Hadii aad ku hadasho Soomaali, waaxda luqadaha, qaybta kaalmada adeegyada, waxay idiin hayaan adeeg kharahector la'ellen . So [facility] [phone].    ATENCIÓN: Si crystal quintana, tiene a sultana disposición servicios gratuitos de asistencia lingüística. [facility] [phone].    We comply with applicable federal civil rights laws and [place] laws. We do not discriminate on the basis of race, color, national origin, age, disability, sex, sexual orientation, or gender identity.            Thank you!     Thank you for choosing [facility]  for your care. Our goal is always to provide you with excellent care. Hearing back from our patients is one way we can continue to improve our services. Please take a few minutes to complete the written survey that you may receive in the mail after your visit with us. Thank you!             Your Updated Medication List - Protect others around you: Learn how to safely use, store and throw away your medicines at www.disposemymeds.org.          This list is accurate as of 10/26/18 10:13 AM.  Always use your most recent med list.                   Brand Name Dispense Instructions for use Diagnosis    alendronate 70 MG tablet    FOSAMAX    12 tablet    Take 1 tablet (70 mg) by mouth once a week Takes every Sunday    History of calcium pyrophosphate deposition disease (CPPD), PMR (polymyalgia rheumatica) (H), Hx of calcium pyrophosphate deposition disease (CPPD), Encounter for therapeutic drug monitoring, Plantar fasciitis, Anemia, unspecified type        AMLODIPINE BESYLATE PO      Take 5 mg by mouth daily        ASPIRIN PO      Take 81 mg by mouth daily        brimonidine 0.15 % ophthalmic solution    ALPHAGAN-P     Place 1 drop into both eyes 2 times daily        calcium carbonate 600 mg-vitamin D 400 units 600-400 MG-UNIT per tablet    CALTRATE    60 tablet    Take 1 tablet by mouth 2 times daily    Hx of calcium pyrophosphate deposition disease (CPPD)       cholecalciferol 1000 UNIT tablet    vitamin D3    30 tablet    Take 1 tablet (1,000 Units) by mouth daily    Hx of calcium pyrophosphate deposition disease (CPPD)       FLOMAX 0.4 MG capsule   Generic drug:  tamsulosin      Take 0.8 mg by mouth daily        FOLIC ACID PO      Take 800 mcg by mouth daily        methotrexate 2.5 MG tablet CHEMO     84 tablet    Take 7 tablets (17.5 mg) by mouth once a week    PMR (polymyalgia rheumatica) (H), Hx of calcium pyrophosphate deposition disease (CPPD), History of calcium pyrophosphate deposition disease (CPPD), Encounter for therapeutic drug monitoring, Plantar fasciitis, Anemia, unspecified type       OMEPRAZOLE PO      Take 20 mg by mouth daily        * predniSONE 10 MG tablet    DELTASONE    30 tablet    40 mg daily for 5 days, then 20 mg daily for 5 days then off    Pseudogout       * predniSONE 5 MG tablet    DELTASONE    90 tablet    Take 1 tablet (5 mg) by mouth daily    Hx of calcium pyrophosphate deposition disease (CPPD), PMR (polymyalgia rheumatica) (H), History of calcium pyrophosphate deposition disease (CPPD), Encounter for therapeutic drug monitoring, Plantar fasciitis, Anemia, unspecified type       * predniSONE 1 MG tablet    DELTASONE    200 tablet    Take 4 tabs daily while tapering prednisone per Doctor instructions    PMR (polymyalgia rheumatica) (H), Hx of calcium pyrophosphate deposition disease (CPPD), History of calcium pyrophosphate deposition disease (CPPD), Encounter for therapeutic drug monitoring, Plantar fasciitis, Anemia, unspecified  type       TYLENOL PO      Take 500 mg by mouth every 6 hours as needed for mild pain or fever        * Notice:  This list has 3 medication(s) that are the same as other medications prescribed for you. Read the directions carefully, and ask your doctor or other care provider to review them with you.

## 2018-10-26 NOTE — LETTER
10/26/2018      RE: Christian Akers  [address] [facility] [address]       [facility]  Tee Goyal MD  10/26/2018     Name: Christian Akers  MRN: [id number]  Age: 88 year old  : 1930  Referring provider: Luana Martinez    Patient's daughter and son were present for the entire visit.    Assessment and Plan:    #Polymyalgia Rheumatica  History of neck/shoulder pain, myalgia, weakness, elevated inflammatory markers. Bilateral temporal artery biopsy negative in 2016, but after 4-5 months of high dose prednisone. Patient currently on 6mg prednisone daily and 15 mg methotrexate weekly. Fatigue is present but manageable. No symptoms to suggest GCA.    #Calcium Pyrophosphate Deposition Disease  Chondrocalcinosis of wrists. History of acute pseudogout left knee in , left knee aspiration with 2100 WBC's, intracellular CPP. History of crystalline-sounding left ankle monoarthritis with corticosteroid injection 2017. Has not required larger doses of steroids for flares since last visit.    #DJD of bilateral hips, moderate-severe  #DJD of left shoulder  Patient's pain is well controlled at present with OTC tylenol and home physical therapy exercises.     #Polyarticular inflammatory arthritis-right knee aspiration 2016 with 13,000 WBC's, no crystals  No flares of inflammatory arthritis since last visit.    #Osteopenia  Last DEXA reportedly done in Summer 2018 however this is not in our system. On Fosamax 70mg weekly, plus Ca and Vitamin D.    #Long-term use of high doses of corticosteroids    #Monoclonal proteinemia    #History of unprovoked DVT/PE in , treated with 12 months anticoagulation    Plan  - increase methotrexate to 7 tabs per week  - decrease prednisone to 5mg daily f1aidus, then decrease to 4mg daily  - limit tylenol to 1000mg max dose, 2-3x day for pain  - continue alendronate 70mg/wk for treatment of osteopenia  - continue home physical therapy  and encourage walking for weight bearing exercise  - recheck inflammatory markers, CBC, LFT's, Creatinine  - follow up in 6 months    Follow-up: Return in about 6 months (around 4/26/2019).     HPI:   Christian Akers has a history of polyarticular inflammatory arthritis, pseudogout, and osteoarthritis and presents for follow up. He was last seen on 4/27/18 by Dr. Navarrete, at which time the plan was to continue prednisone 5mg daily (40mg daily for 5 days in the event of a flare), methotrexate 6 tablets weekly, and calcium, vitamin D, and a bisphosphonate for his osteopenia. If he were to have flares of mono or oligoarticular arthritis, this would be very suspicious of CPPD, and we may consider addition of plaquenil or colchicine.    Today, the patient reports feeling well but notes that his left shoulder bothers him. He has morning stiffness in his shoulder that takes around half an hour to resolve. He mentions taking tylenol arthritis, two pills (reportedly 650mg/pill) twice daily. His biggest complaint is fatigue, which was moderately improved in July 2018 when his daughter increased his prednisone to 6mg daily. He has not had any joint pains or flares of joint symptoms since July. He does PT exercises at home and denies having pain in his hips or knees. He has not had any intraarticular steroid injections since last visit. He mentions taking alendronate 70mg/week for osteopenia (DEXA scan reportedly done 6 months ago at [place]).    Patient denies any fevers, chills, weight changes, headache, eye pain, vision changes, jaw claudication, or GI symptoms.    Interval History 4/27/18:  Patient reported that his hips were bothering him. He had an x-ray that showed DJD. He had injections for left shoulder pain and ankle pain. He was on methotrexate 6 tablets weekly and prednisone 5mg daily. His partner was hospitalized for 2 weeks for what sounded like a subdural hematoma after a fall, and he had lost some weight due to  stress and not eating well because of that.    Review of Systems:   Pertinent items are noted in HPI or as below, remainder of complete ROS is negative.      No recent problems with hearing or vision. No swallowing problems.    No breathing difficulty, shortness of breath, coughing, or wheezing  No chest pain or palpitations  No heart burn, indigestion, abdominal pain, nausea, vomiting, diarrhea  No urination problems, no bloody, cloudy urine, no dysuria  No numbing, tingling, weakness  No headaches or confusion  No rashes. No easy bleeding or bruising.   +fatigue    Active Medications:     Current Outpatient Prescriptions:      Acetaminophen (TYLENOL PO), Take 500 mg by mouth every 6 hours as needed for mild pain or fever, Disp: , Rfl:      alendronate (FOSAMAX) 70 MG tablet, Take 1 tablet (70 mg) by mouth once a week Takes every Sunday, Disp: 12 tablet, Rfl: 3     AMLODIPINE BESYLATE PO, Take 5 mg by mouth daily, Disp: , Rfl:      ASPIRIN PO, Take 81 mg by mouth daily, Disp: , Rfl:      brimonidine (ALPHAGAN-P) 0.15 % ophthalmic solution, Place 1 drop into both eyes 2 times daily , Disp: , Rfl:      calcium-vitamin D (CALTRATE) 600-400 MG-UNIT per tablet, Take 1 tablet by mouth 2 times daily, Disp: 60 tablet, Rfl: 11     cholecalciferol (VITAMIN D3) 1000 UNIT tablet, Take 1 tablet (1,000 Units) by mouth daily, Disp: 30 tablet, Rfl: 11     FOLIC ACID PO, Take 800 mcg by mouth daily, Disp: , Rfl:      methotrexate 2.5 MG tablet CHEMO, Take 7 tablets (17.5 mg) by mouth once a week, Disp: 84 tablet, Rfl: 1     OMEPRAZOLE PO, Take 20 mg by mouth daily, Disp: , Rfl:      predniSONE (DELTASONE) 1 MG tablet, Take 4 tabs daily while tapering prednisone per Doctor instructions, Disp: 200 tablet, Rfl: 2     predniSONE (DELTASONE) 10 MG tablet, 40 mg daily for 5 days, then 20 mg daily for 5 days then off (Patient taking differently: 6 mg 40 mg daily for 5 days, then 20 mg daily for 5 days then off), Disp: 30 tablet, Rfl: 1     " predniSONE (DELTASONE) 5 MG tablet, Take 1 tablet (5 mg) by mouth daily, Disp: 90 tablet, Rfl: 1     tamsulosin (FLOMAX) 0.4 MG capsule, Take 0.8 mg by mouth daily, Disp: , Rfl:      [DISCONTINUED] alendronate (FOSAMAX) 70 MG tablet, Take 1 tablet (70 mg) by mouth once a week Takes every Sunday, Disp: 12 tablet, Rfl: 3     [DISCONTINUED] methotrexate 2.5 MG tablet CHEMO, Take 6 tablets (15 mg) by mouth once a week, Disp: 72 tablet, Rfl: 0      Allergies:   Cialis  [tadalafil]      Past Medical History:  Anemia  HTN  HLD  BPH with negative prostate biopsies in 2005  RBBB  Unprovoked PE 2/2015, treated with anticoagulation for 12 months  Seronegative inflammatory arthritis  OA  UTI in 2004 with hemorrhagic cystitis in 2008  Borderline glaucoma  Acute pseudogout of left knee 11/2015   Osteopenia     Past Surgical History:  Left inguinal hernia repair  Cataract repair  Left TKA 4/2016  Bilateral temporal artery biopsies 11/2016-negative    Family History:   Father - cancer  Mother - cancer  Brother - prostate cancer  Sister - cancer     Social History:   Former smoker  No smokeless tobacco use  Occasional alcohol use, 1 drink/week     Physical Exam:   /70  Pulse 79  Temp 98.5  F (36.9  C) (Oral)  Ht 1.715 m (5' 7.5\")  Wt 73.1 kg (161 lb 1.6 oz)  SpO2 96%  BMI 24.86 kg/m2   Wt Readings from Last 4 Encounters:   10/26/18 73.1 kg (161 lb 1.6 oz)   04/27/18 67.9 kg (149 lb 11.2 oz)   11/17/17 70.9 kg (156 lb 6.4 oz)   05/19/17 75.2 kg (165 lb 12.8 oz)     Constitutional: Well-developed, appearing stated age; cooperative  Eyes: Normal EOM, PERRLA, vision, conjunctiva, sclera  ENT: Normal external ears, nose, hearing, lips, teeth, gums, throat. No mucous membrane lesions, normal saliva pool  Neck: No mass or thyroid enlargement  Resp: Lungs clear to auscultation, nl to palpation  CV: RRR, no murmurs, rubs or gallops, no edema  GI: No ABD mass or tenderness, no HSM  : Not tested  Lymph: No cervical, " supraclavicular, inguinal or epitrochlear nodes  MS: Mild changes of angulation at DIPs and PIPs. No visible swelling at MCPs. left shoulder shows impairment in abduction and flexion - there is.resistence to abduction beyond 45 degrees, and external rotation is limited to about 15 degrees. Right shoulder external rotation is 45 degrees. The TMJ, neck, elbow, wrist, MCP, spine, hip, knee, ankle, and foot MTP/IP joints were examined and found normal. No active synovitis or altered joint anatomy. Full joint ROM. Normal  strength. No dactylitis,  tenosynovitis, enthesopathy.  Skin: No nail pitting, alopecia, rash, nodules or lesions  Neuro: Normal cranial nerves, strength, sensation, DTRs.   Psych: Normal judgement, orientation, memory, affect.     Laboratory:   Component      Latest Ref Rng & Units 8/13/2018   WBC      4.0 - 11.0 10e9/L 9.7   RBC Count      4.4 - 5.9 10e12/L 4.42   Hemoglobin      13.3 - 17.7 g/dL 13.2 (L)   Hematocrit      40.0 - 53.0 % 39.8 (L)   MCV      78 - 100 fl 90   MCH      26.5 - 33.0 pg 29.9   MCHC      31.5 - 36.5 g/dL 33.2   RDW      10.0 - 15.0 % 17.8 (H)   Platelet Count      150 - 450 10e9/L 251   Diff Method       Automated Method   % Neutrophils      % 84.7   % Lymphocytes      % 7.8   % Monocytes      % 6.4   % Eosinophils      % 0.7   % Basophils      % 0.4   Absolute Neutrophil      1.6 - 8.3 10e9/L 8.2   Absolute Lymphocytes      0.8 - 5.3 10e9/L 0.8   Absolute Monocytes      0.0 - 1.3 10e9/L 0.6   Absolute Eosinophils      0.0 - 0.7 10e9/L 0.1   Absolute Basophils      0.0 - 0.2 10e9/L 0.0   Sodium      133 - 144 mmol/L 139   Potassium      3.4 - 5.3 mmol/L 4.1   Chloride      94 - 109 mmol/L 104   Carbon Dioxide      20 - 32 mmol/L 25   Anion Gap      3 - 14 mmol/L 10   Glucose      70 - 99 mg/dL 104 (H)   Urea Nitrogen      7 - 30 mg/dL 17   Creatinine      0.66 - 1.25 mg/dL 1.00   GFR Estimate      >60 mL/min/1.7m2 71   GFR Estimate If Black      >60 mL/min/1.7m2 86    Calcium      8.5 - 10.1 mg/dL 8.8   Alkaline Phosphatase      40 - 150 U/L 77   ALT      0 - 70 U/L 23   AST      0 - 45 U/L 21   Bilirubin Total      0.2 - 1.3 mg/dL 0.4   Albumin      3.4 - 5.0 g/dL 3.8   Sed Rate      0 - 20 mm/h 11   CRP Inflammation      0.0 - 8.0 mg/L 15.0 (H)       Jayesh Carlisle, MS4  Medical Student     Scribe Disclosure:   I, Jose Garrido, am serving as a scribe to document services personally performed by Tee Goyal MD at this visit, based upon the provider's statements to me. All documentation has been reviewed by the aforementioned provider prior to being entered into the official medical record.     Portions of this medical record were completed by a scribe. UPON MY REVIEW AND AUTHENTICATION BY ELECTRONIC SIGNATURE, this confirms (a) I performed the applicable clinical services, and (b) the record is accurate.  Tee Goyal MD  Staff Rheumatologist, [facility]

## 2018-12-07 ENCOUNTER — TRANSFERRED RECORDS (OUTPATIENT)
Dept: HEALTH INFORMATION MANAGEMENT | Facility: CLINIC | Age: 83
End: 2018-12-07

## 2018-12-27 DIAGNOSIS — M72.2 PLANTAR FASCIITIS: ICD-10-CM

## 2018-12-27 DIAGNOSIS — D64.9 ANEMIA, UNSPECIFIED TYPE: ICD-10-CM

## 2018-12-27 DIAGNOSIS — Z87.39 HISTORY OF CALCIUM PYROPHOSPHATE DEPOSITION DISEASE (CPPD): ICD-10-CM

## 2018-12-27 DIAGNOSIS — Z51.81 ENCOUNTER FOR THERAPEUTIC DRUG MONITORING: ICD-10-CM

## 2018-12-27 DIAGNOSIS — D64.9 ANEMIA: ICD-10-CM

## 2018-12-27 DIAGNOSIS — Z87.39 HX OF CALCIUM PYROPHOSPHATE DEPOSITION DISEASE (CPPD): ICD-10-CM

## 2018-12-27 DIAGNOSIS — M35.3 PMR (POLYMYALGIA RHEUMATICA) (H): ICD-10-CM

## 2018-12-27 DIAGNOSIS — M35.3 POLYMYALGIA RHEUMATICA (H): ICD-10-CM

## 2018-12-27 LAB
ALT SERPL W P-5'-P-CCNC: 20 U/L (ref 0–70)
AST SERPL W P-5'-P-CCNC: 19 U/L (ref 0–45)
CRP SERPL-MCNC: 3.3 MG/L (ref 0–8)
ERYTHROCYTE [DISTWIDTH] IN BLOOD BY AUTOMATED COUNT: 16.9 % (ref 10–15)
ERYTHROCYTE [SEDIMENTATION RATE] IN BLOOD BY WESTERGREN METHOD: 13 MM/H (ref 0–20)
HCT VFR BLD AUTO: 42.4 % (ref 40–53)
HGB BLD-MCNC: 14.3 G/DL (ref 13.3–17.7)
MCH RBC QN AUTO: 30.1 PG (ref 26.5–33)
MCHC RBC AUTO-ENTMCNC: 33.7 G/DL (ref 31.5–36.5)
MCV RBC AUTO: 89 FL (ref 78–100)
PLATELET # BLD AUTO: 256 10E9/L (ref 150–450)
RBC # BLD AUTO: 4.75 10E12/L (ref 4.4–5.9)
WBC # BLD AUTO: 9.1 10E9/L (ref 4–11)

## 2018-12-27 PROCEDURE — 86140 C-REACTIVE PROTEIN: CPT | Mod: ZL | Performed by: INTERNAL MEDICINE

## 2018-12-27 PROCEDURE — 36415 COLL VENOUS BLD VENIPUNCTURE: CPT | Mod: ZL | Performed by: INTERNAL MEDICINE

## 2018-12-27 PROCEDURE — 84450 TRANSFERASE (AST) (SGOT): CPT | Mod: ZL | Performed by: INTERNAL MEDICINE

## 2018-12-27 PROCEDURE — 84460 ALANINE AMINO (ALT) (SGPT): CPT | Mod: ZL | Performed by: INTERNAL MEDICINE

## 2018-12-27 PROCEDURE — 85027 COMPLETE CBC AUTOMATED: CPT | Mod: ZL | Performed by: INTERNAL MEDICINE

## 2018-12-27 PROCEDURE — 85652 RBC SED RATE AUTOMATED: CPT | Mod: ZL | Performed by: INTERNAL MEDICINE

## 2019-01-08 DIAGNOSIS — Z87.39 HX OF CALCIUM PYROPHOSPHATE DEPOSITION DISEASE (CPPD): ICD-10-CM

## 2019-01-08 DIAGNOSIS — Z87.39 HISTORY OF CALCIUM PYROPHOSPHATE DEPOSITION DISEASE (CPPD): ICD-10-CM

## 2019-01-08 DIAGNOSIS — M72.2 PLANTAR FASCIITIS: ICD-10-CM

## 2019-01-08 DIAGNOSIS — D64.9 ANEMIA, UNSPECIFIED TYPE: ICD-10-CM

## 2019-01-08 DIAGNOSIS — Z51.81 ENCOUNTER FOR THERAPEUTIC DRUG MONITORING: ICD-10-CM

## 2019-01-08 DIAGNOSIS — M35.3 PMR (POLYMYALGIA RHEUMATICA) (H): ICD-10-CM

## 2019-01-10 RX ORDER — ALENDRONATE SODIUM 70 MG/1
70 TABLET ORAL WEEKLY
Qty: 12 TABLET | Refills: 3 | OUTPATIENT
Start: 2019-01-10

## 2019-02-25 DIAGNOSIS — M72.2 PLANTAR FASCIITIS: ICD-10-CM

## 2019-02-25 DIAGNOSIS — Z87.39 HX OF CALCIUM PYROPHOSPHATE DEPOSITION DISEASE (CPPD): ICD-10-CM

## 2019-02-25 DIAGNOSIS — Z51.81 ENCOUNTER FOR THERAPEUTIC DRUG MONITORING: ICD-10-CM

## 2019-02-25 DIAGNOSIS — D64.9 ANEMIA: ICD-10-CM

## 2019-02-25 DIAGNOSIS — Z87.39 HISTORY OF CALCIUM PYROPHOSPHATE DEPOSITION DISEASE (CPPD): ICD-10-CM

## 2019-02-25 DIAGNOSIS — D64.9 ANEMIA, UNSPECIFIED TYPE: ICD-10-CM

## 2019-02-25 DIAGNOSIS — M35.3 POLYMYALGIA RHEUMATICA (H): ICD-10-CM

## 2019-02-25 DIAGNOSIS — M35.3 PMR (POLYMYALGIA RHEUMATICA) (H): ICD-10-CM

## 2019-02-25 LAB
ALT SERPL W P-5'-P-CCNC: 26 U/L (ref 0–70)
AST SERPL W P-5'-P-CCNC: 19 U/L (ref 0–45)
CRP SERPL-MCNC: 10.5 MG/L (ref 0–8)
ERYTHROCYTE [DISTWIDTH] IN BLOOD BY AUTOMATED COUNT: 17.6 % (ref 10–15)
ERYTHROCYTE [SEDIMENTATION RATE] IN BLOOD BY WESTERGREN METHOD: 21 MM/H (ref 0–20)
HCT VFR BLD AUTO: 42.2 % (ref 40–53)
HGB BLD-MCNC: 14.1 G/DL (ref 13.3–17.7)
MCH RBC QN AUTO: 30.2 PG (ref 26.5–33)
MCHC RBC AUTO-ENTMCNC: 33.4 G/DL (ref 31.5–36.5)
MCV RBC AUTO: 90 FL (ref 78–100)
PLATELET # BLD AUTO: 242 10E9/L (ref 150–450)
RBC # BLD AUTO: 4.67 10E12/L (ref 4.4–5.9)
WBC # BLD AUTO: 7.1 10E9/L (ref 4–11)

## 2019-02-25 PROCEDURE — 85027 COMPLETE CBC AUTOMATED: CPT | Mod: ZL | Performed by: INTERNAL MEDICINE

## 2019-02-25 PROCEDURE — 85652 RBC SED RATE AUTOMATED: CPT | Mod: ZL | Performed by: INTERNAL MEDICINE

## 2019-02-25 PROCEDURE — 36415 COLL VENOUS BLD VENIPUNCTURE: CPT | Mod: ZL | Performed by: INTERNAL MEDICINE

## 2019-02-25 PROCEDURE — 86140 C-REACTIVE PROTEIN: CPT | Mod: ZL | Performed by: INTERNAL MEDICINE

## 2019-02-25 PROCEDURE — 84450 TRANSFERASE (AST) (SGOT): CPT | Mod: ZL | Performed by: INTERNAL MEDICINE

## 2019-02-25 PROCEDURE — 84460 ALANINE AMINO (ALT) (SGPT): CPT | Mod: ZL | Performed by: INTERNAL MEDICINE

## 2019-04-18 NOTE — PROGRESS NOTES
[facility]  Rheumatology Clinic  Tee Goyal MD  2019     Name: Christian Akers  MRN: [id number]  Age: 89 year old  : 1930  Referring provider: Luana Martinez     Assessment and Plan:  #Polymyalgia Rheumatica  History of neck/shoulder pain, myalgia, weakness, elevated inflammatory markers. Bilateral temporal artery biopsy negative in 2016, but after 4-5 months of high dose prednisone. Patient currently on 4mg prednisone daily and 20 mg methotrexate weekly. Fatigue is mild and stable. No symptoms to suggest GCA. Blood work from 19 reveals normal LFTs and CBC. CRP was mildly elevated at 10.5. Sed rate was 21. Blood work from  reveals a slightly elevated white blood count. Red blood cell count was normal.     PMR remains completely suppressed symptomatically. In view of the patient's recent bout of pneumonia, I recommend reducing methotrexate to find a minimum effective dose for his rheumatic disorder. I suggest reducing methotrexate to 15mg weekly once again. While on the drug, he should undergo every 3 month LFT, CBC, and creatinine measurements. He or his daughters should contact our office should he experience recurrent proximal myalgias or morning stiffness.      #Calcium Pyrophosphate Deposition Disease  Chondrocalcinosis of wrists. History of acute pseudogout left knee in , left knee aspiration with 2100 WBC's, intracellular CPP. History of crystalline-sounding left ankle monoarthritis with corticosteroid injection 2017. Now stable. Has not required larger doses of steroids for flares since last visit.     #DJD of bilateral hips, moderate-severe  #DJD of left shoulder  Patient's pain is well controlled at present with OTC tylenol and home physical therapy exercises.      #Osteopenia  Last DEXA reportedly done in Summer 2018 however this is not in our system. On Fosamax 70mg weekly, plus Ca and Vitamin D.       #History of unprovoked DVT/PE in , treated  with 12 months anticoagulation    Orders:   - CRP inflammation  - Erythrocyte sedimentation rate auto  - predniSONE (DELTASONE) 1 MG tablet  Dispense: 200 tablet; Refill: 2  - methotrexate 2.5 MG tablet  Dispense: 84 tablet; Refill: 3          Follow-up: Return in about 6 months (around 10/19/2019).     HPI:   Christian Akers is a 89 year old male with a history of polyarticular inflammatory arthritis, pseudogout, and osteoarthritis and presents for follow up. I last saw him on 10/26/18 at which time he reported no symptoms suggestive of GCA. Symptoms were well managed with 6mg prednisone and 15mg methotrexate. DJD of his bilateral hips and left shoulder were well controlled with OTC tylenol and home physical therapy. Patient was admitted to the ED a few times since our last visit for pneumonia. He was treated with antibiotics and has had no recurrence of symptoms.     Patient's daughters were present during today's visit    Today, he reports no joint pain or swelling and no morning stiffness. He continues to have some left shoulder discomfort secondary to osteoarthritis. Despite his discomfort, he is able to perform most, if not all, daily activities without concern. He enjoys bowling in his free time. He is continued on 20mg methotrexate weekly and 4mg prednisone daily. He also takes extra strength tylenol as needed.      Review of Systems:   Pertinent items are noted in HPI or as below, remainder of complete ROS is negative.      No recent problems with hearing or vision. No swallowing problems.   No breathing difficulty, shortness of breath, coughing, or wheezing  No chest pain or palpitations  No heart burn, indigestion, abdominal pain, nausea, vomiting, diarrhea  No urination problems, no bloody, cloudy urine, no dysuria  No numbing, tingling, weakness  No headaches or confusion  No rashes. No easy bleeding or bruising.     Active Medications:     Current Outpatient Medications:      Acetaminophen (TYLENOL  PO), Take 500 mg by mouth every 6 hours as needed for mild pain or fever, Disp: , Rfl:      alendronate (FOSAMAX) 70 MG tablet, Take 1 tablet (70 mg) by mouth once a week Takes every Sunday, Disp: 12 tablet, Rfl: 3     AMLODIPINE BESYLATE PO, Take 5 mg by mouth daily, Disp: , Rfl:      ASPIRIN PO, Take 81 mg by mouth daily, Disp: , Rfl:      brimonidine (ALPHAGAN-P) 0.15 % ophthalmic solution, Place 1 drop into both eyes 2 times daily , Disp: , Rfl:      calcium-vitamin D (CALTRATE) 600-400 MG-UNIT per tablet, Take 1 tablet by mouth 2 times daily, Disp: 60 tablet, Rfl: 11     cholecalciferol (VITAMIN D3) 1000 UNIT tablet, Take 1 tablet (1,000 Units) by mouth daily, Disp: 30 tablet, Rfl: 11     FOLIC ACID PO, Take 800 mcg by mouth daily, Disp: , Rfl:      methotrexate 2.5 MG tablet CHEMO, Take 7 tablets (17.5 mg) by mouth once a week, Disp: 84 tablet, Rfl: 1     OMEPRAZOLE PO, Take 20 mg by mouth daily, Disp: , Rfl:      predniSONE (DELTASONE) 1 MG tablet, Take 4 tabs daily while tapering prednisone per Doctor instructions, Disp: 200 tablet, Rfl: 2     predniSONE (DELTASONE) 10 MG tablet, 40 mg daily for 5 days, then 20 mg daily for 5 days then off (Patient taking differently: 6 mg 40 mg daily for 5 days, then 20 mg daily for 5 days then off), Disp: 30 tablet, Rfl: 1     predniSONE (DELTASONE) 5 MG tablet, Take 1 tablet (5 mg) by mouth daily, Disp: 90 tablet, Rfl: 1     tamsulosin (FLOMAX) 0.4 MG capsule, Take 0.8 mg by mouth daily, Disp: , Rfl:       Allergies:   Cialis  [tadalafil]      Past Medical History:  Anemia  HTN  HLD  BPH with negative prostate biopsies in 2005  RBBB  Unprovoked PE 2/2015, treated with anticoagulation for 12 months  Seronegative inflammatory arthritis  OA  UTI in 2004 with hemorrhagic cystitis in 2008  Borderline glaucoma  Acute pseudogout of left knee 11/2015   Osteopenia       Past Surgical History:  Left inguinal hernia repair  Cataract repair  Left TKA 4/2016  Bilateral temporal artery  "biopsies 11/2016-negative      Family History:   Father - cancer  Mother - cancer  Brother - prostate cancer  Sister - cancer       Social History:   Former smoker  No smokeless tobacco use  Occasional alcohol use, 1 drink/week       Physical Exam:   /72   Pulse 77   Temp 98.3  F (36.8  C) (Oral)   Ht 1.727 m (5' 8\")   Wt 73.7 kg (162 lb 6.4 oz)   SpO2 97%   BMI 24.69 kg/m     Wt Readings from Last 4 Encounters:   04/19/19 73.7 kg (162 lb 6.4 oz)   10/26/18 73.1 kg (161 lb 1.6 oz)   04/27/18 67.9 kg (149 lb 11.2 oz)   11/17/17 70.9 kg (156 lb 6.4 oz)     Constitutional: Well-developed, appearing stated age; cooperative  Eyes: Normal EOM, PERRLA, vision, conjunctiva, sclera  ENT: Normal external ears, nose, hearing, lips, teeth, gums, throat. No mucous membrane lesions, normal saliva pool  Neck: No mass or thyroid enlargement  Resp: Lungs clear to auscultation, nl to palpation  CV: RRR, no murmurs, rubs or gallops, no edema  GI: No ABD mass or tenderness, no HSM  : Not tested  Lymph: No cervical, supraclavicular, inguinal or epitrochlear nodes  MS: The TMJ, neck, elbow, wrist, MCP, spine, hip, knee, ankle, and foot MTP/IP joints were examined and found normal. No active synovitis or altered joint anatomy. Full joint ROM. Normal  strength. No dactylitis,  tenosynovitis, enthesopathy. Angulation changes in PIPs and DIPs. Limited left shoulder flexion.   Skin: No nail pitting, alopecia, rash, nodules or lesions  Neuro: Normal cranial nerves, strength, sensation, DTRs.   Psych: Normal judgement, orientation, memory, affect.       Laboratory:   RHEUM RESULTS Latest Ref Rng & Units 10/26/2018 12/27/2018 2/25/2019   SED RATE 0 - 20 mm/h 9 13 21(H)   CRP, INFLAMMATION 0.0 - 8.0 mg/L 11.4(H) 3.3 10.5(H)   AST 0 - 45 U/L 21 19 19   ALT 0 - 70 U/L 27 20 26   ALBUMIN 3.4 - 5.0 g/dL - - -   WBC 4.0 - 11.0 10e9/L 10.2 9.1 7.1   RBC 4.4 - 5.9 10e12/L 4.79 4.75 4.67   HGB 13.3 - 17.7 g/dL 14.0 14.3 14.1   HCT 40.0 " - 53.0 % 44.2 42.4 42.2   MCV 78 - 100 fl 92 89 90   MCHC 31.5 - 36.5 g/dL 31.7 33.7 33.4   RDW 10.0 - 15.0 % 17.3(H) 16.9(H) 17.6(H)    - 450 10e9/L 230 256 242   CREATININE 0.66 - 1.25 mg/dL - - -   GFR ESTIMATE, IF BLACK >60 mL/min/1.7m2 - - -   GFR ESTIMATE >60 mL/min/1.7m2 - - -    - 1,620 mg/dL - - -   IGA 70 - 380 mg/dL - - -   IGM 60 - 265 mg/dL - - -        Albumin Fraction   Date Value Ref Range Status   02/17/2017 3.8 3.7 - 5.1 g/dL Final     Alpha 2 Fraction   Date Value Ref Range Status   02/17/2017 0.9 0.5 - 0.9 g/dL Final     Beta Fraction   Date Value Ref Range Status   02/17/2017 0.6 0.6 - 1.0 g/dL Final     Gamma Fraction   Date Value Ref Range Status   02/17/2017 0.6 (L) 0.7 - 1.6 g/dL Final     Monoclonal Peak   Date Value Ref Range Status   02/17/2017 0.1 (H) 0.0 g/dL Final     ELP Interpretation:   Date Value Ref Range Status   02/17/2017   Final    Two very small monoclonal proteins (each about 0.1 g/dL or less) seen in the   gamma fraction.  See immunofixation report on same specimen. Pathologic   significance requires clinical correlation.  JOYCE Monroy M.D., Ph.D.,   Pathologist ().         Monoclonal Peak Urine   Date Value Ref Range Status   02/17/2017 0.0 0% % Final     Immunofixation ELP   Date Value Ref Range Status   02/17/2017   Final    (Note)  Monoclonal IgG immunoglobulin of kappa light chain type. Very  small monoclonal IgG immunoglobulin of lambda light chain type.  Monoclonal antibody therapeutics (e.g. Daratumumab) may appear as  monoclonal proteins on serum electrophoresis and immunofixation.  Results should be interpreted with caution if the patient is  receiving monoclonal antibody therapy. Pathological significance  requires clinical correlation.  JOYCE Monroy M.D., Ph.D.,  Pathologist ([phone])         IGG   Date Value Ref Range Status   02/17/2017 702 695 - 1,620 mg/dL Final     IGA   Date Value Ref Range Status   02/17/2017 119 70 -  380 mg/dL Final     IGM   Date Value Ref Range Status   02/17/2017 14 (L) 60 - 265 mg/dL Final              Scribe Disclosure:  I, aB Toussaint, am serving as a scribe to document services personally performed by Tee Goyal MD at this visit, based upon the provider's statements to me. All documentation has been reviewed by the aforementioned provider prior to being entered into the official medical record.

## 2019-04-19 ENCOUNTER — OFFICE VISIT (OUTPATIENT)
Dept: RHEUMATOLOGY | Facility: CLINIC | Age: 84
End: 2019-04-19
Attending: INTERNAL MEDICINE
Payer: MEDICARE

## 2019-04-19 VITALS
BODY MASS INDEX: 24.61 KG/M2 | OXYGEN SATURATION: 97 % | HEIGHT: 68 IN | SYSTOLIC BLOOD PRESSURE: 163 MMHG | WEIGHT: 162.4 LBS | HEART RATE: 77 BPM | DIASTOLIC BLOOD PRESSURE: 72 MMHG | TEMPERATURE: 98.3 F

## 2019-04-19 DIAGNOSIS — Z87.39 HX OF CALCIUM PYROPHOSPHATE DEPOSITION DISEASE (CPPD): ICD-10-CM

## 2019-04-19 DIAGNOSIS — Z51.81 ENCOUNTER FOR THERAPEUTIC DRUG MONITORING: ICD-10-CM

## 2019-04-19 DIAGNOSIS — M35.3 PMR (POLYMYALGIA RHEUMATICA) (H): ICD-10-CM

## 2019-04-19 DIAGNOSIS — M72.2 PLANTAR FASCIITIS: ICD-10-CM

## 2019-04-19 DIAGNOSIS — M35.3 POLYMYALGIA RHEUMATICA (H): ICD-10-CM

## 2019-04-19 DIAGNOSIS — D64.9 ANEMIA, UNSPECIFIED TYPE: ICD-10-CM

## 2019-04-19 DIAGNOSIS — D64.9 ANEMIA: ICD-10-CM

## 2019-04-19 DIAGNOSIS — Z87.39 HISTORY OF CALCIUM PYROPHOSPHATE DEPOSITION DISEASE (CPPD): ICD-10-CM

## 2019-04-19 DIAGNOSIS — M35.3 PMR (POLYMYALGIA RHEUMATICA) (H): Primary | ICD-10-CM

## 2019-04-19 LAB
ALT SERPL W P-5'-P-CCNC: 21 U/L (ref 0–70)
AST SERPL W P-5'-P-CCNC: 17 U/L (ref 0–45)
CRP SERPL-MCNC: 13 MG/L (ref 0–8)
ERYTHROCYTE [DISTWIDTH] IN BLOOD BY AUTOMATED COUNT: 16.2 % (ref 10–15)
ERYTHROCYTE [SEDIMENTATION RATE] IN BLOOD BY WESTERGREN METHOD: 17 MM/H (ref 0–20)
HCT VFR BLD AUTO: 40.4 % (ref 40–53)
HGB BLD-MCNC: 13.2 G/DL (ref 13.3–17.7)
MCH RBC QN AUTO: 30.2 PG (ref 26.5–33)
MCHC RBC AUTO-ENTMCNC: 32.7 G/DL (ref 31.5–36.5)
MCV RBC AUTO: 92 FL (ref 78–100)
PLATELET # BLD AUTO: 189 10E9/L (ref 150–450)
RBC # BLD AUTO: 4.37 10E12/L (ref 4.4–5.9)
WBC # BLD AUTO: 3.7 10E9/L (ref 4–11)

## 2019-04-19 PROCEDURE — 85027 COMPLETE CBC AUTOMATED: CPT | Performed by: INTERNAL MEDICINE

## 2019-04-19 PROCEDURE — 86140 C-REACTIVE PROTEIN: CPT | Performed by: INTERNAL MEDICINE

## 2019-04-19 PROCEDURE — 84450 TRANSFERASE (AST) (SGOT): CPT | Performed by: INTERNAL MEDICINE

## 2019-04-19 PROCEDURE — 84460 ALANINE AMINO (ALT) (SGPT): CPT | Performed by: INTERNAL MEDICINE

## 2019-04-19 PROCEDURE — 36415 COLL VENOUS BLD VENIPUNCTURE: CPT | Performed by: INTERNAL MEDICINE

## 2019-04-19 PROCEDURE — G0463 HOSPITAL OUTPT CLINIC VISIT: HCPCS | Mod: ZF

## 2019-04-19 PROCEDURE — 85652 RBC SED RATE AUTOMATED: CPT | Performed by: INTERNAL MEDICINE

## 2019-04-19 RX ORDER — PREDNISONE 1 MG/1
TABLET ORAL
Qty: 200 TABLET | Refills: 2 | Status: SHIPPED | OUTPATIENT
Start: 2019-04-19 | End: 2019-08-16

## 2019-04-19 RX ORDER — AZITHROMYCIN 250 MG/1
TABLET, FILM COATED ORAL
Refills: 0 | COMMUNITY
Start: 2019-04-13 | End: 2020-01-08

## 2019-04-19 ASSESSMENT — PAIN SCALES - GENERAL: PAINLEVEL: NO PAIN (0)

## 2019-04-19 ASSESSMENT — MIFFLIN-ST. JEOR: SCORE: 1376.14

## 2019-04-19 NOTE — LETTER
2019       RE: Christian Akers  [address]  [facility] [address]     Dear Colleague,    Thank you for referring your patient, Christian Akers, to the [facility] RHEUMATOLOGY at [facility]. Please see a copy of my visit note below.    [facility]  Rheumatology Clinic  Tee Goyal MD  2019     Name: Christian Akers  MRN: [id number]  Age: 89 year old  : 1930  Referring provider: Luana Martinez     Assessment and Plan:  #Polymyalgia Rheumatica  History of neck/shoulder pain, myalgia, weakness, elevated inflammatory markers. Bilateral temporal artery biopsy negative in 2016, but after 4-5 months of high dose prednisone. Patient currently on 4mg prednisone daily and  20 mg methotrexate weekly. Fatigue is mild and stable. No symptoms to suggest GCA. Blood work from 19 reveals normal LFTs and CBC. CRP was mildly elevated at 10.5. Sed rate was 21. Blood work from  reveals a slightly elevated white blood count. Red blood cell count was normal.     PMR remains completely suppressed symptomatically. In view of the patient's recent bout of pneumonia, I recommend reducing methotrexate to find a minimum effective dose for his rheumatic disorder. I suggest reducing methotrexate to 15mg weekly once again. While on the drug, he should undergo every 3 month LFT, CBC, and creatinine measurements. He or his daughters should contact our office should he experience recurrent proximal myalgias or morning stiffness.      #Calcium Pyrophosphate Deposition Disease  Chondrocalcinosis of wrists. History of acute pseudogout left knee in , left knee aspiration with 2100 WBC's, intracellular CPP. History of crystalline-sounding left ankle monoarthritis with corticosteroid injection 2017. Now stable. Has not required larger doses of steroids for flares since last visit.     #DJD of bilateral hips, moderate-severe  #DJD of left shoulder  Patient's pain  is well controlled at present with OTC tylenol and home physical therapy exercises.      #Osteopenia  Last DEXA reportedly done in Summer 2018 however this is not in our system. On Fosamax 70mg weekly, plus Ca and Vitamin D.       #History of unprovoked DVT/PE in 2015, treated with 12 months anticoagulation    Orders:   - CRP inflammation  - Erythrocyte sedimentation rate auto  - predniSONE (DELTASONE) 1 MG tablet  Dispense: 200 tablet; Refill: 2  - methotrexate 2.5 MG tablet  Dispense: 84 tablet; Refill: 3          Follow-up: Return in about 6 months (around 10/19/2019).     HPI:   Christian Akers is a 89 year old male with a history of polyarticular inflammatory arthritis, pseudogout, and osteoarthritis and presents for follow up. I last saw him on 10/26/18 at which time he reported no symptoms suggestive of GCA. Symptoms were well managed with 6mg prednisone and 15mg methotrexate. DJD of his bilateral hips and left shoulder were well controlled with OTC tylenol and home physical therapy. Patient was admitted to the ED a few times since our last visit for pneumonia. He was treated with antibiotics and has had no recurrence of symptoms.     Patient's daughters were present during today's visit    Today, he reports no joint pain or swelling and no morning stiffness. He continues to have some left shoulder discomfort secondary to osteoarthritis. Despite his discomfort, he is able to perform most, if not all, daily activities without concern. He enjoys bowling in his free time. He is continued on 20mg methotrexate weekly and 4mg prednisone daily. He also takes extra strength tylenol as needed.      Review of Systems:   Pertinent items are noted in HPI or as below, remainder of complete ROS is negative.      No recent problems with hearing or vision. No swallowing problems.   No breathing difficulty, shortness of breath, coughing, or wheezing  No chest pain or palpitations  No heart burn, indigestion, abdominal pain,  nausea, vomiting, diarrhea  No urination problems, no bloody, cloudy urine, no dysuria  No numbing, tingling, weakness  No headaches or confusion  No rashes. No easy bleeding or bruising.     Active Medications:     Current Outpatient Medications:      Acetaminophen (TYLENOL PO), Take 500 mg by mouth every 6 hours as needed for mild pain or fever, Disp: , Rfl:      alendronate (FOSAMAX) 70 MG tablet, Take 1 tablet (70 mg) by mouth once a week Takes every Sunday, Disp: 12 tablet, Rfl: 3     AMLODIPINE BESYLATE PO, Take 5 mg by mouth daily, Disp: , Rfl:      ASPIRIN PO, Take 81 mg by mouth daily, Disp: , Rfl:      brimonidine (ALPHAGAN-P) 0.15 % ophthalmic solution, Place 1 drop into both eyes 2 times daily , Disp: , Rfl:      calcium-vitamin D (CALTRATE) 600-400 MG-UNIT per tablet, Take 1 tablet by mouth 2 times daily, Disp: 60 tablet, Rfl: 11     cholecalciferol (VITAMIN D3) 1000 UNIT tablet, Take 1 tablet (1,000 Units) by mouth daily, Disp: 30 tablet, Rfl: 11     FOLIC ACID PO, Take 800 mcg by mouth daily, Disp: , Rfl:      methotrexate 2.5 MG tablet CHEMO, Take 7 tablets (17.5 mg) by mouth once a week, Disp: 84 tablet, Rfl: 1     OMEPRAZOLE PO, Take 20 mg by mouth daily, Disp: , Rfl:      predniSONE (DELTASONE) 1 MG tablet, Take 4 tabs daily while tapering prednisone per Doctor instructions, Disp: 200 tablet, Rfl: 2     predniSONE (DELTASONE) 10 MG tablet, 40 mg daily for 5 days, then 20 mg daily for 5 days then off (Patient taking differently: 6 mg 40 mg daily for 5 days, then 20 mg daily for 5 days then off), Disp: 30 tablet, Rfl: 1     predniSONE (DELTASONE) 5 MG tablet, Take 1 tablet (5 mg) by mouth daily, Disp: 90 tablet, Rfl: 1     tamsulosin (FLOMAX) 0.4 MG capsule, Take 0.8 mg by mouth daily, Disp: , Rfl:       Allergies:   Cialis  [tadalafil]      Past Medical History:  Anemia  HTN  HLD  BPH with negative prostate biopsies in 2005  RBBB  Unprovoked PE 2/2015, treated with anticoagulation for 12  "months  Seronegative inflammatory arthritis  OA  UTI in 2004 with hemorrhagic cystitis in 2008  Borderline glaucoma  Acute pseudogout of left knee 11/2015   Osteopenia       Past Surgical History:  Left inguinal hernia repair  Cataract repair  Left TKA 4/2016  Bilateral temporal artery biopsies 11/2016-negative      Family History:   Father - cancer  Mother - cancer  Brother - prostate cancer  Sister - cancer       Social History:   Former smoker  No smokeless tobacco use  Occasional alcohol use, 1 drink/week       Physical Exam:   /72   Pulse 77   Temp 98.3  F (36.8  C) (Oral)   Ht 1.727 m (5' 8\")   Wt 73.7 kg (162 lb 6.4 oz)   SpO2 97%   BMI 24.69 kg/m      Wt Readings from Last 4 Encounters:   04/19/19 73.7 kg (162 lb 6.4 oz)   10/26/18 73.1 kg (161 lb 1.6 oz)   04/27/18 67.9 kg (149 lb 11.2 oz)   11/17/17 70.9 kg (156 lb 6.4 oz)     Constitutional: Well-developed, appearing stated age; cooperative  Eyes: Normal EOM, PERRLA, vision, conjunctiva, sclera  ENT: Normal external ears, nose, hearing, lips, teeth, gums, throat. No mucous membrane lesions, normal saliva pool  Neck: No mass or thyroid enlargement  Resp: Lungs clear to auscultation, nl to palpation  CV: RRR, no murmurs, rubs or gallops, no edema  GI: No ABD mass or tenderness, no HSM  : Not tested  Lymph: No cervical, supraclavicular, inguinal or epitrochlear nodes  MS: The TMJ, neck, elbow, wrist, MCP, spine, hip, knee, ankle, and foot MTP/IP joints were examined and found normal. No active synovitis or altered joint anatomy. Full joint ROM. Normal  strength. No dactylitis,  tenosynovitis, enthesopathy. Angulation changes in PIPs and DIPs. Limited left shoulder flexion.   Skin: No nail pitting, alopecia, rash, nodules or lesions  Neuro: Normal cranial nerves, strength, sensation, DTRs.   Psych: Normal judgement, orientation, memory, affect.       Laboratory:   RHEUM RESULTS Latest Ref Rng & Units 10/26/2018 12/27/2018 2/25/2019   SED " RATE 0 - 20 mm/h 9 13 21(H)   CRP, INFLAMMATION 0.0 - 8.0 mg/L 11.4(H) 3.3 10.5(H)   AST 0 - 45 U/L 21 19 19   ALT 0 - 70 U/L 27 20 26   ALBUMIN 3.4 - 5.0 g/dL - - -   WBC 4.0 - 11.0 10e9/L 10.2 9.1 7.1   RBC 4.4 - 5.9 10e12/L 4.79 4.75 4.67   HGB 13.3 - 17.7 g/dL 14.0 14.3 14.1   HCT 40.0 - 53.0 % 44.2 42.4 42.2   MCV 78 - 100 fl 92 89 90   MCHC 31.5 - 36.5 g/dL 31.7 33.7 33.4   RDW 10.0 - 15.0 % 17.3(H) 16.9(H) 17.6(H)    - 450 10e9/L 230 256 242   CREATININE 0.66 - 1.25 mg/dL - - -   GFR ESTIMATE, IF BLACK >60 mL/min/1.7m2 - - -   GFR ESTIMATE >60 mL/min/1.7m2 - - -    - 1,620 mg/dL - - -   IGA 70 - 380 mg/dL - - -   IGM 60 - 265 mg/dL - - -        Albumin Fraction   Date Value Ref Range Status   02/17/2017 3.8 3.7 - 5.1 g/dL Final     Alpha 2 Fraction   Date Value Ref Range Status   02/17/2017 0.9 0.5 - 0.9 g/dL Final     Beta Fraction   Date Value Ref Range Status   02/17/2017 0.6 0.6 - 1.0 g/dL Final     Gamma Fraction   Date Value Ref Range Status   02/17/2017 0.6 (L) 0.7 - 1.6 g/dL Final     Monoclonal Peak   Date Value Ref Range Status   02/17/2017 0.1 (H) 0.0 g/dL Final     ELP Interpretation:   Date Value Ref Range Status   02/17/2017   Final    Two very small monoclonal proteins (each about 0.1 g/dL or less) seen in the   gamma fraction.  See immunofixation report on same specimen. Pathologic   significance requires clinical correlation.  JOYCE Monroy M.D., Ph.D.,   Pathologist ().         Monoclonal Peak Urine   Date Value Ref Range Status   02/17/2017 0.0 0% % Final     Immunofixation ELP   Date Value Ref Range Status   02/17/2017   Final    (Note)  Monoclonal IgG immunoglobulin of kappa light chain type. Very  small monoclonal IgG immunoglobulin of lambda light chain type.  Monoclonal antibody therapeutics (e.g. Daratumumab) may appear as  monoclonal proteins on serum electrophoresis and immunofixation.  Results should be interpreted with caution if the patient  is  receiving monoclonal antibody therapy. Pathological significance  requires clinical correlation.  JOYCE Monroy M.D., Ph.D.,  Pathologist ([phone])         IGG   Date Value Ref Range Status   02/17/2017 702 695 - 1,620 mg/dL Final     IGA   Date Value Ref Range Status   02/17/2017 119 70 - 380 mg/dL Final     IGM   Date Value Ref Range Status   02/17/2017 14 (L) 60 - 265 mg/dL Final     Scribe Disclosure:  I, aB Toussaint, am serving as a scribe to document services personally performed by Tee Goyal MD at this visit, based upon the provider's statements to me. All documentation has been reviewed by the aforementioned provider prior to being entered into the official medical record.      Again, thank you for allowing me to participate in the care of your patient.      Sincerely,    Tee Goyal MD

## 2019-04-19 NOTE — LETTER
2019      [facility]: Christian Akers  [address] [facility] [facility] [address]       [facility]  Rheumatology Clinic  Tee Goyal MD  2019     Name: Christian Akers  MRN: [id number]  Age: 89 year old  : 1930  Referring provider: Luana Martinez     Assessment and Plan:  #Polymyalgia Rheumatica  History of neck/shoulder pain, myalgia, weakness, elevated inflammatory markers. Bilateral temporal artery biopsy negative in 2016, but after 4-5 months of high dose prednisone. Patient currently on 4mg prednisone daily and  20 mg methotrexate weekly. Fatigue is mild and stable. No symptoms to suggest GCA. Blood work from 19 reveals normal LFTs and CBC. CRP was mildly elevated at 10.5. Sed rate was 21. Blood work from  reveals a slightly elevated white blood count. Red blood cell count was normal.     PMR remains completely suppressed symptomatically. In view of the patient's recent bout of pneumonia, I recommend reducing methotrexate to find a minimum effective dose for his rheumatic disorder. I suggest reducing methotrexate to 15mg weekly once again. While on the drug, he should undergo every 3 month LFT, CBC, and creatinine measurements. He or his daughters should contact our office should he experience recurrent proximal myalgias or morning stiffness.      #Calcium Pyrophosphate Deposition Disease  Chondrocalcinosis of wrists. History of acute pseudogout left knee in , left knee aspiration with 2100 WBC's, intracellular CPP. History of crystalline-sounding left ankle monoarthritis with corticosteroid injection 2017. Now stable. Has not required larger doses of steroids for flares since last visit.     #DJD of bilateral hips, moderate-severe  #DJD of left shoulder  Patient's pain is well controlled at present with OTC tylenol and home physical therapy exercises.      #Osteopenia  Last DEXA reportedly done in Summer 2018 however this is not in our system. On Fosamax 70mg  weekly, plus Ca and Vitamin D.       #History of unprovoked DVT/PE in 2015, treated with 12 months anticoagulation    Orders:   - CRP inflammation  - Erythrocyte sedimentation rate auto  - predniSONE (DELTASONE) 1 MG tablet  Dispense: 200 tablet; Refill: 2  - methotrexate 2.5 MG tablet  Dispense: 84 tablet; Refill: 3          Follow-up: Return in about 6 months (around 10/19/2019).     HPI:   Christian Akers is a 89 year old male with a history of polyarticular inflammatory arthritis, pseudogout, and osteoarthritis and presents for follow up. I last saw him on 10/26/18 at which time he reported no symptoms suggestive of GCA. Symptoms were well managed with 6mg prednisone and 15mg methotrexate. DJD of his bilateral hips and left shoulder were well controlled with OTC tylenol and home physical therapy. Patient was admitted to the ED a few times since our last visit for pneumonia. He was treated with antibiotics and has had no recurrence of symptoms.     Patient's daughters were present during today's visit    Today, he reports no joint pain or swelling and no morning stiffness. He continues to have some left shoulder discomfort secondary to osteoarthritis. Despite his discomfort, he is able to perform most, if not all, daily activities without concern. He enjoys bowling in his free time. He is continued on 20mg methotrexate weekly and 4mg prednisone daily. He also takes extra strength tylenol as needed.      Review of Systems:   Pertinent items are noted in HPI or as below, remainder of complete ROS is negative.      No recent problems with hearing or vision. No swallowing problems.   No breathing difficulty, shortness of breath, coughing, or wheezing  No chest pain or palpitations  No heart burn, indigestion, abdominal pain, nausea, vomiting, diarrhea  No urination problems, no bloody, cloudy urine, no dysuria  No numbing, tingling, weakness  No headaches or confusion  No rashes. No easy bleeding or bruising.      Active Medications:     Current Outpatient Medications:      Acetaminophen (TYLENOL PO), Take 500 mg by mouth every 6 hours as needed for mild pain or fever, Disp: , Rfl:      alendronate (FOSAMAX) 70 MG tablet, Take 1 tablet (70 mg) by mouth once a week Takes every Sunday, Disp: 12 tablet, Rfl: 3     AMLODIPINE BESYLATE PO, Take 5 mg by mouth daily, Disp: , Rfl:      ASPIRIN PO, Take 81 mg by mouth daily, Disp: , Rfl:      brimonidine (ALPHAGAN-P) 0.15 % ophthalmic solution, Place 1 drop into both eyes 2 times daily , Disp: , Rfl:      calcium-vitamin D (CALTRATE) 600-400 MG-UNIT per tablet, Take 1 tablet by mouth 2 times daily, Disp: 60 tablet, Rfl: 11     cholecalciferol (VITAMIN D3) 1000 UNIT tablet, Take 1 tablet (1,000 Units) by mouth daily, Disp: 30 tablet, Rfl: 11     FOLIC ACID PO, Take 800 mcg by mouth daily, Disp: , Rfl:      methotrexate 2.5 MG tablet CHEMO, Take 7 tablets (17.5 mg) by mouth once a week, Disp: 84 tablet, Rfl: 1     OMEPRAZOLE PO, Take 20 mg by mouth daily, Disp: , Rfl:      predniSONE (DELTASONE) 1 MG tablet, Take 4 tabs daily while tapering prednisone per Doctor instructions, Disp: 200 tablet, Rfl: 2     predniSONE (DELTASONE) 10 MG tablet, 40 mg daily for 5 days, then 20 mg daily for 5 days then off (Patient taking differently: 6 mg 40 mg daily for 5 days, then 20 mg daily for 5 days then off), Disp: 30 tablet, Rfl: 1     predniSONE (DELTASONE) 5 MG tablet, Take 1 tablet (5 mg) by mouth daily, Disp: 90 tablet, Rfl: 1     tamsulosin (FLOMAX) 0.4 MG capsule, Take 0.8 mg by mouth daily, Disp: , Rfl:       Allergies:   Cialis  [tadalafil]      Past Medical History:  Anemia  HTN  HLD  BPH with negative prostate biopsies in 2005  RBBB  Unprovoked PE 2/2015, treated with anticoagulation for 12 months  Seronegative inflammatory arthritis  OA  UTI in 2004 with hemorrhagic cystitis in 2008  Borderline glaucoma  Acute pseudogout of left knee 11/2015   Osteopenia       Past Surgical  "History:  Left inguinal hernia repair  Cataract repair  Left TKA 4/2016  Bilateral temporal artery biopsies 11/2016-negative      Family History:   Father - cancer  Mother - cancer  Brother - prostate cancer  Sister - cancer       Social History:   Former smoker  No smokeless tobacco use  Occasional alcohol use, 1 drink/week       Physical Exam:   /72   Pulse 77   Temp 98.3  F (36.8  C) (Oral)   Ht 1.727 m (5' 8\")   Wt 73.7 kg (162 lb 6.4 oz)   SpO2 97%   BMI 24.69 kg/m      Wt Readings from Last 4 Encounters:   04/19/19 73.7 kg (162 lb 6.4 oz)   10/26/18 73.1 kg (161 lb 1.6 oz)   04/27/18 67.9 kg (149 lb 11.2 oz)   11/17/17 70.9 kg (156 lb 6.4 oz)     Constitutional: Well-developed, appearing stated age; cooperative  Eyes: Normal EOM, PERRLA, vision, conjunctiva, sclera  ENT: Normal external ears, nose, hearing, lips, teeth, gums, throat. No mucous membrane lesions, normal saliva pool  Neck: No mass or thyroid enlargement  Resp: Lungs clear to auscultation, nl to palpation  CV: RRR, no murmurs, rubs or gallops, no edema  GI: No ABD mass or tenderness, no HSM  : Not tested  Lymph: No cervical, supraclavicular, inguinal or epitrochlear nodes  MS: The TMJ, neck, elbow, wrist, MCP, spine, hip, knee, ankle, and foot MTP/IP joints were examined and found normal. No active synovitis or altered joint anatomy. Full joint ROM. Normal  strength. No dactylitis,  tenosynovitis, enthesopathy. Angulation changes in PIPs and DIPs. Limited left shoulder flexion.   Skin: No nail pitting, alopecia, rash, nodules or lesions  Neuro: Normal cranial nerves, strength, sensation, DTRs.   Psych: Normal judgement, orientation, memory, affect.       Laboratory:   RHEUM RESULTS Latest Ref Rng & Units 10/26/2018 12/27/2018 2/25/2019   SED RATE 0 - 20 mm/h 9 13 21(H)   CRP, INFLAMMATION 0.0 - 8.0 mg/L 11.4(H) 3.3 10.5(H)   AST 0 - 45 U/L 21 19 19   ALT 0 - 70 U/L 27 20 26   ALBUMIN 3.4 - 5.0 g/dL - - -   WBC 4.0 - 11.0 10e9/L " 10.2 9.1 7.1   RBC 4.4 - 5.9 10e12/L 4.79 4.75 4.67   HGB 13.3 - 17.7 g/dL 14.0 14.3 14.1   HCT 40.0 - 53.0 % 44.2 42.4 42.2   MCV 78 - 100 fl 92 89 90   MCHC 31.5 - 36.5 g/dL 31.7 33.7 33.4   RDW 10.0 - 15.0 % 17.3(H) 16.9(H) 17.6(H)    - 450 10e9/L 230 256 242   CREATININE 0.66 - 1.25 mg/dL - - -   GFR ESTIMATE, IF BLACK >60 mL/min/1.7m2 - - -   GFR ESTIMATE >60 mL/min/1.7m2 - - -    - 1,620 mg/dL - - -   IGA 70 - 380 mg/dL - - -   IGM 60 - 265 mg/dL - - -        Albumin Fraction   Date Value Ref Range Status   02/17/2017 3.8 3.7 - 5.1 g/dL Final     Alpha 2 Fraction   Date Value Ref Range Status   02/17/2017 0.9 0.5 - 0.9 g/dL Final     Beta Fraction   Date Value Ref Range Status   02/17/2017 0.6 0.6 - 1.0 g/dL Final     Gamma Fraction   Date Value Ref Range Status   02/17/2017 0.6 (L) 0.7 - 1.6 g/dL Final     Monoclonal Peak   Date Value Ref Range Status   02/17/2017 0.1 (H) 0.0 g/dL Final     ELP Interpretation:   Date Value Ref Range Status   02/17/2017   Final    Two very small monoclonal proteins (each about 0.1 g/dL or less) seen in the   gamma fraction.  See immunofixation report on same specimen. Pathologic   significance requires clinical correlation.  JOYCE Monroy M.D., Ph.D.,   Pathologist ().         Monoclonal Peak Urine   Date Value Ref Range Status   02/17/2017 0.0 0% % Final     Immunofixation ELP   Date Value Ref Range Status   02/17/2017   Final    (Note)  Monoclonal IgG immunoglobulin of kappa light chain type. Very  small monoclonal IgG immunoglobulin of lambda light chain type.  Monoclonal antibody therapeutics (e.g. Daratumumab) may appear as  monoclonal proteins on serum electrophoresis and immunofixation.  Results should be interpreted with caution if the patient is  receiving monoclonal antibody therapy. Pathological significance  requires clinical correlation.  JOYCE Monroy M.D., Ph.D.,  Pathologist ([phone])         IGG   Date Value Ref Range Status    02/17/2017 702 695 - 1,620 mg/dL Final     IGA   Date Value Ref Range Status   02/17/2017 119 70 - 380 mg/dL Final     IGM   Date Value Ref Range Status   02/17/2017 14 (L) 60 - 265 mg/dL Final          Scribe Disclosure:  I, Ba Toussaint, am serving as a scribe to document services personally performed by Tee Goyal MD at this visit, based upon the provider's statements to me. All documentation has been reviewed by the aforementioned provider prior to being entered into the official medical record.      Tee Goyal MD

## 2019-04-19 NOTE — NURSING NOTE
"Chief Complaint   Patient presents with     RECHECK     PMR (polymyalgia rheumatica)      /72   Pulse 77   Temp 98.3  F (36.8  C) (Oral)   Ht 1.727 m (5' 8\")   Wt 73.7 kg (162 lb 6.4 oz)   SpO2 97%   BMI 24.69 kg/m    Jacklyn Londono MA    "

## 2019-04-19 NOTE — LETTER
2019      [facility]: Christian Akers  [address] [facility] [facility] [address]       [facility]  Rheumatology Clinic  Tee Goyal MD  2019     Name: Christian Akers  MRN: [id number]  Age: 89 year old  : 1930  Referring provider: Luana Martinez     Assessment and Plan:  #Polymyalgia Rheumatica  History of neck/shoulder pain, myalgia, weakness, elevated inflammatory markers. Bilateral temporal artery biopsy negative in 2016, but after 4-5 months of high dose prednisone. Patient currently on 4mg prednisone daily and  20 mg methotrexate weekly. Fatigue is mild and stable. No symptoms to suggest GCA. Blood work from 19 reveals normal LFTs and CBC. CRP was mildly elevated at 10.5. Sed rate was 21. Blood work from  reveals a slightly elevated white blood count. Red blood cell count was normal.     PMR remains completely suppressed symptomatically. In view of the patient's recent bout of pneumonia, I recommend reducing methotrexate to find a minimum effective dose for his rheumatic disorder. I suggest reducing methotrexate to 15mg weekly once again. While on the drug, he should undergo every 3 month LFT, CBC, and creatinine measurements. He or his daughters should contact our office should he experience recurrent proximal myalgias or morning stiffness.      #Calcium Pyrophosphate Deposition Disease  Chondrocalcinosis of wrists. History of acute pseudogout left knee in , left knee aspiration with 2100 WBC's, intracellular CPP. History of crystalline-sounding left ankle monoarthritis with corticosteroid injection 2017. Now stable. Has not required larger doses of steroids for flares since last visit.     #DJD of bilateral hips, moderate-severe  #DJD of left shoulder  Patient's pain is well controlled at present with OTC tylenol and home physical therapy exercises.      #Osteopenia  Last DEXA reportedly done in Summer 2018 however this is not in our system. On Fosamax 70mg  weekly, plus Ca and Vitamin D.       #History of unprovoked DVT/PE in 2015, treated with 12 months anticoagulation    Orders:   - CRP inflammation  - Erythrocyte sedimentation rate auto  - predniSONE (DELTASONE) 1 MG tablet  Dispense: 200 tablet; Refill: 2  - methotrexate 2.5 MG tablet  Dispense: 84 tablet; Refill: 3          Follow-up: Return in about 6 months (around 10/19/2019).     HPI:   Christian Akers is a 89 year old male with a history of polyarticular inflammatory arthritis, pseudogout, and osteoarthritis and presents for follow up. I last saw him on 10/26/18 at which time he reported no symptoms suggestive of GCA. Symptoms were well managed with 6mg prednisone and 15mg methotrexate. DJD of his bilateral hips and left shoulder were well controlled with OTC tylenol and home physical therapy. Patient was admitted to the ED a few times since our last visit for pneumonia. He was treated with antibiotics and has had no recurrence of symptoms.     Patient's daughters were present during today's visit    Today, he reports no joint pain or swelling and no morning stiffness. He continues to have some left shoulder discomfort secondary to osteoarthritis. Despite his discomfort, he is able to perform most, if not all, daily activities without concern. He enjoys bowling in his free time. He is continued on 20mg methotrexate weekly and 4mg prednisone daily. He also takes extra strength tylenol as needed.      Review of Systems:   Pertinent items are noted in HPI or as below, remainder of complete ROS is negative.      No recent problems with hearing or vision. No swallowing problems.   No breathing difficulty, shortness of breath, coughing, or wheezing  No chest pain or palpitations  No heart burn, indigestion, abdominal pain, nausea, vomiting, diarrhea  No urination problems, no bloody, cloudy urine, no dysuria  No numbing, tingling, weakness  No headaches or confusion  No rashes. No easy bleeding or bruising.      Active Medications:     Current Outpatient Medications:      Acetaminophen (TYLENOL PO), Take 500 mg by mouth every 6 hours as needed for mild pain or fever, Disp: , Rfl:      alendronate (FOSAMAX) 70 MG tablet, Take 1 tablet (70 mg) by mouth once a week Takes every Sunday, Disp: 12 tablet, Rfl: 3     AMLODIPINE BESYLATE PO, Take 5 mg by mouth daily, Disp: , Rfl:      ASPIRIN PO, Take 81 mg by mouth daily, Disp: , Rfl:      brimonidine (ALPHAGAN-P) 0.15 % ophthalmic solution, Place 1 drop into both eyes 2 times daily , Disp: , Rfl:      calcium-vitamin D (CALTRATE) 600-400 MG-UNIT per tablet, Take 1 tablet by mouth 2 times daily, Disp: 60 tablet, Rfl: 11     cholecalciferol (VITAMIN D3) 1000 UNIT tablet, Take 1 tablet (1,000 Units) by mouth daily, Disp: 30 tablet, Rfl: 11     FOLIC ACID PO, Take 800 mcg by mouth daily, Disp: , Rfl:      methotrexate 2.5 MG tablet CHEMO, Take 7 tablets (17.5 mg) by mouth once a week, Disp: 84 tablet, Rfl: 1     OMEPRAZOLE PO, Take 20 mg by mouth daily, Disp: , Rfl:      predniSONE (DELTASONE) 1 MG tablet, Take 4 tabs daily while tapering prednisone per Doctor instructions, Disp: 200 tablet, Rfl: 2     predniSONE (DELTASONE) 10 MG tablet, 40 mg daily for 5 days, then 20 mg daily for 5 days then off (Patient taking differently: 6 mg 40 mg daily for 5 days, then 20 mg daily for 5 days then off), Disp: 30 tablet, Rfl: 1     predniSONE (DELTASONE) 5 MG tablet, Take 1 tablet (5 mg) by mouth daily, Disp: 90 tablet, Rfl: 1     tamsulosin (FLOMAX) 0.4 MG capsule, Take 0.8 mg by mouth daily, Disp: , Rfl:       Allergies:   Cialis  [tadalafil]      Past Medical History:  Anemia  HTN  HLD  BPH with negative prostate biopsies in 2005  RBBB  Unprovoked PE 2/2015, treated with anticoagulation for 12 months  Seronegative inflammatory arthritis  OA  UTI in 2004 with hemorrhagic cystitis in 2008  Borderline glaucoma  Acute pseudogout of left knee 11/2015   Osteopenia       Past Surgical  "History:  Left inguinal hernia repair  Cataract repair  Left TKA 4/2016  Bilateral temporal artery biopsies 11/2016-negative      Family History:   Father - cancer  Mother - cancer  Brother - prostate cancer  Sister - cancer       Social History:   Former smoker  No smokeless tobacco use  Occasional alcohol use, 1 drink/week       Physical Exam:   /72   Pulse 77   Temp 98.3  F (36.8  C) (Oral)   Ht 1.727 m (5' 8\")   Wt 73.7 kg (162 lb 6.4 oz)   SpO2 97%   BMI 24.69 kg/m      Wt Readings from Last 4 Encounters:   04/19/19 73.7 kg (162 lb 6.4 oz)   10/26/18 73.1 kg (161 lb 1.6 oz)   04/27/18 67.9 kg (149 lb 11.2 oz)   11/17/17 70.9 kg (156 lb 6.4 oz)     Constitutional: Well-developed, appearing stated age; cooperative  Eyes: Normal EOM, PERRLA, vision, conjunctiva, sclera  ENT: Normal external ears, nose, hearing, lips, teeth, gums, throat. No mucous membrane lesions, normal saliva pool  Neck: No mass or thyroid enlargement  Resp: Lungs clear to auscultation, nl to palpation  CV: RRR, no murmurs, rubs or gallops, no edema  GI: No ABD mass or tenderness, no HSM  : Not tested  Lymph: No cervical, supraclavicular, inguinal or epitrochlear nodes  MS: The TMJ, neck, elbow, wrist, MCP, spine, hip, knee, ankle, and foot MTP/IP joints were examined and found normal. No active synovitis or altered joint anatomy. Full joint ROM. Normal  strength. No dactylitis,  tenosynovitis, enthesopathy. Angulation changes in PIPs and DIPs. Limited left shoulder flexion.   Skin: No nail pitting, alopecia, rash, nodules or lesions  Neuro: Normal cranial nerves, strength, sensation, DTRs.   Psych: Normal judgement, orientation, memory, affect.       Laboratory:   RHEUM RESULTS Latest Ref Rng & Units 10/26/2018 12/27/2018 2/25/2019   SED RATE 0 - 20 mm/h 9 13 21(H)   CRP, INFLAMMATION 0.0 - 8.0 mg/L 11.4(H) 3.3 10.5(H)   AST 0 - 45 U/L 21 19 19   ALT 0 - 70 U/L 27 20 26   ALBUMIN 3.4 - 5.0 g/dL - - -   WBC 4.0 - 11.0 10e9/L " 10.2 9.1 7.1   RBC 4.4 - 5.9 10e12/L 4.79 4.75 4.67   HGB 13.3 - 17.7 g/dL 14.0 14.3 14.1   HCT 40.0 - 53.0 % 44.2 42.4 42.2   MCV 78 - 100 fl 92 89 90   MCHC 31.5 - 36.5 g/dL 31.7 33.7 33.4   RDW 10.0 - 15.0 % 17.3(H) 16.9(H) 17.6(H)    - 450 10e9/L 230 256 242   CREATININE 0.66 - 1.25 mg/dL - - -   GFR ESTIMATE, IF BLACK >60 mL/min/1.7m2 - - -   GFR ESTIMATE >60 mL/min/1.7m2 - - -    - 1,620 mg/dL - - -   IGA 70 - 380 mg/dL - - -   IGM 60 - 265 mg/dL - - -        Albumin Fraction   Date Value Ref Range Status   02/17/2017 3.8 3.7 - 5.1 g/dL Final     Alpha 2 Fraction   Date Value Ref Range Status   02/17/2017 0.9 0.5 - 0.9 g/dL Final     Beta Fraction   Date Value Ref Range Status   02/17/2017 0.6 0.6 - 1.0 g/dL Final     Gamma Fraction   Date Value Ref Range Status   02/17/2017 0.6 (L) 0.7 - 1.6 g/dL Final     Monoclonal Peak   Date Value Ref Range Status   02/17/2017 0.1 (H) 0.0 g/dL Final     ELP Interpretation:   Date Value Ref Range Status   02/17/2017   Final    Two very small monoclonal proteins (each about 0.1 g/dL or less) seen in the   gamma fraction.  See immunofixation report on same specimen. Pathologic   significance requires clinical correlation.  JOYCE Monroy M.D., Ph.D.,   Pathologist ().         Monoclonal Peak Urine   Date Value Ref Range Status   02/17/2017 0.0 0% % Final     Immunofixation ELP   Date Value Ref Range Status   02/17/2017   Final    (Note)  Monoclonal IgG immunoglobulin of kappa light chain type. Very  small monoclonal IgG immunoglobulin of lambda light chain type.  Monoclonal antibody therapeutics (e.g. Daratumumab) may appear as  monoclonal proteins on serum electrophoresis and immunofixation.  Results should be interpreted with caution if the patient is  receiving monoclonal antibody therapy. Pathological significance  requires clinical correlation.  JOYCE Monroy M.D., Ph.D.,  Pathologist ([phone])         IGG   Date Value Ref Range Status    02/17/2017 702 695 - 1,620 mg/dL Final     IGA   Date Value Ref Range Status   02/17/2017 119 70 - 380 mg/dL Final     IGM   Date Value Ref Range Status   02/17/2017 14 (L) 60 - 265 mg/dL Final         Scribe Disclosure:  I, Ba Toussaint, am serving as a scribe to document services personally performed by Tee Goyal MD at this visit, based upon the provider's statements to me. All documentation has been reviewed by the aforementioned provider prior to being entered into the official medical record.      Tee Goyal MD

## 2019-04-19 NOTE — PATIENT INSTRUCTIONS
Diagnosis:   1. Polymyalgia rheumatica, quiescent  2. Recent pneumonia    Plan:  - Continue prednisone 4mg daily.  - Decrease methotrexate dose from 8 to 6 tablets (to reduce immunosuppression)  - If after no problems after 3 months, reduce prednisone by 1mg per day every month until off.

## 2019-07-11 DIAGNOSIS — Z51.81 ENCOUNTER FOR THERAPEUTIC DRUG MONITORING: ICD-10-CM

## 2019-07-11 DIAGNOSIS — M35.3 POLYMYALGIA RHEUMATICA (H): ICD-10-CM

## 2019-07-11 DIAGNOSIS — D64.9 ANEMIA: ICD-10-CM

## 2019-07-11 DIAGNOSIS — Z87.39 HX OF CALCIUM PYROPHOSPHATE DEPOSITION DISEASE (CPPD): ICD-10-CM

## 2019-07-11 LAB
ERYTHROCYTE [DISTWIDTH] IN BLOOD BY AUTOMATED COUNT: 16.3 % (ref 10–15)
ERYTHROCYTE [SEDIMENTATION RATE] IN BLOOD BY WESTERGREN METHOD: 16 MM/H (ref 0–20)
HCT VFR BLD AUTO: 40.2 % (ref 40–53)
HGB BLD-MCNC: 13.8 G/DL (ref 13.3–17.7)
MCH RBC QN AUTO: 31.4 PG (ref 26.5–33)
MCHC RBC AUTO-ENTMCNC: 34.3 G/DL (ref 31.5–36.5)
MCV RBC AUTO: 92 FL (ref 78–100)
PLATELET # BLD AUTO: 229 10E9/L (ref 150–450)
RBC # BLD AUTO: 4.39 10E12/L (ref 4.4–5.9)
WBC # BLD AUTO: 6.9 10E9/L (ref 4–11)

## 2019-07-11 PROCEDURE — 85652 RBC SED RATE AUTOMATED: CPT | Mod: ZL | Performed by: INTERNAL MEDICINE

## 2019-07-11 PROCEDURE — 85027 COMPLETE CBC AUTOMATED: CPT | Mod: ZL | Performed by: INTERNAL MEDICINE

## 2019-07-11 PROCEDURE — 86140 C-REACTIVE PROTEIN: CPT | Mod: ZL | Performed by: INTERNAL MEDICINE

## 2019-07-11 PROCEDURE — 36415 COLL VENOUS BLD VENIPUNCTURE: CPT | Mod: ZL | Performed by: INTERNAL MEDICINE

## 2019-07-12 LAB — CRP SERPL-MCNC: 10.7 MG/L (ref 0–8)

## 2019-08-16 DIAGNOSIS — Z87.39 HX OF CALCIUM PYROPHOSPHATE DEPOSITION DISEASE (CPPD): ICD-10-CM

## 2019-08-16 DIAGNOSIS — M72.2 PLANTAR FASCIITIS: ICD-10-CM

## 2019-08-16 DIAGNOSIS — M35.3 PMR (POLYMYALGIA RHEUMATICA) (H): ICD-10-CM

## 2019-08-16 DIAGNOSIS — D64.9 ANEMIA, UNSPECIFIED TYPE: ICD-10-CM

## 2019-08-16 DIAGNOSIS — Z87.39 HISTORY OF CALCIUM PYROPHOSPHATE DEPOSITION DISEASE (CPPD): ICD-10-CM

## 2019-08-16 DIAGNOSIS — Z51.81 ENCOUNTER FOR THERAPEUTIC DRUG MONITORING: ICD-10-CM

## 2019-08-20 RX ORDER — PREDNISONE 1 MG/1
TABLET ORAL
Qty: 200 TABLET | Refills: 2 | Status: SHIPPED | OUTPATIENT
Start: 2019-08-20 | End: 2019-10-18

## 2019-08-20 NOTE — TELEPHONE ENCOUNTER
PREDNISONE 1 MG TAB 1 TAB  Take 4 tabs daily while tapering prednisone per Doctor instructions.  Last Written Prescription Date:  4/19/2019  Last Fill Quantity: 200,   # refills: 2  Last Office Visit : 4/19/2019  Future Office visit:  10/18/2019    Routing refill request to provider for review/approval because:  Tapered medication, recent MyChart 8/12/2019 and 8/15/2019 discussions regarding starting Prednisone.

## 2019-10-18 ENCOUNTER — OFFICE VISIT (OUTPATIENT)
Dept: RHEUMATOLOGY | Facility: CLINIC | Age: 84
End: 2019-10-18
Attending: INTERNAL MEDICINE
Payer: MEDICARE

## 2019-10-18 VITALS
OXYGEN SATURATION: 97 % | DIASTOLIC BLOOD PRESSURE: 75 MMHG | TEMPERATURE: 97.9 F | WEIGHT: 161 LBS | HEART RATE: 78 BPM | BODY MASS INDEX: 24.48 KG/M2 | SYSTOLIC BLOOD PRESSURE: 150 MMHG

## 2019-10-18 DIAGNOSIS — Z87.39 HISTORY OF CALCIUM PYROPHOSPHATE DEPOSITION DISEASE (CPPD): ICD-10-CM

## 2019-10-18 DIAGNOSIS — M35.3 PMR (POLYMYALGIA RHEUMATICA) (H): ICD-10-CM

## 2019-10-18 DIAGNOSIS — Z51.81 ENCOUNTER FOR THERAPEUTIC DRUG MONITORING: ICD-10-CM

## 2019-10-18 DIAGNOSIS — D64.9 ANEMIA, UNSPECIFIED TYPE: ICD-10-CM

## 2019-10-18 DIAGNOSIS — Z87.39 HX OF CALCIUM PYROPHOSPHATE DEPOSITION DISEASE (CPPD): ICD-10-CM

## 2019-10-18 DIAGNOSIS — Z23 NEED FOR INFLUENZA VACCINATION: Primary | ICD-10-CM

## 2019-10-18 DIAGNOSIS — M72.2 PLANTAR FASCIITIS: ICD-10-CM

## 2019-10-18 LAB
ALT SERPL W P-5'-P-CCNC: 23 U/L (ref 0–70)
AST SERPL W P-5'-P-CCNC: 21 U/L (ref 0–45)
CREAT SERPL-MCNC: 1.04 MG/DL (ref 0.66–1.25)
CRP SERPL-MCNC: 11.8 MG/L (ref 0–8)
GFR SERPL CREATININE-BSD FRML MDRD: 63 ML/MIN/{1.73_M2}

## 2019-10-18 PROCEDURE — 36415 COLL VENOUS BLD VENIPUNCTURE: CPT | Performed by: INTERNAL MEDICINE

## 2019-10-18 PROCEDURE — G0463 HOSPITAL OUTPT CLINIC VISIT: HCPCS | Mod: 25,ZF

## 2019-10-18 PROCEDURE — 25000128 H RX IP 250 OP 636: Mod: ZF | Performed by: INTERNAL MEDICINE

## 2019-10-18 PROCEDURE — G0008 ADMIN INFLUENZA VIRUS VAC: HCPCS | Mod: ZF

## 2019-10-18 PROCEDURE — 90662 IIV NO PRSV INCREASED AG IM: CPT | Mod: ZF | Performed by: INTERNAL MEDICINE

## 2019-10-18 PROCEDURE — 86140 C-REACTIVE PROTEIN: CPT | Performed by: INTERNAL MEDICINE

## 2019-10-18 PROCEDURE — 84460 ALANINE AMINO (ALT) (SGPT): CPT | Performed by: INTERNAL MEDICINE

## 2019-10-18 PROCEDURE — 82565 ASSAY OF CREATININE: CPT | Performed by: INTERNAL MEDICINE

## 2019-10-18 PROCEDURE — 84450 TRANSFERASE (AST) (SGOT): CPT | Performed by: INTERNAL MEDICINE

## 2019-10-18 RX ORDER — PREDNISONE 1 MG/1
TABLET ORAL
Qty: 200 TABLET | Refills: 2 | Status: SHIPPED | OUTPATIENT
Start: 2019-10-18 | End: 2020-01-08

## 2019-10-18 RX ADMIN — INFLUENZA A VIRUS A/MICHIGAN/45/2015 X-275 (H1N1) ANTIGEN (FORMALDEHYDE INACTIVATED), INFLUENZA A VIRUS A/SINGAPORE/INFIMH-16-0019/2016 IVR-186 (H3N2) ANTIGEN (FORMALDEHYDE INACTIVATED), AND INFLUENZA B VIRUS B/MARYLAND/15/2016 BX-69A (A B/COLORADO/6/2017-LIKE VIRUS) ANTIGEN (FORMALDEHYDE INACTIVATED) 0.5 ML: 60; 60; 60 INJECTION, SUSPENSION INTRAMUSCULAR at 09:38

## 2019-10-18 ASSESSMENT — PAIN SCALES - GENERAL: PAINLEVEL: NO PAIN (0)

## 2019-10-18 NOTE — NURSING NOTE
Chief Complaint   Patient presents with     RECHECK     follow up         BP (!) 150/75 (BP Location: Right arm, Patient Position: Sitting, Cuff Size: Adult Regular)   Pulse 78   Temp 97.9  F (36.6  C)   Wt 73 kg (161 lb)   SpO2 97%   BMI 24.48 kg/m        Ramon Leija, EMT

## 2019-10-18 NOTE — PATIENT INSTRUCTIONS
Diagnosis:  1. Polymyalgia rheumatica, quiescent    Plan:  --begin lowering prednisone dosage. On November 1st, decrease prednisone to 3 mg daily, then decrease to 2 mg daily on December 1st, then to 1 mg daily on January first, and discontinue on February 1st if still feeling well.   -- Continue methotrexate 6 tabs weekly.

## 2019-10-18 NOTE — PROGRESS NOTES
"[facility]  Rheumatology Clinic  Tee Goyal MD  10/18/2019     Name: Christian Akers  MRN: [id number]  Age: 89 year old  : 1930  Referring provider: Luana Martinez    Assessment and Plan:  #Polymyalgia Rheumatica:  Patient relates no shoulder or hip girdle symptoms, and denies headaches or visual changes. Exam is benign. Blood work in July showed CBC normal and CRP mildly elevated at 10.7.    Polymyalgia rheumatica, by history and exam, is in remission. Prednisone may be tapered further. I recommend tapering by 1 mg per month until off. I recommend continuing methotrexate 15 mg once weekly. While on the drug, he shoulder undergo every 3-4 month LFTs, CBC, creatinine, and CRP.     # Health Maintenance:  Flu shot today. Patient's daughter requests advice about a  CT scan of the chest which showed \"few micronodules\". I am reassured that his robust lifestyle (bowling and daily push-ups) argue against progression of a pulmonary malignancy process, and so I think the rationale for performing follow up CT is equivocal.  I recommended that he confer with his primary provider about the need for follow up studies.  Return to clinic in six months.   Orders:  - methotrexate 2.5 MG tablet  Dispense: 84 tablet; Refill: 3  - AST  - ALT  - Creatinine    Follow-up: Return in about 6 months (around 2020).    HPI:   Christian Akers is a 89 year old male with a history including polyarticular inflammatory arthritis, pseudogout, and osteoarthritis who presents with his daughter for follow-up. I last evaluated the patient 2019, at which time polymyalgia rheumatica remained completely suppressed symptomatically. I suggest he reduce methotrexate to 15 mg weekly once again    Today, the patient reports that he has been well recently without significant daily pain. He states that his headaches have resolved. He denies any issues with tolerating medication, morning stiffness, or recent medication changes as he " has continued 4 mg prednisone daily and 15 mg methotrexate weekly. He notes that he has been able to complete 10 push ups every morning.     He has been taking Tylenol extra strength once daily as he was instructed to take this for pain, but he is unsure of why he takes it.    Of note, patient's daughter reports that a  CT scan of the chest was completed in December of 2016 which showed a few small nodules noted in the lungs and could be followed for in 12 months to discern stability. He notes that he has also been completing physical therapy for his knees which has been helpful as he has been able to bowl.    Review of Systems:   Pertinent items are noted in HPI or as below, remainder of complete ROS is negative.      No recent problems with hearing or vision. No swallowing problems.   No breathing difficulty, shortness of breath, coughing, or wheezing.  No chest pain or palpitations.  No heart burn, indigestion, abdominal pain, nausea, vomiting, diarrhea.  No urination problems, no bloody, cloudy urine, no dysuria.  No numbing, tingling, weakness.  No headaches or confusion.  No rashes. No easy bleeding or bruising.     Active Medications:   Current Outpatient Medications:      Acetaminophen (TYLENOL PO), Take 500 mg by mouth every 6 hours as needed for mild pain or fever, Disp: , Rfl:      alendronate (FOSAMAX) 70 MG tablet, Take 1 tablet (70 mg) by mouth once a week Takes every Sunday, Disp: 12 tablet, Rfl: 3     AMLODIPINE BESYLATE PO, Take 5 mg by mouth daily, Disp: , Rfl:      brimonidine (ALPHAGAN-P) 0.15 % ophthalmic solution, Place 1 drop into both eyes 2 times daily , Disp: , Rfl:      calcium-vitamin D (CALTRATE) 600-400 MG-UNIT per tablet, Take 1 tablet by mouth 2 times daily, Disp: 60 tablet, Rfl: 11     cholecalciferol (VITAMIN D3) 1000 UNIT tablet, Take 1 tablet (1,000 Units) by mouth daily, Disp: 30 tablet, Rfl: 11     FOLIC ACID PO, Take 800 mcg by mouth daily, Disp: , Rfl:      methotrexate 2.5 MG  tablet, Take 6 tablets (15 mg) by mouth once a week, Disp: 84 tablet, Rfl: 3     Multiple Vitamins-Minerals (MULTIVITAMIN ADULTS 50+ PO), Take by mouth daily, Disp: , Rfl:      OMEPRAZOLE PO, Take 20 mg by mouth daily, Disp: , Rfl:      predniSONE (DELTASONE) 1 MG tablet, TAKE 4 TABS (4MG) DAILY WHILE TAPERING PREDNISONE PER DOCTOR INSTRUCTIONS., Disp: 200 tablet, Rfl: 2     tamsulosin (FLOMAX) 0.4 MG capsule, Take 0.8 mg by mouth daily, Disp: , Rfl:      ASPIRIN PO, Take 81 mg by mouth daily, Disp: , Rfl:      azithromycin (ZITHROMAX) 250 MG tablet, TK 1 T PO D FOR FOUR DAYS, Disp: , Rfl: 0     predniSONE (DELTASONE) 10 MG tablet, 40 mg daily for 5 days, then 20 mg daily for 5 days then off (Patient not taking: Reported on 4/19/2019.), Disp: 30 tablet, Rfl: 1     predniSONE (DELTASONE) 5 MG tablet, Take 1 tablet (5 mg) by mouth daily (Patient not taking: Reported on 4/19/2019.), Disp: 90 tablet, Rfl: 1    Current Facility-Administered Medications:      influenza Vac Split High-Dose (FLUZONE) injection 0.5 mL, 0.5 mL, Intramuscular, Once, Tee Goyal MD    Allergies:  Cialis     Past Medical History:  History of calcium pyrophosphate deposition disease  Encounter for therapeutic drug monitoring  Anemia  Hypertension  Hyperlipidemia  BPH with negative prostate biopsies in 2005  Unprovoked PE 2/2015, treated with anticoagulation for 12 months  Seronegative inflammatory arthritis  Osteoarthritis   UTI in 2004 with hemorrhagic cystitis in 2008  Borderline glaucoma  Acute pseudogout of left knee 11/2015   Osteopenia    Past Surgical History:  Left inguinal hernia repair  Cataract repair  Left total knee arthoplasty 4/2016  Bilateral temporal artery biopsies 11/2016-negative    Family History:    Father - cancer  Mother - cancer  Brother - prostate cancer  Sister - cancer     Social History:  The patient reports that he has quit smoking. He has never used smokeless tobacco. He reports that he drinks alcohol  occasionally, around 1 drink per week.  PCP: Brenda Onofre  Marital Status:      Physical Exam:   BP (!) 150/75 (BP Location: Right arm, Patient Position: Sitting, Cuff Size: Adult Regular)   Pulse 78   Temp 97.9  F (36.6  C)   Wt 73 kg (161 lb)   SpO2 97%   BMI 24.48 kg/m     Wt Readings from Last 4 Encounters:   10/18/19 73 kg (161 lb)   04/19/19 73.7 kg (162 lb 6.4 oz)   10/26/18 73.1 kg (161 lb 1.6 oz)   04/27/18 67.9 kg (149 lb 11.2 oz)     Constitutional: Well-developed, appearing stated age; cooperative.  Eyes: Normal EOM, PERRLA, vision, conjunctiva, sclera.  ENT: Normal external ears, nose, hearing, lips, teeth, gums, throat. No mucous membrane lesions, normal saliva pool.  Neck: No mass or thyroid enlargement.  Resp: Lungs clear to auscultation, nl to palpation.  CV: RRR, no murmurs, rubs or gallops, no edema.  GI: No ABD mass or tenderness, no HSM.  : Not tested.  Lymph: No cervical, supraclavicular, inguinal or epitrochlear nodes.  MS: The TMJ, neck, shoulder, elbow, wrist, MCP/PIP/DIP, spine, hip, knee, ankle, and foot MTP/IP joints were examined and found normal. No active synovitis or altered joint anatomy. Full joint ROM. Normal  strength. No dactylitis, tenosynovitis, enthesopathy. Previously noted changes in DIPs are unchanged.   Skin: No nail pitting, alopecia, rash, nodules or lesions  Neuro: Normal cranial nerves, strength, sensation, DTRs.   Psych: Normal judgement, orientation, memory, affect.     Laboratory:   RHEUM RESULTS Latest Ref Rng & Units 4/19/2019 7/11/2019 10/18/2019   SED RATE 0 - 20 mm/h 17 16 -   CRP, INFLAMMATION 0.0 - 8.0 mg/L 13.0(H) 10.7(H) 11.8(H)   AST 0 - 45 U/L 17 - 21   ALT 0 - 70 U/L 21 - 23   ALBUMIN 3.4 - 5.0 g/dL - - -   WBC 4.0 - 11.0 10e9/L 3.7(L) 6.9 -   RBC 4.4 - 5.9 10e12/L 4.37(L) 4.39(L) -   HGB 13.3 - 17.7 g/dL 13.2(L) 13.8 -   HCT 40.0 - 53.0 % 40.4 40.2 -   MCV 78 - 100 fl 92 92 -   MCHC 31.5 - 36.5 g/dL 32.7 34.3 -   RDW 10.0 -  15.0 % 16.2(H) 16.3(H) -    - 450 10e9/L 189 229 -   CREATININE 0.66 - 1.25 mg/dL - - 1.04   GFR ESTIMATE, IF BLACK >60 mL/min/[1.73:m2] - - 73   GFR ESTIMATE >60 mL/min/[1.73:m2] - - 63    - 1,620 mg/dL - - -   IGA 70 - 380 mg/dL - - -   IGM 60 - 265 mg/dL - - -     Albumin Fraction   Date Value Ref Range Status   02/17/2017 3.8 3.7 - 5.1 g/dL Final     Alpha 2 Fraction   Date Value Ref Range Status   02/17/2017 0.9 0.5 - 0.9 g/dL Final     Beta Fraction   Date Value Ref Range Status   02/17/2017 0.6 0.6 - 1.0 g/dL Final     Gamma Fraction   Date Value Ref Range Status   02/17/2017 0.6 (L) 0.7 - 1.6 g/dL Final     Monoclonal Peak   Date Value Ref Range Status   02/17/2017 0.1 (H) 0.0 g/dL Final     ELP Interpretation:   Date Value Ref Range Status   02/17/2017   Final    Two very small monoclonal proteins (each about 0.1 g/dL or less) seen in the   gamma fraction.  See immunofixation report on same specimen. Pathologic   significance requires clinical correlation.  JOYCE Monroy M.D., Ph.D.,   Pathologist ().       Monoclonal Peak Urine   Date Value Ref Range Status   02/17/2017 0.0 0% % Final     Immunofixation ELP   Date Value Ref Range Status   02/17/2017   Final    (Note)  Monoclonal IgG immunoglobulin of kappa light chain type. Very  small monoclonal IgG immunoglobulin of lambda light chain type.  Monoclonal antibody therapeutics (e.g. Daratumumab) may appear as  monoclonal proteins on serum electrophoresis and immunofixation.  Results should be interpreted with caution if the patient is  receiving monoclonal antibody therapy. Pathological significance  requires clinical correlation.  JOYCE Monroy M.D., Ph.D.,  Pathologist ([phone])         IGG   Date Value Ref Range Status   02/17/2017 702 695 - 1,620 mg/dL Final     IGA   Date Value Ref Range Status   02/17/2017 119 70 - 380 mg/dL Final     IGM   Date Value Ref Range Status   02/17/2017 14 (L) 60 - 265 mg/dL Final     Scribe  Disclosure:  I, Abdiaziz White, am serving as a scribe to document services personally performed by Tee Goyal MD at this visit, based upon the provider's statements to me. All documentation has been reviewed by the aforementioned provider prior to being entered into the official medical record.

## 2019-11-12 ENCOUNTER — TELEPHONE (OUTPATIENT)
Dept: RHEUMATOLOGY | Facility: CLINIC | Age: 84
End: 2019-11-12

## 2019-11-12 NOTE — TELEPHONE ENCOUNTER
[facility] Call Center    Phone Message    May a detailed message be left on voicemail: yes    Reason for Call: Medication Question or concern regarding medication   Prescription Clarification  Name of Medication: predniSONE (DELTASONE) 1 MG tablet  Prescribing Provider: Dr. Tee Fernandez   [place] [facility]  [place] [facility] [address]   What on the order needs clarification? Pt's daughter Amarilis requesting a call from nursing staff. She states pt was released home from the hospital after a mild heart attack, and they are wondering if pt should continue to be on prednisone. Amarilis noted his care team at the hospital is starting him on metropolol, Lipitor, and Plavix. They had also told pt to stop taking amlodipine. Please call Amarilis to discuss.    Action Taken: Message routed to:  [facility] ([facility] Rheumatology

## 2019-11-14 NOTE — TELEPHONE ENCOUNTER
Sorry to hear about the heart attack. I recommend reducing prednisone to 2 mg daily now, and then to 1 mg daily on Dec 1 and then off on Dec 15.

## 2019-11-14 NOTE — TELEPHONE ENCOUNTER
Spoke with Amarilis regarding Victor M's medications.  Victor M just had a pace maker placed this day in [facility].  Amarilis is concerned about some of the medications he is taking such as Prednisone 3 mg daily and he will decrease to 2mg on December 1.  Methotrexate 15mg; he takes on every Friday.    Victor M Olmos's daughter is wondering if Victor M should stop taking Methotrexate and the Prednisone; Victor M is not taking any antibiotics but the concern is because of the pace maker surgery today.       Will route to Dr. Goyal for further recommendations.

## 2019-11-15 NOTE — TELEPHONE ENCOUNTER
Spoke with Victor M Olmos's daughter to provide the following message     Sorry to hear about the heart attack. I recommend reducing prednisone to 2 mg daily now, and then to 1 mg daily on Dec 1 and then off on Dec 15.    And spoke to Dr. Goyal in person regarding the Methotrexate and his recommendation is to hold the Methotrexate for one week after the surgery; message was passed on to Amarilis.     Ashwini Blanton MSN, RN  Rheumatology RN Care Coordinator  [facility]

## 2019-11-18 DIAGNOSIS — Z87.39 HX OF CALCIUM PYROPHOSPHATE DEPOSITION DISEASE (CPPD): ICD-10-CM

## 2019-11-18 DIAGNOSIS — M72.2 PLANTAR FASCIITIS: ICD-10-CM

## 2019-11-18 DIAGNOSIS — Z87.39 HISTORY OF CALCIUM PYROPHOSPHATE DEPOSITION DISEASE (CPPD): ICD-10-CM

## 2019-11-18 DIAGNOSIS — Z51.81 ENCOUNTER FOR THERAPEUTIC DRUG MONITORING: ICD-10-CM

## 2019-11-18 DIAGNOSIS — D64.9 ANEMIA, UNSPECIFIED TYPE: ICD-10-CM

## 2019-11-18 DIAGNOSIS — M35.3 PMR (POLYMYALGIA RHEUMATICA) (H): ICD-10-CM

## 2019-11-19 RX ORDER — ALENDRONATE SODIUM 70 MG/1
70 TABLET ORAL WEEKLY
Qty: 12 TABLET | Refills: 3 | Status: SHIPPED | OUTPATIENT
Start: 2019-11-19 | End: 2020-07-24

## 2019-11-19 NOTE — TELEPHONE ENCOUNTER
ALENDRONATE NA 70 MG TAB 70 TAB     Last Written Prescription Date:  10/26/2018  Last Fill Quantity: 12,   # refills: 3  Last Office Visit : 10/18/2019  Future Office visit:  4/17/2020    Routing refill request to provider for review/approval because:  Sending to Provider for review and refills due to Dexa scan report.    Would the provider like an updated Dexa Scan on file for Pt care and refills???        #Osteopenia   Dr. Goyal's Note 10/26/2018  Last DEXA reportedly done in Summer 2018 however this is not in our system. On Fosamax 70mg weekly, plus Ca and Vitamin D.        Charla Stanton RN  Central Triage Red Flags/Med Refills

## 2019-12-13 ENCOUNTER — TELEPHONE (OUTPATIENT)
Dept: RHEUMATOLOGY | Facility: CLINIC | Age: 84
End: 2019-12-13

## 2019-12-13 DIAGNOSIS — M35.3 PMR (POLYMYALGIA RHEUMATICA) (H): Primary | ICD-10-CM

## 2019-12-13 NOTE — TELEPHONE ENCOUNTER
Health Call Center    Phone Message    May a detailed message be left on voicemail: yes    Reason for Call: Symptoms or Concerns     Current symptom or concern: pt's iron level is low and CRP results concerning, pt is still tapering down from prednisone, pt is at 2 mg, pt recently had a stent and pacemaker put in Nov 2019. Please call pt's daughter. Thank you.    Symptoms have been present for: NOT sure    Has patient previously been seen for this? Yes    By: Dr. Tee Goyal    Date: Oct 2019    Are there any new or worsening symptoms? Yes. CRP was 11.8 in Oct 2019, now CRP is 23.0.      Action Taken: Message routed to:  [facility]

## 2019-12-16 NOTE — TELEPHONE ENCOUNTER
Spoke to Amarilis and let her know the following as per Dr. Goyal:    Sorry to hear that patient is not doing as well. I suggest that he increase prednisone to 3 mg daily X 30 d, 3 RF. Check Cr, CRP and give followup report in mid January.    Will place orders.    Ruba Caicedo, IFTIKHARN RN   Rheumatology Clinic Nurse   [facility]

## 2019-12-16 NOTE — TELEPHONE ENCOUNTER
Sorry to hear that patient is not doing as well. I suggest that he increase prednisone to 3 mg daily X 30 d, 3 RF. Check Cr, CRP and give followup report in mid January.

## 2019-12-16 NOTE — TELEPHONE ENCOUNTER
"Called Amarilis (pts daughter) back and asked her to clarify the phone call we had received. She states that she was told to report the lab results since pt gets them at a location that does not do care everywhere. The CRP is currently 23.0 and she states that his iron is also low but she does not know what the value of this was and it was not written in the initial phone call. She faxed over the results on Friday and those should be in the system soon. I asked whether there has been any changes in the pts symptoms and it was state that Victor M hs been more fatigued lately ever since mid November and that he has lost some of his \"spunk\". He has gone down to 1 mg of prednisone on Friday and was on 2 tabs prior to this.    Ruba Caicedo, IFTIKHARN RN   Rheumatology Clinic Nurse   [facility]    "

## 2019-12-17 DIAGNOSIS — M35.3 PMR (POLYMYALGIA RHEUMATICA) (H): ICD-10-CM

## 2019-12-17 LAB
CREAT SERPL-MCNC: 1.1 MG/DL (ref 0.66–1.25)
CRP SERPL-MCNC: 26.2 MG/L (ref 0–8)
GFR SERPL CREATININE-BSD FRML MDRD: 59 ML/MIN/{1.73_M2}

## 2019-12-17 PROCEDURE — 36415 COLL VENOUS BLD VENIPUNCTURE: CPT | Mod: ZL | Performed by: INTERNAL MEDICINE

## 2019-12-17 PROCEDURE — 82565 ASSAY OF CREATININE: CPT | Mod: ZL | Performed by: INTERNAL MEDICINE

## 2019-12-17 PROCEDURE — 86140 C-REACTIVE PROTEIN: CPT | Mod: ZL | Performed by: INTERNAL MEDICINE

## 2019-12-18 RX ORDER — PREDNISONE 1 MG/1
3 TABLET ORAL DAILY
Qty: 90 TABLET | Refills: 3 | Status: SHIPPED | OUTPATIENT
Start: 2019-12-18 | End: 2020-01-08

## 2020-01-06 NOTE — PROGRESS NOTES
Subjective     Christian Akers is a 89 year old male who presents to clinic today for the following health issues:    HPI   New Patient/Transfer of Care  Gastrointestinal symptoms      Duration: Couple weeks    Description:           BLEEDING - black/tarry stools      Intensity:  moderate    Accompanying signs and symptoms Constipation, painful bowel movements    History  Previous similar problem: no   Previous evaluation:  none    Aggravating factors: none    Alleviating factors: nothing    Other Therapies tried: None. Patient is currently on a iron pill would like to discuss if he still has to be on it.      Victor M presents today to establish care.  Victor M has a recent history of NSTEMI requiring PCI and Ruiz x 1 to mLAD, followed by complete heart block just two days later requiring pacer placement.   He is currently enrolled in cardiac rehab.  He remains on Asa and Plavix.  His history is also remarkable for HTN, PMR, Pulmonary HTN and BPH.  Victor M states he has been feeling relatively well but has experienced dark tarry stools recently.  He denies any BRBPR and denies any associated abdominal pain.  He is taking iron currently.  He does reports some constipation.  He reports his last colonoscopy was years ago.  He otherwise denies any chest pain or SOB.      Patient Active Problem List   Diagnosis     History of calcium pyrophosphate deposition disease (CPPD)     Encounter for therapeutic drug monitoring     Anemia     Heart block AV third degree (H)     Coronary artery disease involving native coronary artery of native heart without angina pectoris     Past Surgical History:   Procedure Laterality Date     KNEE SURGERY Left     knee replacement       Social History     Tobacco Use     Smoking status: Former Smoker     Smokeless tobacco: Never Used   Substance Use Topics     Alcohol use: Not on file     Family History   Problem Relation Age of Onset     Cancer Mother      Cerebrovascular Disease Mother      Cancer  Father          Current Outpatient Medications   Medication Sig Dispense Refill     alendronate (FOSAMAX) 70 MG tablet Take 1 tablet (70 mg) by mouth once a week Takes every Sunday 12 tablet 3     Ascorbic Acid (VITAMIN C) 500 MG CAPS Take 1 capsule by mouth daily       ASPIRIN PO Take 81 mg by mouth daily       atorvastatin (LIPITOR) 40 MG tablet Take 40 mg by mouth daily       brimonidine (ALPHAGAN-P) 0.15 % ophthalmic solution Place 1 drop into both eyes 2 times daily        calcium-vitamin D (CALTRATE) 600-400 MG-UNIT per tablet Take 1 tablet by mouth 2 times daily 60 tablet 11     cholecalciferol (VITAMIN D3) 1000 UNIT tablet Take 1 tablet (1,000 Units) by mouth daily 30 tablet 11     clopidogrel (PLAVIX) 75 MG tablet Take 75 mg by mouth daily       cyanocobalamin (VITAMIN B-12) 1000 MCG tablet Take 100 mcg by mouth daily       ferrous sulfate (FEROSUL) 325 (65 Fe) MG tablet Take 325 mg by mouth daily (with breakfast)       FOLIC ACID PO Take 800 mcg by mouth daily       GNP GARLIC EXTRACT PO Take 1,000 mg by mouth daily       Magnesium Oxide 250 MG TABS Take 1 tablet by mouth daily       methotrexate 2.5 MG tablet Take 6 tablets (15 mg) by mouth once a week 84 tablet 5     metoprolol tartrate (LOPRESSOR) 25 MG tablet Take 25 mg by mouth 2 times daily       Multiple Vitamins-Minerals (MULTIVITAMIN ADULTS 50+ PO) Take by mouth daily       nitroGLYcerin (NITROSTAT) 0.4 MG sublingual tablet Place 0.4 mg under the tongue       OMEPRAZOLE PO Take 20 mg by mouth daily       predniSONE (DELTASONE) 1 MG tablet Take 3 mg by mouth daily       sildenafil (REVATIO) 20 MG tablet Take 20 mg by mouth 1 pill at least 6 hours apart-  Treats pulmonary arterial hypertension       tamsulosin (FLOMAX) 0.4 MG capsule Take 0.8 mg by mouth daily       Allergies   Allergen Reactions     Cialis  [Tadalafil] Other (See Comments)     BP Readings from Last 3 Encounters:   01/08/20 122/64   10/18/19 (!) 150/75   04/19/19 163/72    Wt  "Readings from Last 3 Encounters:   01/08/20 70.3 kg (155 lb)   10/18/19 73 kg (161 lb)   04/19/19 73.7 kg (162 lb 6.4 oz)               Reviewed and updated as needed this visit by Provider         Review of Systems   ROS COMP: Constitutional, HEENT, cardiovascular, pulmonary, GI, , musculoskeletal, neuro, skin, endocrine and psych systems are negative, except as otherwise noted.      Objective    /64 (BP Location: Left arm, Patient Position: Chair, Cuff Size: Adult Regular)   Pulse 70   Temp 96.9  F (36.1  C) (Tympanic)   Ht 1.702 m (5' 7\")   Wt 70.3 kg (155 lb)   SpO2 99%   BMI 24.28 kg/m    Body mass index is 24.28 kg/m .  Physical Exam   GENERAL: Alert and no distress  EYES: Eyes grossly normal to inspection, PERRL and conjunctivae and sclerae normal  HENT: ear canals and TM's normal, nose and mouth without ulcers or lesions  NECK: no adenopathy, no asymmetry, masses, or scars and thyroid normal to palpation  RESP: lungs clear to auscultation - no rales, rhonchi or wheezes  CV: regular rate and rhythm, normal S1 S2, no S3 or S4, no murmurs.  Pacer sight looks good.    ABDOMEN: soft, nontender, no hepatosplenomegaly, no masses and bowel sounds normal  MS: no gross musculoskeletal defects noted, no edema  SKIN: no suspicious lesions or rashes  NEURO: Normal strength and tone, mentation intact and speech normal  PSYCH: mentation appears normal, affect normal/bright    Diagnostic Test Results:    Pending    Assessment & Plan   Problem List Items Addressed This Visit        Circulatory    Heart block AV third degree (H)    Coronary artery disease involving native coronary artery of native heart without angina pectoris      Other Visit Diagnoses     Melena    -  Primary    Relevant Orders    CBC with platelets and differential (Completed)    Basic metabolic panel (Completed)    Pulmonary hypertension (H)        Polymyalgia rheumatica (H)        S/P primary angioplasty with coronary stent        S/P " placement of cardiac pacemaker        PMR (polymyalgia rheumatica) (H)                 Christian Patel, [facility]

## 2020-01-07 ENCOUNTER — NURSE TRIAGE (OUTPATIENT)
Dept: INTERNAL MEDICINE | Facility: OTHER | Age: 85
End: 2020-01-07

## 2020-01-07 NOTE — TELEPHONE ENCOUNTER
"Patient calls today, he is advising provider for the last three weeks he has had \"black\" stools.  Wants to know if he should bring in a stool sample.  Advised him that appropriate kits will be sent home with him tomorrow if needed and not to bring stool.  Patient is new to clinic and is establishing care tomorrow.  This is a FYI   Answer Assessment - Initial Assessment Questions  1. APPEARANCE of BLOOD: \"What color is it?\" \"Is it passed separately, on the surface of the stool, or mixed in with the stool?\"      Per patient these are black   2. AMOUNT: \"How much blood was passed?\"       States each stool for three weeks   3. FREQUENCY: \"How many times has blood been passed with the stools?\"       Each stool   4. ONSET: \"When was the blood first seen in the stools?\" (Days or weeks)       States 3 weeks   5. DIARRHEA: \"Is there also some diarrhea?\" If so, ask: \"How many diarrhea stools were passed in past 24 hours?\"       NONE   6. CONSTIPATION: \"Do you have constipation?\" If so, \"How bad is it?\"      Denies   7. RECURRENT SYMPTOMS: \"Have you had blood in your stools before?\" If so, ask: \"When was the last time?\" and \"What happened that time?\"       Denies   8. BLOOD THINNERS: \"Do you take any blood thinners?\" (e.g., Coumadin/warfarin, Pradaxa/dabigatran, aspirin)      Unknown   9. OTHER SYMPTOMS: \"Do you have any other symptoms?\"  (e.g., abdominal pain, vomiting, dizziness, fever)      Uknown- denies lightheadedness denies abdominal pain    10. PREGNANCY: \"Is there any chance you are pregnant?\" \"When was your last menstrual period?\"        No male    Protocols used: RECTAL BLEEDING-A-AH    "

## 2020-01-08 ENCOUNTER — OFFICE VISIT (OUTPATIENT)
Dept: INTERNAL MEDICINE | Facility: OTHER | Age: 85
End: 2020-01-08
Attending: INTERNAL MEDICINE
Payer: COMMERCIAL

## 2020-01-08 VITALS
TEMPERATURE: 96.9 F | DIASTOLIC BLOOD PRESSURE: 64 MMHG | WEIGHT: 155 LBS | BODY MASS INDEX: 24.33 KG/M2 | OXYGEN SATURATION: 99 % | HEART RATE: 70 BPM | HEIGHT: 67 IN | SYSTOLIC BLOOD PRESSURE: 122 MMHG

## 2020-01-08 DIAGNOSIS — K92.1 MELENA: Primary | ICD-10-CM

## 2020-01-08 DIAGNOSIS — M35.3 POLYMYALGIA RHEUMATICA (H): ICD-10-CM

## 2020-01-08 DIAGNOSIS — Z95.5 S/P PRIMARY ANGIOPLASTY WITH CORONARY STENT: ICD-10-CM

## 2020-01-08 DIAGNOSIS — I44.2 HEART BLOCK AV THIRD DEGREE (H): ICD-10-CM

## 2020-01-08 DIAGNOSIS — I27.20 PULMONARY HYPERTENSION (H): ICD-10-CM

## 2020-01-08 DIAGNOSIS — I25.10 CORONARY ARTERY DISEASE INVOLVING NATIVE CORONARY ARTERY OF NATIVE HEART WITHOUT ANGINA PECTORIS: ICD-10-CM

## 2020-01-08 DIAGNOSIS — Z95.0 S/P PLACEMENT OF CARDIAC PACEMAKER: ICD-10-CM

## 2020-01-08 DIAGNOSIS — M35.3 PMR (POLYMYALGIA RHEUMATICA) (H): ICD-10-CM

## 2020-01-08 LAB
ALT SERPL W P-5'-P-CCNC: 29 U/L (ref 0–70)
ANION GAP SERPL CALCULATED.3IONS-SCNC: 4 MMOL/L (ref 3–14)
AST SERPL W P-5'-P-CCNC: 18 U/L (ref 0–45)
BASOPHILS # BLD AUTO: 0 10E9/L (ref 0–0.2)
BASOPHILS NFR BLD AUTO: 0.4 %
BUN SERPL-MCNC: 20 MG/DL (ref 7–30)
CALCIUM SERPL-MCNC: 9.3 MG/DL (ref 8.5–10.1)
CHLORIDE SERPL-SCNC: 102 MMOL/L (ref 94–109)
CO2 SERPL-SCNC: 30 MMOL/L (ref 20–32)
CREAT SERPL-MCNC: 1.07 MG/DL (ref 0.66–1.25)
DIFFERENTIAL METHOD BLD: ABNORMAL
EOSINOPHIL # BLD AUTO: 0.2 10E9/L (ref 0–0.7)
EOSINOPHIL NFR BLD AUTO: 1.8 %
ERYTHROCYTE [DISTWIDTH] IN BLOOD BY AUTOMATED COUNT: 16.4 % (ref 10–15)
GFR SERPL CREATININE-BSD FRML MDRD: 61 ML/MIN/{1.73_M2}
GLUCOSE SERPL-MCNC: 106 MG/DL (ref 70–99)
HCT VFR BLD AUTO: 40.8 % (ref 40–53)
HGB BLD-MCNC: 13.5 G/DL (ref 13.3–17.7)
LYMPHOCYTES # BLD AUTO: 1 10E9/L (ref 0.8–5.3)
LYMPHOCYTES NFR BLD AUTO: 11.7 %
MCH RBC QN AUTO: 30.1 PG (ref 26.5–33)
MCHC RBC AUTO-ENTMCNC: 33.1 G/DL (ref 31.5–36.5)
MCV RBC AUTO: 91 FL (ref 78–100)
MONOCYTES # BLD AUTO: 1 10E9/L (ref 0–1.3)
MONOCYTES NFR BLD AUTO: 12 %
NEUTROPHILS # BLD AUTO: 6 10E9/L (ref 1.6–8.3)
NEUTROPHILS NFR BLD AUTO: 74.1 %
PLATELET # BLD AUTO: 220 10E9/L (ref 150–450)
POTASSIUM SERPL-SCNC: 3.8 MMOL/L (ref 3.4–5.3)
RBC # BLD AUTO: 4.49 10E12/L (ref 4.4–5.9)
SODIUM SERPL-SCNC: 136 MMOL/L (ref 133–144)
WBC # BLD AUTO: 8.2 10E9/L (ref 4–11)

## 2020-01-08 PROCEDURE — 84450 TRANSFERASE (AST) (SGOT): CPT | Mod: ZL | Performed by: INTERNAL MEDICINE

## 2020-01-08 PROCEDURE — 36415 COLL VENOUS BLD VENIPUNCTURE: CPT | Mod: ZL | Performed by: INTERNAL MEDICINE

## 2020-01-08 PROCEDURE — 84460 ALANINE AMINO (ALT) (SGPT): CPT | Mod: ZL | Performed by: INTERNAL MEDICINE

## 2020-01-08 PROCEDURE — 85025 COMPLETE CBC W/AUTO DIFF WBC: CPT | Mod: ZL | Performed by: INTERNAL MEDICINE

## 2020-01-08 PROCEDURE — G0463 HOSPITAL OUTPT CLINIC VISIT: HCPCS

## 2020-01-08 PROCEDURE — 99205 OFFICE O/P NEW HI 60 MIN: CPT | Performed by: INTERNAL MEDICINE

## 2020-01-08 PROCEDURE — 80048 BASIC METABOLIC PNL TOTAL CA: CPT | Mod: ZL | Performed by: INTERNAL MEDICINE

## 2020-01-08 RX ORDER — NITROGLYCERIN 0.4 MG/1
0.4 TABLET SUBLINGUAL
COMMUNITY
Start: 2019-11-12

## 2020-01-08 RX ORDER — CLOPIDOGREL BISULFATE 75 MG/1
75 TABLET ORAL DAILY
COMMUNITY
Start: 2019-11-12 | End: 2021-05-20

## 2020-01-08 RX ORDER — ATORVASTATIN CALCIUM 40 MG/1
40 TABLET, FILM COATED ORAL DAILY
COMMUNITY

## 2020-01-08 RX ORDER — PREDNISONE 1 MG/1
3 TABLET ORAL DAILY
COMMUNITY
End: 2020-07-16

## 2020-01-08 RX ORDER — FERROUS SULFATE 325(65) MG
325 TABLET ORAL
COMMUNITY
End: 2021-04-19

## 2020-01-08 RX ORDER — METOPROLOL TARTRATE 25 MG/1
25 TABLET, FILM COATED ORAL 2 TIMES DAILY
COMMUNITY
Start: 2019-11-12 | End: 2021-04-19 | Stop reason: DRUGHIGH

## 2020-01-08 RX ORDER — SILDENAFIL CITRATE 20 MG/1
20 TABLET ORAL
COMMUNITY
End: 2021-04-19

## 2020-01-08 RX ORDER — LANOLIN ALCOHOL/MO/W.PET/CERES
100 CREAM (GRAM) TOPICAL DAILY
COMMUNITY
End: 2021-04-19

## 2020-01-08 RX ORDER — MULTIVIT-MIN/IRON/FOLIC ACID/K 18-600-40
1 CAPSULE ORAL DAILY
COMMUNITY

## 2020-01-08 ASSESSMENT — MIFFLIN-ST. JEOR: SCORE: 1326.71

## 2020-01-08 ASSESSMENT — PAIN SCALES - GENERAL: PAINLEVEL: NO PAIN (0)

## 2020-01-09 ENCOUNTER — MYC MEDICAL ADVICE (OUTPATIENT)
Dept: INTERNAL MEDICINE | Facility: OTHER | Age: 85
End: 2020-01-09

## 2020-01-10 NOTE — TELEPHONE ENCOUNTER
I am sure he is referring to the [employer] printout.  Can we explain to him not to worry about it as it is confusing

## 2020-01-15 ENCOUNTER — APPOINTMENT (OUTPATIENT)
Dept: LAB | Facility: OTHER | Age: 85
End: 2020-01-15
Attending: INTERNAL MEDICINE
Payer: MEDICARE

## 2020-01-15 DIAGNOSIS — K92.1 MELENA: Primary | ICD-10-CM

## 2020-01-15 LAB
BASOPHILS # BLD AUTO: 0 10E9/L (ref 0–0.2)
BASOPHILS NFR BLD AUTO: 0.3 %
DIFFERENTIAL METHOD BLD: ABNORMAL
EOSINOPHIL # BLD AUTO: 0.2 10E9/L (ref 0–0.7)
EOSINOPHIL NFR BLD AUTO: 3.6 %
ERYTHROCYTE [DISTWIDTH] IN BLOOD BY AUTOMATED COUNT: 16.8 % (ref 10–15)
HCT VFR BLD AUTO: 39.9 % (ref 40–53)
HGB BLD-MCNC: 13 G/DL (ref 13.3–17.7)
LYMPHOCYTES # BLD AUTO: 1.2 10E9/L (ref 0.8–5.3)
LYMPHOCYTES NFR BLD AUTO: 18.4 %
MCH RBC QN AUTO: 29.9 PG (ref 26.5–33)
MCHC RBC AUTO-ENTMCNC: 32.6 G/DL (ref 31.5–36.5)
MCV RBC AUTO: 92 FL (ref 78–100)
MONOCYTES # BLD AUTO: 0.7 10E9/L (ref 0–1.3)
MONOCYTES NFR BLD AUTO: 10.1 %
NEUTROPHILS # BLD AUTO: 4.6 10E9/L (ref 1.6–8.3)
NEUTROPHILS NFR BLD AUTO: 67.6 %
PLATELET # BLD AUTO: 212 10E9/L (ref 150–450)
RBC # BLD AUTO: 4.35 10E12/L (ref 4.4–5.9)
WBC # BLD AUTO: 6.8 10E9/L (ref 4–11)

## 2020-01-15 PROCEDURE — 85025 COMPLETE CBC W/AUTO DIFF WBC: CPT | Mod: ZL | Performed by: INTERNAL MEDICINE

## 2020-01-15 PROCEDURE — 36415 COLL VENOUS BLD VENIPUNCTURE: CPT | Mod: ZL | Performed by: INTERNAL MEDICINE

## 2020-01-30 ENCOUNTER — TELEPHONE (OUTPATIENT)
Dept: INTERNAL MEDICINE | Facility: OTHER | Age: 85
End: 2020-01-30

## 2020-01-30 NOTE — TELEPHONE ENCOUNTER
Daughter calling, pt having a tooth extracted on 2-   Surgeon wants him to be off his Plavix and ASA 1 week prior to surgery    [facility], [phone]    Please call daughter Amarilis, she sets up his meds    thanks    Mary Lester LPN

## 2020-01-30 NOTE — TELEPHONE ENCOUNTER
I do NOT agree.  He recently had a GUNJAN placed to the LAD and if he stops the Plavix within a year he is at high risk for stent occlusion.  I cannot approve this and it will have to go through his Cardiologist.

## 2020-02-07 ENCOUNTER — TELEPHONE (OUTPATIENT)
Dept: INTERNAL MEDICINE | Facility: OTHER | Age: 85
End: 2020-02-07

## 2020-02-07 NOTE — TELEPHONE ENCOUNTER
2:07 PM    Reason for Call: Phone Call    Description: this pt would like an appointment for this,   Yearly Wellness Exam with Memory Test   Tuesdays or Thursday Appt.'s work best as I am in Cardiac Rehab Monday, Wednesday, and Fridays/vasu request/ for date range 03/10-03/31 with Dr Patel         Was an appointment offered for this call? No  If yes : Appointment type              Date    Preferred method for responding to this message: Telephone Call  What is your phone number ?[phone]    If we cannot reach you directly, may we leave a detailed response at the number you provided? Yes    Can this message wait until your PCP/provider returns, if available today? YES    Joyce Serrato

## 2020-02-11 ENCOUNTER — OFFICE VISIT (OUTPATIENT)
Dept: INTERNAL MEDICINE | Facility: OTHER | Age: 85
End: 2020-02-11
Attending: INTERNAL MEDICINE
Payer: MEDICARE

## 2020-02-11 VITALS
HEART RATE: 60 BPM | SYSTOLIC BLOOD PRESSURE: 124 MMHG | TEMPERATURE: 96.1 F | WEIGHT: 158 LBS | BODY MASS INDEX: 24.75 KG/M2 | DIASTOLIC BLOOD PRESSURE: 72 MMHG | OXYGEN SATURATION: 98 %

## 2020-02-11 DIAGNOSIS — R19.5 DARK STOOLS: Primary | ICD-10-CM

## 2020-02-11 LAB
BASOPHILS # BLD AUTO: 0.1 10E9/L (ref 0–0.2)
BASOPHILS NFR BLD AUTO: 0.7 %
DIFFERENTIAL METHOD BLD: ABNORMAL
EOSINOPHIL # BLD AUTO: 0.3 10E9/L (ref 0–0.7)
EOSINOPHIL NFR BLD AUTO: 4.1 %
ERYTHROCYTE [DISTWIDTH] IN BLOOD BY AUTOMATED COUNT: 17.2 % (ref 10–15)
HCT VFR BLD AUTO: 38.6 % (ref 40–53)
HGB BLD-MCNC: 12.8 G/DL (ref 13.3–17.7)
LYMPHOCYTES # BLD AUTO: 1.5 10E9/L (ref 0.8–5.3)
LYMPHOCYTES NFR BLD AUTO: 21.3 %
MCH RBC QN AUTO: 30 PG (ref 26.5–33)
MCHC RBC AUTO-ENTMCNC: 33.2 G/DL (ref 31.5–36.5)
MCV RBC AUTO: 91 FL (ref 78–100)
MONOCYTES # BLD AUTO: 1.3 10E9/L (ref 0–1.3)
MONOCYTES NFR BLD AUTO: 18.5 %
NEUTROPHILS # BLD AUTO: 3.8 10E9/L (ref 1.6–8.3)
NEUTROPHILS NFR BLD AUTO: 55.4 %
PLATELET # BLD AUTO: 224 10E9/L (ref 150–450)
RBC # BLD AUTO: 4.26 10E12/L (ref 4.4–5.9)
WBC # BLD AUTO: 6.9 10E9/L (ref 4–11)

## 2020-02-11 PROCEDURE — 85025 COMPLETE CBC W/AUTO DIFF WBC: CPT | Mod: ZL | Performed by: INTERNAL MEDICINE

## 2020-02-11 PROCEDURE — 99213 OFFICE O/P EST LOW 20 MIN: CPT | Performed by: INTERNAL MEDICINE

## 2020-02-11 PROCEDURE — G0463 HOSPITAL OUTPT CLINIC VISIT: HCPCS

## 2020-02-11 PROCEDURE — 36415 COLL VENOUS BLD VENIPUNCTURE: CPT | Mod: ZL | Performed by: INTERNAL MEDICINE

## 2020-02-11 ASSESSMENT — PAIN SCALES - GENERAL: PAINLEVEL: NO PAIN (0)

## 2020-02-11 NOTE — NURSING NOTE
"Chief Complaint   Patient presents with     Gastrointestinal Problem       Initial /72 (BP Location: Left arm, Patient Position: Chair, Cuff Size: Adult Regular)   Pulse 60   Temp 96.1  F (35.6  C) (Tympanic)   Wt 71.7 kg (158 lb)   SpO2 98%   BMI 24.75 kg/m   Estimated body mass index is 24.75 kg/m  as calculated from the following:    Height as of 1/8/20: 1.702 m (5' 7\").    Weight as of this encounter: 71.7 kg (158 lb).  Medication Reconciliation: complete  GAIL GONZALES LPN    "

## 2020-02-11 NOTE — PROGRESS NOTES
Subjective     Christian Akers is a 89 year old male who presents to clinic today for the following health issues:    HPI   Gastrointestinal symptoms      Duration: Follow up    Description:           BLEEDING - black/tarry stools      Intensity:  moderate    Accompanying signs and symptoms:  none    History  Previous {similar problem: YES  Previous evaluation:  yes    Aggravating factors: none     Alleviating factors: stopping iron    Other Therapies tried: patient states he is currently taking the iron supplement    Victor M presents again today for follow up.  I have seen him recently for the concern of black tarry stools.  He did have two hemoglobins drawn recently and it has been stable.  He also held the Iron pills and the black tarry stools ceased.   He is back on the iron and now again has black tarry stools.  In addition he was planning to have some dental procedure done but he was told he cannot stop his Asa or Plavix for 1 year.        Patient Active Problem List   Diagnosis     History of calcium pyrophosphate deposition disease (CPPD)     Encounter for therapeutic drug monitoring     Anemia     Heart block AV third degree (H)     Coronary artery disease involving native coronary artery of native heart without angina pectoris     Past Surgical History:   Procedure Laterality Date     KNEE SURGERY Left     knee replacement       Social History     Tobacco Use     Smoking status: Former Smoker     Smokeless tobacco: Never Used   Substance Use Topics     Alcohol use: Not on file     Family History   Problem Relation Age of Onset     Cancer Mother      Cerebrovascular Disease Mother      Cancer Father          Current Outpatient Medications   Medication Sig Dispense Refill     alendronate (FOSAMAX) 70 MG tablet Take 1 tablet (70 mg) by mouth once a week Takes every Sunday 12 tablet 3     Ascorbic Acid (VITAMIN C) 500 MG CAPS Take 1 capsule by mouth daily       ASPIRIN PO Take 81 mg by mouth daily        atorvastatin (LIPITOR) 40 MG tablet Take 40 mg by mouth daily       brimonidine (ALPHAGAN-P) 0.15 % ophthalmic solution Place 1 drop into both eyes 2 times daily        calcium-vitamin D (CALTRATE) 600-400 MG-UNIT per tablet Take 1 tablet by mouth 2 times daily 60 tablet 11     cholecalciferol (VITAMIN D3) 1000 UNIT tablet Take 1 tablet (1,000 Units) by mouth daily 30 tablet 11     clopidogrel (PLAVIX) 75 MG tablet Take 75 mg by mouth daily       cyanocobalamin (VITAMIN B-12) 1000 MCG tablet Take 100 mcg by mouth daily       ferrous sulfate (FEROSUL) 325 (65 Fe) MG tablet Take 325 mg by mouth daily (with breakfast)       FOLIC ACID PO Take 800 mcg by mouth daily       GNP GARLIC EXTRACT PO Take 1,000 mg by mouth daily       Magnesium Oxide 250 MG TABS Take 1 tablet by mouth daily       methotrexate 2.5 MG tablet Take 6 tablets (15 mg) by mouth once a week 84 tablet 5     metoprolol tartrate (LOPRESSOR) 25 MG tablet Take 25 mg by mouth 2 times daily       Multiple Vitamins-Minerals (MULTIVITAMIN ADULTS 50+ PO) Take by mouth daily       nitroGLYcerin (NITROSTAT) 0.4 MG sublingual tablet Place 0.4 mg under the tongue       OMEPRAZOLE PO Take 20 mg by mouth daily       predniSONE (DELTASONE) 1 MG tablet Take 3 mg by mouth daily       sildenafil (REVATIO) 20 MG tablet Take 20 mg by mouth 1 pill at least 6 hours apart-  Treats pulmonary arterial hypertension       tamsulosin (FLOMAX) 0.4 MG capsule Take 0.8 mg by mouth daily       Allergies   Allergen Reactions     Cialis  [Tadalafil] Other (See Comments)     BP Readings from Last 3 Encounters:   02/11/20 124/72   01/08/20 122/64   10/18/19 (!) 150/75    Wt Readings from Last 3 Encounters:   02/11/20 71.7 kg (158 lb)   01/08/20 70.3 kg (155 lb)   10/18/19 73 kg (161 lb)               Reviewed and updated as needed this visit by Provider         Review of Systems   ROS COMP: Constitutional, HEENT, cardiovascular, pulmonary, gi and gu systems are negative, except as  otherwise noted.      Objective    /72 (BP Location: Left arm, Patient Position: Chair, Cuff Size: Adult Regular)   Pulse 60   Temp 96.1  F (35.6  C) (Tympanic)   Wt 71.7 kg (158 lb)   SpO2 98%   BMI 24.75 kg/m    Body mass index is 24.75 kg/m .  Physical Exam   GENERAL: Alert and no distress  RESP: lungs clear to auscultation - no rales, rhonchi or wheezes  CV: regular rate and rhythm, normal S1 S2, no S3 or S4, no murmur, click or rub, no peripheral edema and peripheral pulses strong  ABDOMEN: soft, nontender, no hepatosplenomegaly, no masses and bowel sounds normal  MS: no gross musculoskeletal defects noted, no edema  SKIN: no suspicious lesions or rashes  NEURO: No focal deficits   PSYCH: mentation appears normal, affect normal/bright    Diagnostic Test Results:  Results for orders placed or performed in visit on 02/11/20 (from the past 24 hour(s))   CBC with platelets and differential   Result Value Ref Range    WBC 6.9 4.0 - 11.0 10e9/L    RBC Count 4.26 (L) 4.4 - 5.9 10e12/L    Hemoglobin 12.8 (L) 13.3 - 17.7 g/dL    Hematocrit 38.6 (L) 40.0 - 53.0 %    MCV 91 78 - 100 fl    MCH 30.0 26.5 - 33.0 pg    MCHC 33.2 31.5 - 36.5 g/dL    RDW 17.2 (H) 10.0 - 15.0 %    Platelet Count 224 150 - 450 10e9/L    % Neutrophils 55.4 %    % Lymphocytes 21.3 %    % Monocytes 18.5 %    % Eosinophils 4.1 %    % Basophils 0.7 %    Absolute Neutrophil 3.8 1.6 - 8.3 10e9/L    Absolute Lymphocytes 1.5 0.8 - 5.3 10e9/L    Absolute Monocytes 1.3 0.0 - 1.3 10e9/L    Absolute Eosinophils 0.3 0.0 - 0.7 10e9/L    Absolute Basophils 0.1 0.0 - 0.2 10e9/L    Diff Method Automated Method      Results for orders placed or performed in visit on 02/11/20   CBC with platelets and differential     Status: Abnormal   Result Value Ref Range    WBC 6.9 4.0 - 11.0 10e9/L    RBC Count 4.26 (L) 4.4 - 5.9 10e12/L    Hemoglobin 12.8 (L) 13.3 - 17.7 g/dL    Hematocrit 38.6 (L) 40.0 - 53.0 %    MCV 91 78 - 100 fl    MCH 30.0 26.5 - 33.0 pg     MCHC 33.2 31.5 - 36.5 g/dL    RDW 17.2 (H) 10.0 - 15.0 %    Platelet Count 224 150 - 450 10e9/L    % Neutrophils 55.4 %    % Lymphocytes 21.3 %    % Monocytes 18.5 %    % Eosinophils 4.1 %    % Basophils 0.7 %    Absolute Neutrophil 3.8 1.6 - 8.3 10e9/L    Absolute Lymphocytes 1.5 0.8 - 5.3 10e9/L    Absolute Monocytes 1.3 0.0 - 1.3 10e9/L    Absolute Eosinophils 0.3 0.0 - 0.7 10e9/L    Absolute Basophils 0.1 0.0 - 0.2 10e9/L    Diff Method Automated Method            Assessment & Plan   Problem List Items Addressed This Visit     None      Visit Diagnoses     Dark stools    -  Primary    Relevant Orders    CBC with platelets and differential (Completed)                 Christian Patel DO  [facility] [place]

## 2020-02-19 DIAGNOSIS — Z87.39 HX OF CALCIUM PYROPHOSPHATE DEPOSITION DISEASE (CPPD): ICD-10-CM

## 2020-02-19 DIAGNOSIS — Z51.81 ENCOUNTER FOR THERAPEUTIC DRUG MONITORING: ICD-10-CM

## 2020-02-19 DIAGNOSIS — D64.9 ANEMIA: ICD-10-CM

## 2020-02-19 DIAGNOSIS — M35.3 POLYMYALGIA RHEUMATICA (H): ICD-10-CM

## 2020-02-19 LAB — CRP SERPL-MCNC: 11.1 MG/L (ref 0–8)

## 2020-02-19 PROCEDURE — 86140 C-REACTIVE PROTEIN: CPT | Mod: ZL | Performed by: INTERNAL MEDICINE

## 2020-02-19 PROCEDURE — 36415 COLL VENOUS BLD VENIPUNCTURE: CPT | Mod: ZL | Performed by: INTERNAL MEDICINE

## 2020-03-02 ENCOUNTER — TELEPHONE (OUTPATIENT)
Dept: INTERNAL MEDICINE | Facility: OTHER | Age: 85
End: 2020-03-02

## 2020-03-02 ENCOUNTER — HEALTH MAINTENANCE LETTER (OUTPATIENT)
Age: 85
End: 2020-03-02

## 2020-03-02 NOTE — TELEPHONE ENCOUNTER
I cannot tell him to stop his Plavix and Asa as he needs to be on these for 1 year following the stents. They should ask his cardiologist.

## 2020-03-03 NOTE — PATIENT INSTRUCTIONS
Preventive Health Recommendations:     See your health care provider every year to    Review health changes.     Discuss preventive care.      Review your medicines if your doctor has prescribed any.      Talk with your health care provider about whether you should have a test to screen for prostate cancer (PSA).    Every 3 years, have a diabetes test (fasting glucose). If you are at risk for diabetes, you should have this test more often.    Every 5 years, have a cholesterol test. Have this test more often if you are at risk for high cholesterol or heart disease.     Every 10 years, have a colonoscopy. Or, have a yearly FIT test (stool test). These exams will check for colon cancer.    Talk to with your health care provider about screening for Abdominal Aortic Aneurysm if you have a family history of AAA or have a history of smoking.    Shots:     Get a flu shot each year.     Get a tetanus shot every 10 years.     Talk to your doctor about your pneumonia vaccines. There are now two you should receive - Pneumovax (PPSV 23) and Prevnar (PCV 13).     Talk to your pharmacist about a shingles vaccine.     Talk to your doctor about the hepatitis B vaccine.  Nutrition:     Eat at least 5 servings of fruits and vegetables each day.     Eat whole-grain bread, whole-wheat pasta and brown rice instead of white grains and rice.     Get adequate Calcium and Vitamin D.   Lifestyle    Exercise for at least 150 minutes a week (30 minutes a day, 5 days a week). This will help you control your weight and prevent disease.     Limit alcohol to one drink per day.     No smoking.     Wear sunscreen to prevent skin cancer.    See your dentist every six months for an exam and cleaning.    See your eye doctor every 1 to 2 years to screen for conditions such as glaucoma, macular degeneration, cataracts, etc.    Personalized Prevention Plan  You are due for the preventive services outlined below.  Your care team is available to assist you  in scheduling these services.  If you have already completed any of these items, please share that information with your care team to update in your medical record.  Health Maintenance Due   Topic Date Due     Discuss Advance Care Planning  02/16/1930     Diptheria Tetanus Pertussis (DTAP/TDAP/TD) Vaccine (1 - Tdap) 02/16/1941     Annual Wellness Visit  02/16/1995     Pneumococcal Vaccine (1 of 2 - PCV13) 02/16/1995     Zoster (Shingles) Vaccine (2 of 3) 02/26/2008     Patient Education   Personalized Prevention Plan  You are due for the preventive services outlined below.  Your care team is available to assist you in scheduling these services.  If you have already completed any of these items, please share that information with your care team to update in your medical record.  Health Maintenance Due   Topic Date Due     Discuss Advance Care Planning  02/16/1930     Diptheria Tetanus Pertussis (DTAP/TDAP/TD) Vaccine (1 - Tdap) 02/16/1941     Annual Wellness Visit  02/16/1995     Pneumococcal Vaccine (1 of 2 - PCV13) 02/16/1995     Zoster (Shingles) Vaccine (2 of 3) 02/26/2008

## 2020-03-03 NOTE — PROGRESS NOTES
"3  SUBJECTIVE:   CC: Christian Akers is an 89 year old male who presents for preventive health visit.     Healthy Habits:    Do you get at least three servings of calcium containing foods daily (dairy, green leafy vegetables, etc.)? { :312586::\"yes\"}    Amount of exercise or daily activities, outside of work: { :067838}    Problems taking medications regularly { :670269::\"No\"}    Medication side effects: { :942275::\"No\"}    Have you had an eye exam in the past two years? { :043498}    Do you see a dentist twice per year? { :505786}    Do you have sleep apnea, excessive snoring or daytime drowsiness?{ :980314}  {Outside tests to abstract? :122596}    {additional problems to add (Optional):421988}    Today's PHQ-2 Score:   PHQ-2 ( 1999 [employer]) 2/11/2020 1/8/2020   Q1: Little interest or pleasure in doing things 0 0   Q2: Feeling down, depressed or hopeless 0 0   PHQ-2 Score 0 0   Q1: Little interest or pleasure in doing things - -   Q2: Feeling down, depressed or hopeless - -   PHQ-2 Score - -     {PHQ-2 LOOK IN ASSESSMENTS (Optional) :875112}  Abuse: Current or Past(Physical, Sexual or Emotional)- {YES/NO/NA:018402}  Do you feel safe in your environment? {YES/NO/NA:455864}    Have you ever done Advance Care Planning? (For example, a Health Directive, POLST, or a discussion with a medical provider or your loved ones about your wishes): { :369316}    Social History     Tobacco Use     Smoking status: Former Smoker     Smokeless tobacco: Never Used   Substance Use Topics     Alcohol use: Not on file     If you drink alcohol do you typically have >3 drinks per day or >7 drinks per week? {ETOH :934172}                      Last PSA: No results found for: PSA    Reviewed orders with patient. Reviewed health maintenance and updated orders accordingly - {Yes/No:204588::\"Yes\"}  {Chronicprobdata (Optional):080544}    Reviewed and updated as needed this visit by clinical staff         Reviewed and updated as needed this visit " "by Provider        {HISTORY OPTIONS (Optional):364379}    ROS:  { :330952::\"CONSTITUTIONAL: NEGATIVE for fever, chills, change in weight\",\"INTEGUMENTARY/SKIN: NEGATIVE for worrisome rashes, moles or lesions\",\"EYES: NEGATIVE for vision changes or irritation\",\"ENT: NEGATIVE for ear, mouth and throat problems\",\"RESP: NEGATIVE for significant cough or SOB\",\"CV: NEGATIVE for chest pain, palpitations or peripheral edema\",\"GI: NEGATIVE for nausea, abdominal pain, heartburn, or change in bowel habits\",\" male: negative for dysuria, hematuria, decreased urinary stream, erectile dysfunction, urethral discharge\",\"MUSCULOSKELETAL: NEGATIVE for significant arthralgias or myalgia\",\"NEURO: NEGATIVE for weakness, dizziness or paresthesias\",\"PSYCHIATRIC: NEGATIVE for changes in mood or affect\"}    OBJECTIVE:   There were no vitals taken for this visit.  EXAM:  {Exam Choices:677945}    {Diagnostic Test Results (Optional):465393::\"Diagnostic Test Results:\",\"Labs reviewed in Epic\"}    ASSESSMENT/PLAN:   {Diag Picklist:222930}    COUNSELING:  {MALE COUNSELING MESSAGES:697318::\"Reviewed preventive health counseling, as reflected in patient instructions\"}    Estimated body mass index is 24.75 kg/m  as calculated from the following:    Height as of 1/8/20: 1.702 m (5' 7\").    Weight as of 2/11/20: 71.7 kg (158 lb).    {Weight Management Plan (ACO) Complete if BMI is abnormal-  Ages 18-64  BMI >24.9.  Age 65+ with BMI <23 or >30 (Optional):876565}     reports that he has quit smoking. He has never used smokeless tobacco.  {Tobacco Cessation -- Complete if patient is a smoker (Optional):765918}    Counseling Resources:  ATP IV Guidelines  Pooled Cohorts Equation Calculator  FRAX Risk Assessment  ICSI Preventive Guidelines  Dietary Guidelines for Americans, 2010  USDA's MyPlate  ASA Prophylaxis  Lung CA Screening    Christian Patel, DO  [facility]  "

## 2020-03-10 ENCOUNTER — OFFICE VISIT (OUTPATIENT)
Dept: INTERNAL MEDICINE | Facility: OTHER | Age: 85
End: 2020-03-10
Attending: INTERNAL MEDICINE
Payer: COMMERCIAL

## 2020-03-10 VITALS
HEIGHT: 68 IN | OXYGEN SATURATION: 98 % | SYSTOLIC BLOOD PRESSURE: 136 MMHG | DIASTOLIC BLOOD PRESSURE: 62 MMHG | TEMPERATURE: 95.7 F | HEART RATE: 63 BPM | WEIGHT: 155 LBS | BODY MASS INDEX: 23.49 KG/M2

## 2020-03-10 DIAGNOSIS — Z00.00 ENCOUNTER FOR MEDICARE ANNUAL WELLNESS EXAM: ICD-10-CM

## 2020-03-10 DIAGNOSIS — R19.5 DARK STOOLS: ICD-10-CM

## 2020-03-10 DIAGNOSIS — Z00.00 ROUTINE GENERAL MEDICAL EXAMINATION AT A HEALTH CARE FACILITY: Primary | ICD-10-CM

## 2020-03-10 LAB
BASOPHILS # BLD AUTO: 0 10E9/L (ref 0–0.2)
BASOPHILS NFR BLD AUTO: 0.7 %
DIFFERENTIAL METHOD BLD: ABNORMAL
EOSINOPHIL # BLD AUTO: 0.2 10E9/L (ref 0–0.7)
EOSINOPHIL NFR BLD AUTO: 2.5 %
ERYTHROCYTE [DISTWIDTH] IN BLOOD BY AUTOMATED COUNT: 18 % (ref 10–15)
HCT VFR BLD AUTO: 39.1 % (ref 40–53)
HGB BLD-MCNC: 13 G/DL (ref 13.3–17.7)
LYMPHOCYTES # BLD AUTO: 1 10E9/L (ref 0.8–5.3)
LYMPHOCYTES NFR BLD AUTO: 17.3 %
MCH RBC QN AUTO: 30 PG (ref 26.5–33)
MCHC RBC AUTO-ENTMCNC: 33.2 G/DL (ref 31.5–36.5)
MCV RBC AUTO: 90 FL (ref 78–100)
MONOCYTES # BLD AUTO: 0.9 10E9/L (ref 0–1.3)
MONOCYTES NFR BLD AUTO: 14.2 %
NEUTROPHILS # BLD AUTO: 3.9 10E9/L (ref 1.6–8.3)
NEUTROPHILS NFR BLD AUTO: 65.3 %
PLATELET # BLD AUTO: 219 10E9/L (ref 150–450)
RBC # BLD AUTO: 4.33 10E12/L (ref 4.4–5.9)
WBC # BLD AUTO: 6 10E9/L (ref 4–11)

## 2020-03-10 PROCEDURE — 36415 COLL VENOUS BLD VENIPUNCTURE: CPT | Mod: ZL | Performed by: INTERNAL MEDICINE

## 2020-03-10 PROCEDURE — G0438 PPPS, INITIAL VISIT: HCPCS | Performed by: INTERNAL MEDICINE

## 2020-03-10 PROCEDURE — 85025 COMPLETE CBC W/AUTO DIFF WBC: CPT | Mod: ZL | Performed by: INTERNAL MEDICINE

## 2020-03-10 PROCEDURE — G0463 HOSPITAL OUTPT CLINIC VISIT: HCPCS

## 2020-03-10 ASSESSMENT — PATIENT HEALTH QUESTIONNAIRE - PHQ9: SUM OF ALL RESPONSES TO PHQ QUESTIONS 1-9: 0

## 2020-03-10 ASSESSMENT — PAIN SCALES - GENERAL: PAINLEVEL: NO PAIN (0)

## 2020-03-10 ASSESSMENT — MIFFLIN-ST. JEOR: SCORE: 1337.58

## 2020-03-10 NOTE — PROGRESS NOTES
"SUBJECTIVE:   Christian Akers is a 89 year old male who presents for Preventive Visit.      Are you in the first 12 months of your Medicare Part B coverage?      Physical Health:    In general, how would you rate your overall physical health? good    Outside of work, how many days during the week do you exercise? 6-7 days/week    Outside of work, approximately how many minutes a day do you exercise?less than 15 minutes    If you drink alcohol do you typically have >3 drinks per day or >7 drinks per week? No    Do you usually eat at least 4 servings of fruit and vegetables a day, include whole grains & fiber and avoid regularly eating high fat or \"junk\" foods? Yes    Do you have any problems taking medications regularly?  No    Do you have any side effects from medications? not applicable    Needs assistance for the following daily activities: no assistance needed    Which of the following safety concerns are present in your home?  none identified     Hearing impairment: No    In the past 6 months, have you been bothered by leaking of urine? NO    Mental Health:    In general, how would you rate your overall mental or emotional health? good  PHQ-2 Score: 0    Do you feel safe in your environment? Yes    Have you ever done Advance Care Planning? (For example, a Health Directive, POLST, or a discussion with a medical provider or your loved ones about your wishes): No, advance care planning information given to patient to review.  Patient declined advance care planning discussion at this time.    Additional concerns to address?      Fall risk:  Fallen 2 or more times in the past year?: No  Any fall with injury in the past year?: No  click delete button to remove this line now  Cognitive Screenin) Repeat 3 items - repeated all three  2) Clock draw: 1-, no 11 and a 12.  Asked to draw 11:10 which was done incorrectly.    3) 3 item recall: Recalls NO objects   Results: 0 items recalled: PROBABLE COGNITIVE " IMPAIRMENT    Mini-CogTM Copyright S Delia. Licensed by the author for use in [facility]; reprinted with permission ([email or web address]). All rights reserved.      Do you have sleep apnea, excessive snoring or daytime drowsiness?: marisol      Victor M is a pleasant 89 year old male with a history of NSTEMI/CAD s/p PCI and GUNJAN to mLAD complicated by complete heart just two days after requiring pacer placement.  He remains on Asa/Plavix and there has been some recent concern about need to pull some teeth.  However we can do this due to his Asa/Plavix.  Unfortunately Victor M is unclear why he is here again today.  Cognitive impairment is likely as demonstrated above.    I did call Victor M's daughter with his permission to discuss this.  She states she thinks he functions well on his own.  We discussed driving and she reports he does very well driving and that she has rode with him.  Patient's daughter also informed that I could not identify any abscess on exam.    He continue to report dark stools but remains on Iron.  His Hgb has been stable in the past.         Reviewed and updated as needed this visit by clinical staff  Tobacco  Allergies  Meds  Med Hx  Surg Hx  Fam Hx  Soc Hx        Reviewed and updated as needed this visit by Provider        Social History     Tobacco Use     Smoking status: Former Smoker     Smokeless tobacco: Never Used   Substance Use Topics     Alcohol use: Not on file                           Current providers sharing in care for this patient include: Rheumatology and Cardiology  Patient Care Team:  Christian Patel DO as PCP - General (Internal Medicine)  Clinic, [facility] as PCP  Christian Patel DO as Assigned PCP    The following health maintenance items are reviewed in Epic and correct as of today:  Health Maintenance   Topic Date Due     ADVANCE CARE PLANNING  02/16/1930     DTAP/TDAP/TD IMMUNIZATION (1 - Tdap) 02/16/1941     MEDICARE ANNUAL WELLNESS VISIT   02/16/1995     PNEUMOCOCCAL IMMUNIZATION 65+ LOW/MEDIUM RISK (1 of 2 - PCV13) 02/16/1995     ZOSTER IMMUNIZATION (2 of 3) 02/26/2008     FALL RISK ASSESSMENT  02/11/2021     PHQ-2  Completed     INFLUENZA VACCINE  Completed     IPV IMMUNIZATION  Aged Out     MENINGITIS IMMUNIZATION  Aged Out     Lab work is in process  Labs reviewed in EPIC  BP Readings from Last 3 Encounters:   03/10/20 136/62   02/11/20 124/72   01/08/20 122/64    Wt Readings from Last 3 Encounters:   03/10/20 70.3 kg (155 lb)   02/11/20 71.7 kg (158 lb)   01/08/20 70.3 kg (155 lb)                  Patient Active Problem List   Diagnosis     History of calcium pyrophosphate deposition disease (CPPD)     Encounter for therapeutic drug monitoring     Anemia     Heart block AV third degree (H)     Coronary artery disease involving native coronary artery of native heart without angina pectoris     Past Surgical History:   Procedure Laterality Date     KNEE SURGERY Left     knee replacement       Social History     Tobacco Use     Smoking status: Former Smoker     Smokeless tobacco: Never Used   Substance Use Topics     Alcohol use: Not on file     Family History   Problem Relation Age of Onset     Cancer Mother      Cerebrovascular Disease Mother      Cancer Father          Current Outpatient Medications   Medication Sig Dispense Refill     alendronate (FOSAMAX) 70 MG tablet Take 1 tablet (70 mg) by mouth once a week Takes every Sunday 12 tablet 3     Ascorbic Acid (VITAMIN C) 500 MG CAPS Take 1 capsule by mouth daily       ASPIRIN PO Take 81 mg by mouth daily       atorvastatin (LIPITOR) 40 MG tablet Take 40 mg by mouth daily       brimonidine (ALPHAGAN-P) 0.15 % ophthalmic solution Place 1 drop into both eyes 2 times daily        calcium-vitamin D (CALTRATE) 600-400 MG-UNIT per tablet Take 1 tablet by mouth 2 times daily 60 tablet 11     cholecalciferol (VITAMIN D3) 1000 UNIT tablet Take 1 tablet (1,000 Units) by mouth daily 30 tablet 11      "clopidogrel (PLAVIX) 75 MG tablet Take 75 mg by mouth daily       cyanocobalamin (VITAMIN B-12) 1000 MCG tablet Take 100 mcg by mouth daily       ferrous sulfate (FEROSUL) 325 (65 Fe) MG tablet Take 325 mg by mouth daily (with breakfast)       FOLIC ACID PO Take 800 mcg by mouth daily       GNP GARLIC EXTRACT PO Take 1,000 mg by mouth daily       Magnesium Oxide 250 MG TABS Take 1 tablet by mouth daily       methotrexate 2.5 MG tablet Take 6 tablets (15 mg) by mouth once a week 84 tablet 5     metoprolol tartrate (LOPRESSOR) 25 MG tablet Take 25 mg by mouth 2 times daily       Multiple Vitamins-Minerals (MULTIVITAMIN ADULTS 50+ PO) Take by mouth daily       nitroGLYcerin (NITROSTAT) 0.4 MG sublingual tablet Place 0.4 mg under the tongue       OMEPRAZOLE PO Take 20 mg by mouth daily       predniSONE (DELTASONE) 1 MG tablet Take 3 mg by mouth daily       sildenafil (REVATIO) 20 MG tablet Take 20 mg by mouth 1 pill at least 6 hours apart-  Treats pulmonary arterial hypertension       tamsulosin (FLOMAX) 0.4 MG capsule Take 0.8 mg by mouth daily       Allergies   Allergen Reactions     Cialis  [Tadalafil] Other (See Comments)         ROS:  Constitutional, HEENT, cardiovascular, pulmonary, gi and gu systems are negative, except as otherwise noted.    OBJECTIVE:   There were no vitals taken for this visit. Estimated body mass index is 24.75 kg/m  as calculated from the following:    Height as of 1/8/20: 1.702 m (5' 7\").    Weight as of 2/11/20: 71.7 kg (158 lb).  EXAM:   GENERAL: Alert and no distress  HENT: Poor dentition, lower teeth with carries but no abscess noted  RESP: lungs clear to auscultation - no rales, rhonchi or wheezes  CV: RRR with soft systolic murmur  MS: no gross musculoskeletal defects noted, no edema  SKIN: no suspicious lesions or rashes  NEURO: Normal strength and tone, mentation intact and speech normal    Diagnostic Test Results:  Labs reviewed in Epic    ASSESSMENT / PLAN:   Christian was seen " "today for physical.    Diagnoses and all orders for this visit:    Routine general medical examination at a health care facility    Encounter for Medicare annual wellness exam    Dark stools  -     CBC with platelets and differential        COUNSELING:  Reviewed preventive health counseling, as reflected in patient instructions       Dental care       Fall risk prevention       Discussion had with daughter via phone due to memory concerns.    Estimated body mass index is 24.75 kg/m  as calculated from the following:    Height as of 1/8/20: 1.702 m (5' 7\").    Weight as of 2/11/20: 71.7 kg (158 lb).         reports that he has quit smoking. He has never used smokeless tobacco.      Appropriate preventive services were discussed with this patient, including applicable screening as appropriate for cardiovascular disease, diabetes, osteopenia/osteoporosis, and glaucoma.  As appropriate for age/gender, discussed screening for colorectal cancer, prostate cancer, breast cancer, and cervical cancer. Checklist reviewing preventive services available has been given to the patient.    Reviewed patients plan of care and provided an AVS. The Intermediate Care Plan ( asthma action plan, low back pain action plan, and migraine action plan) for Christian meets the Care Plan requirement. This Care Plan has been established and reviewed with the Patient and daughter.    Counseling Resources:  ATP IV Guidelines  Pooled Cohorts Equation Calculator  Breast Cancer Risk Calculator  FRAX Risk Assessment  ICSI Preventive Guidelines  Dietary Guidelines for Americans, 2010  USDA's MyPlate  ASA Prophylaxis  Lung CA Screening    Christian Patel, DO  [facility] [place] [facility]  "

## 2020-07-14 DIAGNOSIS — Z87.39 HX OF CALCIUM PYROPHOSPHATE DEPOSITION DISEASE (CPPD): ICD-10-CM

## 2020-07-14 DIAGNOSIS — Z87.39 HISTORY OF CALCIUM PYROPHOSPHATE DEPOSITION DISEASE (CPPD): ICD-10-CM

## 2020-07-14 DIAGNOSIS — M72.2 PLANTAR FASCIITIS: ICD-10-CM

## 2020-07-14 DIAGNOSIS — M35.3 PMR (POLYMYALGIA RHEUMATICA) (H): ICD-10-CM

## 2020-07-14 DIAGNOSIS — Z51.81 ENCOUNTER FOR THERAPEUTIC DRUG MONITORING: ICD-10-CM

## 2020-07-14 DIAGNOSIS — D64.9 ANEMIA, UNSPECIFIED TYPE: ICD-10-CM

## 2020-07-16 RX ORDER — PREDNISONE 1 MG/1
3 TABLET ORAL DAILY
Qty: 180 TABLET | Refills: 3 | Status: SHIPPED | OUTPATIENT
Start: 2020-07-16 | End: 2020-10-17

## 2020-07-16 NOTE — TELEPHONE ENCOUNTER
METHOTREXATE 2.5 MG TABLET 2.5 TAB   Last Written Prescription Date:  10/18/2019  Last Fill Quantity: 84,   # refills: 5  Last Office Visit: 10/18/2019  Future Office visit:  None    CBC RESULTS:   Recent Labs   Lab Test 03/10/20  1031   WBC 6.0   RBC 4.33*   HGB 13.0*   HCT 39.1*   MCV 90   MCH 30.0   MCHC 33.2   RDW 18.0*          Creatinine   Date Value Ref Range Status   01/08/2020 1.07 0.66 - 1.25 mg/dL Final   ]    Liver Function Studies -   Recent Labs   Lab Test 01/08/20  0952  08/13/18  1240 04/27/18  0953   PROTTOTAL  --   --   --  7.3   ALBUMIN  --   --  3.8 3.4   BILITOTAL  --   --  0.4 0.3   ALKPHOS  --   --  77 83   AST 18   < > 21 20   ALT 29   < > 23 28    < > = values in this interval not displayed.       Routing refill request to provider for review/approval because:  Drug not on the [facility], [facility] or [facility] refill protocol or controlled substance        PREDNISONE 1 MG TAB 1 TAB   Last Written Prescription Date:  ?  Last Fill Quantity: ?,   # refills: ?  Last Office Visit : 10/18/2019  Future Office visit:  None    Routing refill request to provider for review/approval because:  Medication is reported/historical      Charla Stanton RN  Central Triage Red Flags/Med Refills

## 2020-07-20 ENCOUNTER — OFFICE VISIT (OUTPATIENT)
Dept: INTERNAL MEDICINE | Facility: OTHER | Age: 85
End: 2020-07-20
Attending: INTERNAL MEDICINE
Payer: MEDICARE

## 2020-07-20 VITALS
SYSTOLIC BLOOD PRESSURE: 110 MMHG | WEIGHT: 148 LBS | TEMPERATURE: 96.2 F | DIASTOLIC BLOOD PRESSURE: 71 MMHG | BODY MASS INDEX: 22.5 KG/M2 | OXYGEN SATURATION: 97 % | HEART RATE: 64 BPM

## 2020-07-20 DIAGNOSIS — D50.8 OTHER IRON DEFICIENCY ANEMIA: Primary | ICD-10-CM

## 2020-07-20 DIAGNOSIS — E55.9 VITAMIN D DEFICIENCY: ICD-10-CM

## 2020-07-20 DIAGNOSIS — M35.3 PMR (POLYMYALGIA RHEUMATICA) (H): ICD-10-CM

## 2020-07-20 DIAGNOSIS — D64.9 ANEMIA, UNSPECIFIED TYPE: ICD-10-CM

## 2020-07-20 DIAGNOSIS — Z87.39 HISTORY OF CALCIUM PYROPHOSPHATE DEPOSITION DISEASE (CPPD): ICD-10-CM

## 2020-07-20 DIAGNOSIS — Z51.81 ENCOUNTER FOR THERAPEUTIC DRUG MONITORING: ICD-10-CM

## 2020-07-20 DIAGNOSIS — Z87.39 HX OF CALCIUM PYROPHOSPHATE DEPOSITION DISEASE (CPPD): ICD-10-CM

## 2020-07-20 DIAGNOSIS — M72.2 PLANTAR FASCIITIS: ICD-10-CM

## 2020-07-20 LAB
BASOPHILS # BLD AUTO: 0 10E9/L (ref 0–0.2)
BASOPHILS NFR BLD AUTO: 0.3 %
DIFFERENTIAL METHOD BLD: ABNORMAL
EOSINOPHIL # BLD AUTO: 0.1 10E9/L (ref 0–0.7)
EOSINOPHIL NFR BLD AUTO: 0.8 %
ERYTHROCYTE [DISTWIDTH] IN BLOOD BY AUTOMATED COUNT: 15.8 % (ref 10–15)
HCT VFR BLD AUTO: 38.9 % (ref 40–53)
HGB BLD-MCNC: 12.9 G/DL (ref 13.3–17.7)
LYMPHOCYTES # BLD AUTO: 0.9 10E9/L (ref 0.8–5.3)
LYMPHOCYTES NFR BLD AUTO: 14.2 %
MCH RBC QN AUTO: 31.3 PG (ref 26.5–33)
MCHC RBC AUTO-ENTMCNC: 33.2 G/DL (ref 31.5–36.5)
MCV RBC AUTO: 94 FL (ref 78–100)
MONOCYTES # BLD AUTO: 0.6 10E9/L (ref 0–1.3)
MONOCYTES NFR BLD AUTO: 8.5 %
NEUTROPHILS # BLD AUTO: 5.1 10E9/L (ref 1.6–8.3)
NEUTROPHILS NFR BLD AUTO: 76.2 %
PLATELET # BLD AUTO: 214 10E9/L (ref 150–450)
RBC # BLD AUTO: 4.12 10E12/L (ref 4.4–5.9)
WBC # BLD AUTO: 6.6 10E9/L (ref 4–11)

## 2020-07-20 PROCEDURE — 99213 OFFICE O/P EST LOW 20 MIN: CPT | Performed by: INTERNAL MEDICINE

## 2020-07-20 PROCEDURE — 83540 ASSAY OF IRON: CPT | Mod: ZL | Performed by: INTERNAL MEDICINE

## 2020-07-20 PROCEDURE — 36415 COLL VENOUS BLD VENIPUNCTURE: CPT | Mod: ZL | Performed by: INTERNAL MEDICINE

## 2020-07-20 PROCEDURE — 85025 COMPLETE CBC W/AUTO DIFF WBC: CPT | Mod: ZL | Performed by: INTERNAL MEDICINE

## 2020-07-20 PROCEDURE — 80053 COMPREHEN METABOLIC PANEL: CPT | Mod: ZL | Performed by: INTERNAL MEDICINE

## 2020-07-20 PROCEDURE — G0463 HOSPITAL OUTPT CLINIC VISIT: HCPCS

## 2020-07-20 PROCEDURE — 83550 IRON BINDING TEST: CPT | Mod: ZL | Performed by: INTERNAL MEDICINE

## 2020-07-20 PROCEDURE — 82306 VITAMIN D 25 HYDROXY: CPT | Mod: ZL | Performed by: INTERNAL MEDICINE

## 2020-07-20 PROCEDURE — 82607 VITAMIN B-12: CPT | Mod: ZL | Performed by: INTERNAL MEDICINE

## 2020-07-20 ASSESSMENT — PAIN SCALES - GENERAL: PAINLEVEL: NO PAIN (0)

## 2020-07-20 NOTE — NURSING NOTE
"Chief Complaint   Patient presents with     Hypertension     Heart Problem       Initial /71 (BP Location: Left arm, Patient Position: Chair, Cuff Size: Adult Regular)   Pulse 64   Temp 96.2  F (35.7  C) (Tympanic)   Wt 67.1 kg (148 lb)   SpO2 97%   BMI 22.50 kg/m   Estimated body mass index is 22.5 kg/m  as calculated from the following:    Height as of 3/10/20: 1.727 m (5' 8\").    Weight as of this encounter: 67.1 kg (148 lb).  Medication Reconciliation: complete  GAIL GONZALES LPN  "

## 2020-07-20 NOTE — PROGRESS NOTES
Subjective     Christian Akers is a 89 year old male who presents to clinic today for the following health issues:    HPI       Vascular Disease Follow-up      How often do you take nitroglycerin? Never    Do you take an aspirin every day? Yes    Hypertension Follow-up      Do you check your blood pressure regularly outside of the clinic? No     Are you following a low salt diet? Yes    Are your blood pressures ever more than 140 on the top number (systolic) OR more   than 90 on the bottom number (diastolic), for example 140/90? No    Victor M presents today for follow up.  He has a history of mild anemia and has reported melena in the past.  However his Hgb has never dropped significantly and we are uncertain if it is true melena as he is taking iron.  He would like his hydration status and Vit D also checked today.      Patient Active Problem List   Diagnosis     History of calcium pyrophosphate deposition disease (CPPD)     Encounter for therapeutic drug monitoring     Anemia     Heart block AV third degree (H)     Coronary artery disease involving native coronary artery of native heart without angina pectoris     Past Surgical History:   Procedure Laterality Date     KNEE SURGERY Left     knee replacement       Social History     Tobacco Use     Smoking status: Former Smoker     Smokeless tobacco: Never Used   Substance Use Topics     Alcohol use: Not on file     Family History   Problem Relation Age of Onset     Cancer Mother      Cerebrovascular Disease Mother      Cancer Father          Current Outpatient Medications   Medication Sig Dispense Refill     alendronate (FOSAMAX) 70 MG tablet Take 1 tablet (70 mg) by mouth once a week Takes every Sunday 12 tablet 3     Ascorbic Acid (VITAMIN C) 500 MG CAPS Take 1 capsule by mouth daily       ASPIRIN PO Take 81 mg by mouth daily       atorvastatin (LIPITOR) 40 MG tablet Take 40 mg by mouth daily       brimonidine (ALPHAGAN-P) 0.15 % ophthalmic solution Place 1 drop  into both eyes 2 times daily        calcium-vitamin D (CALTRATE) 600-400 MG-UNIT per tablet Take 1 tablet by mouth 2 times daily 60 tablet 11     cholecalciferol (VITAMIN D3) 1000 UNIT tablet Take 1 tablet (1,000 Units) by mouth daily 30 tablet 11     clopidogrel (PLAVIX) 75 MG tablet Take 75 mg by mouth daily       cyanocobalamin (VITAMIN B-12) 1000 MCG tablet Take 100 mcg by mouth daily       ferrous sulfate (FEROSUL) 325 (65 Fe) MG tablet Take 325 mg by mouth daily (with breakfast)       FOLIC ACID PO Take 800 mcg by mouth daily       GNP GARLIC EXTRACT PO Take 1,000 mg by mouth daily       Magnesium Oxide 250 MG TABS Take 1 tablet by mouth daily       methotrexate 2.5 MG tablet Take 6 tablets (15 mg) by mouth once a week 84 tablet 2     metoprolol tartrate (LOPRESSOR) 25 MG tablet Take 25 mg by mouth 2 times daily       Multiple Vitamins-Minerals (MULTIVITAMIN ADULTS 50+ PO) Take by mouth daily       nitroGLYcerin (NITROSTAT) 0.4 MG sublingual tablet Place 0.4 mg under the tongue       OMEPRAZOLE PO Take 20 mg by mouth daily       predniSONE (DELTASONE) 1 MG tablet Take 3 tablets (3 mg) by mouth daily 180 tablet 3     sildenafil (REVATIO) 20 MG tablet Take 20 mg by mouth 1 pill at least 6 hours apart-  Treats pulmonary arterial hypertension       tamsulosin (FLOMAX) 0.4 MG capsule Take 0.8 mg by mouth daily       Allergies   Allergen Reactions     Cialis  [Tadalafil] Other (See Comments)     BP Readings from Last 3 Encounters:   07/20/20 110/71   03/10/20 136/62   02/11/20 124/72    Wt Readings from Last 3 Encounters:   07/20/20 67.1 kg (148 lb)   03/10/20 70.3 kg (155 lb)   02/11/20 71.7 kg (158 lb)                    Reviewed and updated as needed this visit by Provider         Review of Systems   Constitutional, HEENT, cardiovascular, pulmonary, gi and gu systems are negative, except as otherwise noted.      Objective    /71 (BP Location: Left arm, Patient Position: Chair, Cuff Size: Adult Regular)    Pulse 64   Temp 96.2  F (35.7  C) (Tympanic)   Wt 67.1 kg (148 lb)   SpO2 97%   BMI 22.50 kg/m    Body mass index is 22.5 kg/m .  Physical Exam   GENERAL: Alert and no distress  RESP: lungs clear to auscultation - no rales, rhonchi or wheezes  CV: regular rate and rhythm, normal S1 S2, no S3 or S4, no murmur, click or rub, no peripheral edema and peripheral pulses strong  MS: no gross musculoskeletal defects noted, no edema  SKIN: no suspicious lesions or rashes  NEURO: Normal strength and tone, mentation intact and speech normal  PSYCH: mentation appears normal, affect normal/bright    Diagnostic Test Results:  Labs reviewed in Epic        Assessment & Plan   Problem List Items Addressed This Visit        Hematologic    Anemia - Primary    Relevant Orders    Iron and iron binding capacity (Completed)    CBC with platelets and differential (Completed)    Vitamin D Deficiency (Completed)    Vitamin B12 (Completed)      Other Visit Diagnoses     Vitamin D deficiency        Relevant Orders    Vitamin D Deficiency (Completed)           Christian Patel,   [facility]

## 2020-07-21 LAB
ALBUMIN SERPL-MCNC: 3.8 G/DL (ref 3.4–5)
ALP SERPL-CCNC: 104 U/L (ref 40–150)
ALT SERPL W P-5'-P-CCNC: 27 U/L (ref 0–70)
ANION GAP SERPL CALCULATED.3IONS-SCNC: 7 MMOL/L (ref 3–14)
AST SERPL W P-5'-P-CCNC: 23 U/L (ref 0–45)
BILIRUB SERPL-MCNC: 0.5 MG/DL (ref 0.2–1.3)
BUN SERPL-MCNC: 22 MG/DL (ref 7–30)
CALCIUM SERPL-MCNC: 9.4 MG/DL (ref 8.5–10.1)
CHLORIDE SERPL-SCNC: 103 MMOL/L (ref 94–109)
CO2 SERPL-SCNC: 26 MMOL/L (ref 20–32)
CREAT SERPL-MCNC: 1.19 MG/DL (ref 0.66–1.25)
GFR SERPL CREATININE-BSD FRML MDRD: 53 ML/MIN/{1.73_M2}
GLUCOSE SERPL-MCNC: 121 MG/DL (ref 70–99)
IRON SATN MFR SERPL: 19 % (ref 15–46)
IRON SERPL-MCNC: 47 UG/DL (ref 35–180)
POTASSIUM SERPL-SCNC: 4.3 MMOL/L (ref 3.4–5.3)
PROT SERPL-MCNC: 7.1 G/DL (ref 6.8–8.8)
SODIUM SERPL-SCNC: 136 MMOL/L (ref 133–144)
TIBC SERPL-MCNC: 244 UG/DL (ref 240–430)
VIT B12 SERPL-MCNC: 900 PG/ML (ref 193–986)

## 2020-07-22 LAB — DEPRECATED CALCIDIOL+CALCIFEROL SERPL-MC: 77 UG/L (ref 20–75)

## 2020-07-24 NOTE — TELEPHONE ENCOUNTER
alendronate (FOSAMAX) 70 MG tablet    Take 1 tablet (70 mg) by mouth once a week      Last Written Prescription Date:  11/19/19  Last Fill Quantity: 12,   # refills: 3  Last Office Visit : 10/18/19  Future Office visit:  none    Routing refill request to provider for review/approval because:  Failed protocol - overdue Dexa Scan

## 2020-07-26 RX ORDER — ALENDRONATE SODIUM 70 MG/1
70 TABLET ORAL WEEKLY
Qty: 12 TABLET | Refills: 0 | Status: SHIPPED | OUTPATIENT
Start: 2020-07-26 | End: 2021-05-20

## 2020-08-28 ENCOUNTER — DOCUMENTATION ONLY (OUTPATIENT)
Dept: INTERNAL MEDICINE | Facility: OTHER | Age: 85
End: 2020-08-28

## 2020-08-28 DIAGNOSIS — R79.89 ABNORMAL C-REACTIVE PROTEIN: ICD-10-CM

## 2020-08-28 DIAGNOSIS — R79.89 ABNORMAL C-REACTIVE PROTEIN: Primary | ICD-10-CM

## 2020-08-28 LAB — CRP SERPL-MCNC: 7.1 MG/L (ref 0–8)

## 2020-08-28 PROCEDURE — 86140 C-REACTIVE PROTEIN: CPT | Mod: ZL | Performed by: FAMILY MEDICINE

## 2020-08-28 PROCEDURE — 36415 COLL VENOUS BLD VENIPUNCTURE: CPT | Mod: ZL | Performed by: FAMILY MEDICINE

## 2020-10-16 ENCOUNTER — VIRTUAL VISIT (OUTPATIENT)
Dept: RHEUMATOLOGY | Facility: CLINIC | Age: 85
End: 2020-10-16
Attending: INTERNAL MEDICINE
Payer: COMMERCIAL

## 2020-10-16 DIAGNOSIS — M35.3 PMR (POLYMYALGIA RHEUMATICA) (H): ICD-10-CM

## 2020-10-16 DIAGNOSIS — D64.9 ANEMIA, UNSPECIFIED TYPE: ICD-10-CM

## 2020-10-16 DIAGNOSIS — Z51.81 ENCOUNTER FOR THERAPEUTIC DRUG MONITORING: ICD-10-CM

## 2020-10-16 DIAGNOSIS — M72.2 PLANTAR FASCIITIS: ICD-10-CM

## 2020-10-16 DIAGNOSIS — Z87.39 HX OF CALCIUM PYROPHOSPHATE DEPOSITION DISEASE (CPPD): ICD-10-CM

## 2020-10-16 DIAGNOSIS — Z87.39 HISTORY OF CALCIUM PYROPHOSPHATE DEPOSITION DISEASE (CPPD): ICD-10-CM

## 2020-10-16 PROCEDURE — 99214 OFFICE O/P EST MOD 30 MIN: CPT | Mod: 95 | Performed by: INTERNAL MEDICINE

## 2020-10-16 ASSESSMENT — PAIN SCALES - GENERAL: PAINLEVEL: NO PAIN (0)

## 2020-10-16 NOTE — LETTER
10/16/2020       RE: Christian Akers  [address] [facility] [address]     Dear Colleague,    Thank you for referring your patient, Christian Akers, to the [facility] RHEUMATOLOGY CLINIC [place] [facility]. Please see a copy of my visit note below.    [facility]  Rheumatology Clinic--remote  Tee Goyal MD  10/15/2020     Name: Christian Akers  MRN: [id number]  Age: 89 year old  : 1930  Referring provider: Luana Martinez    Assessment and Plan:  #Polymyalgia Rheumatica:  Patient relates no shoulder or hip girdle symptoms, and denies headaches or visual changes.  No physical examination is conducted due to the telephone nature of the visit in the time of the coronavirus epidemic. Blood work in summer 2020 showed a CRP of 7.1; transaminases were normal.  CBC showed hemoglobin of 12.9, and platelets of 214,000.  Creatinine was 1.19.     Polymyalgia rheumatica, by history, remains in remission. Prednisone could be tapered further, but patient has associated rising CRP in the past when tapering below current dose of 2 mg daily.  I think that safety issues associated with this ultralow dose of prednisone are minimal.  I recommend continuing 2 mg daily.  I recommend continuing methotrexate 15 mg once weekly. While on the drug, he shoulder undergo every 3-4 month LFTs, CBC, creatinine, and CRP.     #Long-term glucocorticoid use and insufficiency fracture risk: Patient remains vigorously physically active with weightbearing exercise every day, and his current dose of prednisone is less than that expected to be produced by the adrenal glands on a daily basis.  While I recommend that he continue vitamin D 800 international units and calcium carbonate 1200 mill equivalents daily supplementation, I believe that prophylaxis against glucocorticoid induced bone mineral density loss is no longer necessary.  He may discontinue alendronate.    # Health  "Maintenance:  Flu shot planned.    Follow-up: Return in about 6 months     HPI:   Christian Akers is a 89 year old male with a history including polyarticular inflammatory arthritis, pseudogout, and osteoarthritis who presents with his daughter for follow-up. I last evaluated the patient in , at which time polymyalgia rheumatica remained completely suppressed symptomatically.  I recommended that he taper prednisone to off, and continue methotrexate 15 mg weekly, at that visit.    Interval history 10-:  Patient's daughter accompanies patient on telephone visit today.  His health has been good. He is doing yoga twice a day. He has occasional headache, not severe. Sensations occur about once a day, lasting 5 minutes, improved after drinking water. No visual change, no jaw or tongue pain.    He had a tooth pulled 2 months ago; no problems.    He had a pacemaker placed in late 2019 after syncopal episode.   He continues methotrexate 15 mg weekly. He continues prednisone 2 mg daily.  When he last tried to taper prednisone, the blood test \"flared\".    Prior history      Today, the patient reports that he has been well recently without significant daily pain. He states that his headaches have resolved. He denies any issues with tolerating medication, morning stiffness, or recent medication changes as he has continued 4 mg prednisone daily and 15 mg methotrexate weekly. He notes that he has been able to complete 10 push ups every morning.     He has been taking Tylenol extra strength once daily as he was instructed to take this for pain, but he is unsure of why he takes it.    Of note, patient's daughter reports that a  CT scan of the chest was completed in December of 2016 which showed a few small nodules noted in the lungs and could be followed for in 12 months to discern stability. He notes that he has also been completing physical therapy for his knees which has been helpful as he has been able to " bowl.    Review of Systems:   Pertinent items are noted in HPI or as below, remainder of complete ROS is negative.      No recent problems with hearing or vision. No swallowing problems.   No breathing difficulty, shortness of breath, coughing, or wheezing.  No chest pain or palpitations.  No heart burn, indigestion, abdominal pain, nausea, vomiting, diarrhea.  No urination problems, no bloody, cloudy urine, no dysuria.  No numbing, tingling, weakness.  No headaches or confusion.  No rashes. No easy bleeding or bruising.     Active Medications:     Current Outpatient Medications   Medication     alendronate (FOSAMAX) 70 MG tablet     Ascorbic Acid (VITAMIN C) 500 MG CAPS     ASPIRIN PO     atorvastatin (LIPITOR) 40 MG tablet     brimonidine (ALPHAGAN-P) 0.15 % ophthalmic solution     calcium-vitamin D (CALTRATE) 600-400 MG-UNIT per tablet     cholecalciferol (VITAMIN D3) 1000 UNIT tablet     clopidogrel (PLAVIX) 75 MG tablet     cyanocobalamin (VITAMIN B-12) 1000 MCG tablet     ferrous sulfate (FEROSUL) 325 (65 Fe) MG tablet     FOLIC ACID PO     GNP GARLIC EXTRACT PO     Magnesium Oxide 250 MG TABS     methotrexate 2.5 MG tablet     metoprolol tartrate (LOPRESSOR) 25 MG tablet     Multiple Vitamins-Minerals (MULTIVITAMIN ADULTS 50+ PO)     OMEPRAZOLE PO     predniSONE (DELTASONE) 1 MG tablet     sildenafil (REVATIO) 20 MG tablet     tamsulosin (FLOMAX) 0.4 MG capsule     nitroGLYcerin (NITROSTAT) 0.4 MG sublingual tablet     No current facility-administered medications for this visit.        Current Facility-Administered Medications:      influenza Vac Split High-Dose (FLUZONE) injection 0.5 mL, 0.5 mL, Intramuscular, Once, Tee Goyal MD    Allergies:  Cialis     Past Medical History:  History of calcium pyrophosphate deposition disease; Acute pseudogout of left knee 11/2015   Encounter for therapeutic drug monitoring  Anemia  Hypertension  Hyperlipidemia  BPH with negative prostate biopsies in  2005  Unprovoked PE 2/2015, treated with anticoagulation for 12 months  Polymyalgia rheumatica  Osteoarthritis   UTI in 2004 with hemorrhagic cystitis in 2008  Borderline glaucoma    Osteopenia    Past Surgical History:  Left inguinal hernia repair  Cataract repair  Left total knee arthoplasty 4/2016  Bilateral temporal artery biopsies 11/2016-negative    Family History:    Father - cancer  Mother - cancer  Brother - prostate cancer  Sister - cancer     Social History:  The patient reports that he has quit smoking. He has never used smokeless tobacco. He reports that he drinks alcohol occasionally, around 1 drink per week.  PCP: Brenda Onofre  Marital Status:      Physical Exam:   There were no vitals taken for this visit.   Wt Readings from Last 4 Encounters:   07/20/20 67.1 kg (148 lb)   03/10/20 70.3 kg (155 lb)   02/11/20 71.7 kg (158 lb)   01/08/20 70.3 kg (155 lb)     Psych: Normal judgement, orientation, memory, affect.     Laboratory:   RHEUM RESULTS Latest Ref Rng & Units 3/10/2020 7/20/2020 8/28/2020   SED RATE 0 - 20 mm/h - - -   CRP, INFLAMMATION 0.0 - 8.0 mg/L - - 7.1   AST 0 - 45 U/L - 23 -   ALT 0 - 70 U/L - 27 -   ALBUMIN 3.4 - 5.0 g/dL - 3.8 -   WBC 4.0 - 11.0 10e9/L 6.0 6.6 -   RBC 4.4 - 5.9 10e12/L 4.33(L) 4.12(L) -   HGB 13.3 - 17.7 g/dL 13.0(L) 12.9(L) -   HCT 40.0 - 53.0 % 39.1(L) 38.9(L) -   MCV 78 - 100 fl 90 94 -   MCHC 31.5 - 36.5 g/dL 33.2 33.2 -   RDW 10.0 - 15.0 % 18.0(H) 15.8(H) -    - 450 10e9/L 219 214 -   CREATININE 0.66 - 1.25 mg/dL - 1.19 -   GFR ESTIMATE, IF BLACK >60 mL/min/[1.73:m2] - 62 -   GFR ESTIMATE >60 mL/min/[1.73:m2] - 53(L) -    - 1,620 mg/dL - - -   IGA 70 - 380 mg/dL - - -   IGM 60 - 265 mg/dL - - -     Albumin Fraction   Date Value Ref Range Status   02/17/2017 3.8 3.7 - 5.1 g/dL Final     Alpha 2 Fraction   Date Value Ref Range Status   02/17/2017 0.9 0.5 - 0.9 g/dL Final     Beta Fraction   Date Value Ref Range Status   02/17/2017  "0.6 0.6 - 1.0 g/dL Final     Gamma Fraction   Date Value Ref Range Status   02/17/2017 0.6 (L) 0.7 - 1.6 g/dL Final     Monoclonal Peak   Date Value Ref Range Status   02/17/2017 0.1 (H) 0.0 g/dL Final     ELP Interpretation:   Date Value Ref Range Status   02/17/2017   Final    Two very small monoclonal proteins (each about 0.1 g/dL or less) seen in the   gamma fraction.  See immunofixation report on same specimen. Pathologic   significance requires clinical correlation.  JOYCE Monroy M.D., Ph.D.,   Pathologist ().       Monoclonal Peak Urine   Date Value Ref Range Status   02/17/2017 0.0 0% % Final     Immunofixation ELP   Date Value Ref Range Status   02/17/2017   Final    (Note)  Monoclonal IgG immunoglobulin of kappa light chain type. Very  small monoclonal IgG immunoglobulin of lambda light chain type.  Monoclonal antibody therapeutics (e.g. Daratumumab) may appear as  monoclonal proteins on serum electrophoresis and immunofixation.  Results should be interpreted with caution if the patient is  receiving monoclonal antibody therapy. Pathological significance  requires clinical correlation.  JOYCE Monroy M.D., Ph.D.,  Pathologist ([phone])         IGG   Date Value Ref Range Status   02/17/2017 702 695 - 1,620 mg/dL Final     IGA   Date Value Ref Range Status   02/17/2017 119 70 - 380 mg/dL Final     IGM   Date Value Ref Range Status   02/17/2017 14 (L) 60 - 265 mg/dL Final       Patient states he is feeling good, continues to do his yoga daily.  His daughter will be present for todays phone visit.    Christian Akers is a 89 year old male who is being evaluated via a billable telephone visit.      The patient has been notified of following:     \"This telephone visit will be conducted via a call between you and your physician/provider. We have found that certain health care needs can be provided without the need for a physical exam.  This service lets us provide the care you need with a short " "phone conversation.  If a prescription is necessary we can send it directly to your pharmacy.  If lab work is needed we can place an order for that and you can then stop by our lab to have the test done at a later time.    Telephone visits are billed at different rates depending on your insurance coverage. During this emergency period, for some insurers they may be billed the same as an in-person visit.  Please reach out to your insurance provider with any questions.    If during the course of the call the physician/provider feels a telephone visit is not appropriate, you will not be charged for this service.\"    Patient has given verbal consent for Telephone visit?  Yes    What phone number would you like to be contacted at? [phone]    How would you like to [facility] your [facility]? [facility]    Phone call duration: 22 minutes    Tee Goyal MD              "

## 2020-10-17 RX ORDER — PREDNISONE 1 MG/1
3 TABLET ORAL DAILY
Qty: 180 TABLET | Refills: 3 | Status: SHIPPED | OUTPATIENT
Start: 2020-10-17 | End: 2021-07-30

## 2020-10-17 NOTE — PATIENT INSTRUCTIONS
Diagnosis:  1.  Polymyalgia rheumatica: No headaches, and no joint or muscle pain or stiffness.  I  that polymyalgia rheumatica is in remission, and recommend continuing immunosuppressive therapy.    Plan:  1.  Continue methotrexate 15 mg weekly.While on methotrexate:   -- Check labs every 3 months (AST/ALT, Albumin, CBC with platelets)   -- Limit alcohol intake to 2 drinks weekly; use folate 1 mg daily.  --Tylenol 500-1000 mg can be used as needed up to three times daily for nausea/headache associated with dosing.  2.  Continue prednisone 2 mg daily  3.  Discontinue alendronate  3.  Repeat CRP prior to next visit in 6 months.

## 2020-10-17 NOTE — PROGRESS NOTES
[facility]  Rheumatology Clinic--[facility]  Tee Goyal MD  10/15/2020     Name: Christian Akres  MRN: [id number]  Age: 89 year old  : 1930  Referring provider: Luana Martinez    Assessment and Plan:  #Polymyalgia Rheumatica:  Patient relates no shoulder or hip girdle symptoms, and denies headaches or visual changes.  No physical examination is conducted due to the telephone nature of the visit in the time of the coronavirus epidemic. Blood work in summer 2020 showed a CRP of 7.1; transaminases were normal.  CBC showed hemoglobin of 12.9, and platelets of 214,000.  Creatinine was 1.19.     Polymyalgia rheumatica, by history, remains in remission. Prednisone could be tapered further, but patient has associated rising CRP in the past when tapering below current dose of 2 mg daily.  I think that safety issues associated with this ultralow dose of prednisone are minimal.  I recommend continuing 2 mg daily.  I recommend continuing methotrexate 15 mg once weekly. While on the drug, he shoulder undergo every 3-4 month LFTs, CBC, creatinine, and CRP.     #Long-term glucocorticoid use and insufficiency fracture risk: Patient remains vigorously physically active with weightbearing exercise every day, and his current dose of prednisone is less than that expected to be produced by the adrenal glands on a daily basis.  While I recommend that he continue vitamin D 800 international units and calcium carbonate 1200 mill equivalents daily supplementation, I believe that prophylaxis against glucocorticoid induced bone mineral density loss is no longer necessary.  He may discontinue alendronate.    # Health Maintenance:  Flu shot planned.    Follow-up: Return in about 6 months     HPI:   Christian Akers is a 89 year old male with a history including polyarticular inflammatory arthritis, pseudogout, and osteoarthritis who presents with his daughter for follow-up. I last evaluated the patient in , at which time  "polymyalgia rheumatica remained completely suppressed symptomatically.  I recommended that he taper prednisone to off, and continue methotrexate 15 mg weekly, at that visit.    Interval history 10-:  Patient's daughter accompanies patient on telephone visit today.  His health has been good. He is doing yoga twice a day. He has occasional headache, not severe. Sensations occur about once a day, lasting 5 minutes, improved after drinking water. No visual change, no jaw or tongue pain.    He had a tooth pulled 2 months ago; no problems.    He had a pacemaker placed in late 2019 after syncopal episode.   He continues methotrexate 15 mg weekly. He continues prednisone 2 mg daily.  When he last tried to taper prednisone, the blood test \"flared\".    Prior history      Today, the patient reports that he has been well recently without significant daily pain. He states that his headaches have resolved. He denies any issues with tolerating medication, morning stiffness, or recent medication changes as he has continued 4 mg prednisone daily and 15 mg methotrexate weekly. He notes that he has been able to complete 10 push ups every morning.     He has been taking Tylenol extra strength once daily as he was instructed to take this for pain, but he is unsure of why he takes it.    Of note, patient's daughter reports that a  CT scan of the chest was completed in December of 2016 which showed a few small nodules noted in the lungs and could be followed for in 12 months to discern stability. He notes that he has also been completing physical therapy for his knees which has been helpful as he has been able to bowl.    Review of Systems:   Pertinent items are noted in HPI or as below, remainder of complete ROS is negative.      No recent problems with hearing or vision. No swallowing problems.   No breathing difficulty, shortness of breath, coughing, or wheezing.  No chest pain or palpitations.  No heart burn, " indigestion, abdominal pain, nausea, vomiting, diarrhea.  No urination problems, no bloody, cloudy urine, no dysuria.  No numbing, tingling, weakness.  No headaches or confusion.  No rashes. No easy bleeding or bruising.     Active Medications:     Current Outpatient Medications   Medication     alendronate (FOSAMAX) 70 MG tablet     Ascorbic Acid (VITAMIN C) 500 MG CAPS     ASPIRIN PO     atorvastatin (LIPITOR) 40 MG tablet     brimonidine (ALPHAGAN-P) 0.15 % ophthalmic solution     calcium-vitamin D (CALTRATE) 600-400 MG-UNIT per tablet     cholecalciferol (VITAMIN D3) 1000 UNIT tablet     clopidogrel (PLAVIX) 75 MG tablet     cyanocobalamin (VITAMIN B-12) 1000 MCG tablet     ferrous sulfate (FEROSUL) 325 (65 Fe) MG tablet     FOLIC ACID PO     GNP GARLIC EXTRACT PO     Magnesium Oxide 250 MG TABS     methotrexate 2.5 MG tablet     metoprolol tartrate (LOPRESSOR) 25 MG tablet     Multiple Vitamins-Minerals (MULTIVITAMIN ADULTS 50+ PO)     OMEPRAZOLE PO     predniSONE (DELTASONE) 1 MG tablet     sildenafil (REVATIO) 20 MG tablet     tamsulosin (FLOMAX) 0.4 MG capsule     nitroGLYcerin (NITROSTAT) 0.4 MG sublingual tablet     No current facility-administered medications for this visit.        Current Facility-Administered Medications:      influenza Vac Split High-Dose (FLUZONE) injection 0.5 mL, 0.5 mL, Intramuscular, Once, Tee Goyal MD    Allergies:  Cialis     Past Medical History:  History of calcium pyrophosphate deposition disease; Acute pseudogout of left knee 11/2015   Encounter for therapeutic drug monitoring  Anemia  Hypertension  Hyperlipidemia  BPH with negative prostate biopsies in 2005  Unprovoked PE 2/2015, treated with anticoagulation for 12 months  Polymyalgia rheumatica  Osteoarthritis   UTI in 2004 with hemorrhagic cystitis in 2008  Borderline glaucoma    Osteopenia    Past Surgical History:  Left inguinal hernia repair  Cataract repair  Left total knee arthoplasty 4/2016  Bilateral  temporal artery biopsies 11/2016-negative    Family History:    Father - cancer  Mother - cancer  Brother - prostate cancer  Sister - cancer     Social History:  The patient reports that he has quit smoking. He has never used smokeless tobacco. He reports that he drinks alcohol occasionally, around 1 drink per week.  PCP: Brenda Onofre  Marital Status:      Physical Exam:   There were no vitals taken for this visit.   Wt Readings from Last 4 Encounters:   07/20/20 67.1 kg (148 lb)   03/10/20 70.3 kg (155 lb)   02/11/20 71.7 kg (158 lb)   01/08/20 70.3 kg (155 lb)     Psych: Normal judgement, orientation, memory, affect.     Laboratory:   RHEUM RESULTS Latest Ref Rng & Units 3/10/2020 7/20/2020 8/28/2020   SED RATE 0 - 20 mm/h - - -   CRP, INFLAMMATION 0.0 - 8.0 mg/L - - 7.1   AST 0 - 45 U/L - 23 -   ALT 0 - 70 U/L - 27 -   ALBUMIN 3.4 - 5.0 g/dL - 3.8 -   WBC 4.0 - 11.0 10e9/L 6.0 6.6 -   RBC 4.4 - 5.9 10e12/L 4.33(L) 4.12(L) -   HGB 13.3 - 17.7 g/dL 13.0(L) 12.9(L) -   HCT 40.0 - 53.0 % 39.1(L) 38.9(L) -   MCV 78 - 100 fl 90 94 -   MCHC 31.5 - 36.5 g/dL 33.2 33.2 -   RDW 10.0 - 15.0 % 18.0(H) 15.8(H) -    - 450 10e9/L 219 214 -   CREATININE 0.66 - 1.25 mg/dL - 1.19 -   GFR ESTIMATE, IF BLACK >60 mL/min/[1.73:m2] - 62 -   GFR ESTIMATE >60 mL/min/[1.73:m2] - 53(L) -    - 1,620 mg/dL - - -   IGA 70 - 380 mg/dL - - -   IGM 60 - 265 mg/dL - - -     Albumin Fraction   Date Value Ref Range Status   02/17/2017 3.8 3.7 - 5.1 g/dL Final     Alpha 2 Fraction   Date Value Ref Range Status   02/17/2017 0.9 0.5 - 0.9 g/dL Final     Beta Fraction   Date Value Ref Range Status   02/17/2017 0.6 0.6 - 1.0 g/dL Final     Gamma Fraction   Date Value Ref Range Status   02/17/2017 0.6 (L) 0.7 - 1.6 g/dL Final     Monoclonal Peak   Date Value Ref Range Status   02/17/2017 0.1 (H) 0.0 g/dL Final     ELP Interpretation:   Date Value Ref Range Status   02/17/2017   Final    Two very small monoclonal proteins  "(each about 0.1 g/dL or less) seen in the   gamma fraction.  See immunofixation report on same specimen. Pathologic   significance requires clinical correlation.  JOYCE Monroy M.D., Ph.D.,   Pathologist ().       Monoclonal Peak Urine   Date Value Ref Range Status   02/17/2017 0.0 0% % Final     Immunofixation ELP   Date Value Ref Range Status   02/17/2017   Final    (Note)  Monoclonal IgG immunoglobulin of kappa light chain type. Very  small monoclonal IgG immunoglobulin of lambda light chain type.  Monoclonal antibody therapeutics (e.g. Daratumumab) may appear as  monoclonal proteins on serum electrophoresis and immunofixation.  Results should be interpreted with caution if the patient is  receiving monoclonal antibody therapy. Pathological significance  requires clinical correlation.  JOYCE Monroy M.D., Ph.D.,  Pathologist ([phone])         IGG   Date Value Ref Range Status   02/17/2017 702 695 - 1,620 mg/dL Final     IGA   Date Value Ref Range Status   02/17/2017 119 70 - 380 mg/dL Final     IGM   Date Value Ref Range Status   02/17/2017 14 (L) 60 - 265 mg/dL Final       Patient states he is feeling good, continues to do his yoga daily.  His daughter will be present for todays phone visit.    Christian Akers is a 89 year old male who is being evaluated via a billable telephone visit.      The patient has been notified of following:     \"This telephone visit will be conducted via a call between you and your physician/provider. We have found that certain health care needs can be provided without the need for a physical exam.  This service lets us provide the care you need with a short phone conversation.  If a prescription is necessary we can send it directly to your pharmacy.  If lab work is needed we can place an order for that and you can then stop by our lab to have the test done at a later time.    Telephone visits are billed at different rates depending on your insurance coverage. During " "this emergency period, for some insurers they may be billed the same as an in-person visit.  Please reach out to your insurance provider with any questions.    If during the course of the call the physician/provider feels a telephone visit is not appropriate, you will not be charged for this service.\"    Patient has given verbal consent for Telephone visit?  Yes    What phone number would you like to be contacted at? [phone]    How would you like to obtain your AVS? MyChart    Phone call duration: 22 minutes    Tee Goyal MD          "

## 2020-11-03 DIAGNOSIS — K21.00 GASTROESOPHAGEAL REFLUX DISEASE WITH ESOPHAGITIS, UNSPECIFIED WHETHER HEMORRHAGE: Primary | ICD-10-CM

## 2020-11-03 NOTE — TELEPHONE ENCOUNTER
Prilosec       Last Written Prescription Date:  Reported   Last Fill Quantity: 90,   # refills: 4  Last Office Visit: 7/20/2020  Future Office visit:

## 2020-12-15 NOTE — PROGRESS NOTES
Subjective     Christian Akers is a 89 year old male who presents to clinic today for the following health issues:    HPI         Concern - Polymyalgia Rheumatica    Onset: Follow up   Description: Follow up needs blood work follow up, patient seen Dr. Goyal at the [facility]  Intensity: mild  Progression of Symptoms:  same  Accompanying Signs & Symptoms: head pressure, denies pain   Previous history of similar problem: none   Precipitating factors:        Worsened by: none   Alleviating factors:        Improved by: none  Therapies tried and outcome: Methotrexate, prednisone     Victor M presents today for follow up and needs labs for the follow up of his PMR.  He otherwise has no complaints today.          Review of Systems   Constitutional, HEENT, cardiovascular, pulmonary, gi and gu systems are negative, except as otherwise noted.      Objective    There were no vitals taken for this visit.  There is no height or weight on file to calculate BMI.  Physical Exam   GENERAL: Alert and no distress  PSYCH: Alert but remains sightly confused.    No results found for this or any previous visit (from the past 24 hour(s)).  No results found for any visits on 12/16/20.  Orders Only on 08/28/2020   Component Date Value Ref Range Status     CRP Inflammation 08/28/2020 7.1  0.0 - 8.0 mg/L Final           Assessment & Plan   Problem List Items Addressed This Visit        Hematologic    Anemia    Relevant Orders    Iron and iron binding capacity (Completed)      Other Visit Diagnoses     PMR (polymyalgia rheumatica) (H)    -  Primary    Relevant Orders    ESR: Erythrocyte sedimentation rate (Completed)    CRP, inflammation (Completed)    Comprehensive metabolic panel (BMP + Alb, Alk Phos, ALT, AST, Total. Bili, TP) (Completed)    CBC with platelets and differential (Completed)    Screening for hypothyroidism        Relevant Orders    TSH with free T4 reflex (Completed)             Christian Patel, [facility]  - [place] [facility]

## 2020-12-16 ENCOUNTER — OFFICE VISIT (OUTPATIENT)
Dept: INTERNAL MEDICINE | Facility: OTHER | Age: 85
End: 2020-12-16
Attending: INTERNAL MEDICINE
Payer: MEDICARE

## 2020-12-16 VITALS
SYSTOLIC BLOOD PRESSURE: 116 MMHG | TEMPERATURE: 96.4 F | HEART RATE: 75 BPM | WEIGHT: 151 LBS | DIASTOLIC BLOOD PRESSURE: 78 MMHG | OXYGEN SATURATION: 97 % | BODY MASS INDEX: 22.96 KG/M2

## 2020-12-16 DIAGNOSIS — M35.3 PMR (POLYMYALGIA RHEUMATICA) (H): Primary | ICD-10-CM

## 2020-12-16 DIAGNOSIS — Z13.29 SCREENING FOR HYPOTHYROIDISM: ICD-10-CM

## 2020-12-16 DIAGNOSIS — D50.8 OTHER IRON DEFICIENCY ANEMIA: ICD-10-CM

## 2020-12-16 LAB
ALBUMIN SERPL-MCNC: 3.5 G/DL (ref 3.4–5)
ALP SERPL-CCNC: 110 U/L (ref 40–150)
ALT SERPL W P-5'-P-CCNC: 24 U/L (ref 0–70)
ANION GAP SERPL CALCULATED.3IONS-SCNC: 4 MMOL/L (ref 3–14)
AST SERPL W P-5'-P-CCNC: 28 U/L (ref 0–45)
BASOPHILS # BLD AUTO: 0 10E9/L (ref 0–0.2)
BASOPHILS NFR BLD AUTO: 0.4 %
BILIRUB SERPL-MCNC: 0.5 MG/DL (ref 0.2–1.3)
BUN SERPL-MCNC: 15 MG/DL (ref 7–30)
CALCIUM SERPL-MCNC: 9.5 MG/DL (ref 8.5–10.1)
CHLORIDE SERPL-SCNC: 103 MMOL/L (ref 94–109)
CO2 SERPL-SCNC: 30 MMOL/L (ref 20–32)
CREAT SERPL-MCNC: 1.01 MG/DL (ref 0.66–1.25)
CRP SERPL-MCNC: 18.1 MG/L (ref 0–8)
DIFFERENTIAL METHOD BLD: ABNORMAL
EOSINOPHIL # BLD AUTO: 0.1 10E9/L (ref 0–0.7)
EOSINOPHIL NFR BLD AUTO: 1.8 %
ERYTHROCYTE [DISTWIDTH] IN BLOOD BY AUTOMATED COUNT: 16.3 % (ref 10–15)
ERYTHROCYTE [SEDIMENTATION RATE] IN BLOOD BY WESTERGREN METHOD: 17 MM/H (ref 0–20)
GFR SERPL CREATININE-BSD FRML MDRD: 65 ML/MIN/{1.73_M2}
GLUCOSE SERPL-MCNC: 100 MG/DL (ref 70–99)
HCT VFR BLD AUTO: 38.4 % (ref 40–53)
HGB BLD-MCNC: 12.9 G/DL (ref 13.3–17.7)
IRON SATN MFR SERPL: 12 % (ref 15–46)
IRON SERPL-MCNC: 27 UG/DL (ref 35–180)
LYMPHOCYTES # BLD AUTO: 1.1 10E9/L (ref 0.8–5.3)
LYMPHOCYTES NFR BLD AUTO: 15.2 %
MCH RBC QN AUTO: 31.1 PG (ref 26.5–33)
MCHC RBC AUTO-ENTMCNC: 33.6 G/DL (ref 31.5–36.5)
MCV RBC AUTO: 93 FL (ref 78–100)
MONOCYTES # BLD AUTO: 0.9 10E9/L (ref 0–1.3)
MONOCYTES NFR BLD AUTO: 12.2 %
NEUTROPHILS # BLD AUTO: 5 10E9/L (ref 1.6–8.3)
NEUTROPHILS NFR BLD AUTO: 70.4 %
PLATELET # BLD AUTO: 244 10E9/L (ref 150–450)
POTASSIUM SERPL-SCNC: 4.2 MMOL/L (ref 3.4–5.3)
PROT SERPL-MCNC: 7.4 G/DL (ref 6.8–8.8)
RBC # BLD AUTO: 4.15 10E12/L (ref 4.4–5.9)
SODIUM SERPL-SCNC: 137 MMOL/L (ref 133–144)
TIBC SERPL-MCNC: 230 UG/DL (ref 240–430)
TSH SERPL DL<=0.005 MIU/L-ACNC: 3.91 MU/L (ref 0.4–4)
WBC # BLD AUTO: 7.1 10E9/L (ref 4–11)

## 2020-12-16 PROCEDURE — 85652 RBC SED RATE AUTOMATED: CPT | Mod: ZL | Performed by: INTERNAL MEDICINE

## 2020-12-16 PROCEDURE — 83540 ASSAY OF IRON: CPT | Mod: ZL | Performed by: INTERNAL MEDICINE

## 2020-12-16 PROCEDURE — 84443 ASSAY THYROID STIM HORMONE: CPT | Mod: ZL,GZ | Performed by: INTERNAL MEDICINE

## 2020-12-16 PROCEDURE — 36415 COLL VENOUS BLD VENIPUNCTURE: CPT | Mod: ZL | Performed by: INTERNAL MEDICINE

## 2020-12-16 PROCEDURE — 85025 COMPLETE CBC W/AUTO DIFF WBC: CPT | Mod: ZL | Performed by: INTERNAL MEDICINE

## 2020-12-16 PROCEDURE — G0463 HOSPITAL OUTPT CLINIC VISIT: HCPCS

## 2020-12-16 PROCEDURE — 83550 IRON BINDING TEST: CPT | Mod: ZL | Performed by: INTERNAL MEDICINE

## 2020-12-16 PROCEDURE — 99213 OFFICE O/P EST LOW 20 MIN: CPT | Performed by: INTERNAL MEDICINE

## 2020-12-16 PROCEDURE — 80053 COMPREHEN METABOLIC PANEL: CPT | Mod: ZL | Performed by: INTERNAL MEDICINE

## 2020-12-16 PROCEDURE — 86140 C-REACTIVE PROTEIN: CPT | Mod: ZL | Performed by: INTERNAL MEDICINE

## 2020-12-16 ASSESSMENT — PAIN SCALES - GENERAL: PAINLEVEL: NO PAIN (0)

## 2020-12-16 NOTE — NURSING NOTE
"Chief Complaint   Patient presents with     Arthritis       Initial /78 (BP Location: Left arm, Patient Position: Chair, Cuff Size: Adult Regular)   Pulse 75   Temp 96.4  F (35.8  C) (Tympanic)   Wt 68.5 kg (151 lb)   SpO2 97%   BMI 22.96 kg/m   Estimated body mass index is 22.96 kg/m  as calculated from the following:    Height as of 3/10/20: 1.727 m (5' 8\").    Weight as of this encounter: 68.5 kg (151 lb).  Medication Reconciliation: complete  GAIL GONZALES LPN  "

## 2021-04-01 ENCOUNTER — TELEPHONE (OUTPATIENT)
Dept: INTERNAL MEDICINE | Facility: OTHER | Age: 86
End: 2021-04-01

## 2021-04-01 DIAGNOSIS — Z51.81 ENCOUNTER FOR THERAPEUTIC DRUG MONITORING: Primary | ICD-10-CM

## 2021-04-01 DIAGNOSIS — M35.3 PMR (POLYMYALGIA RHEUMATICA) (H): Primary | ICD-10-CM

## 2021-04-01 DIAGNOSIS — M35.3 PMR (POLYMYALGIA RHEUMATICA) (H): ICD-10-CM

## 2021-04-01 LAB
ERYTHROCYTE [DISTWIDTH] IN BLOOD BY AUTOMATED COUNT: 15.9 % (ref 10–15)
HCT VFR BLD AUTO: 39.9 % (ref 40–53)
HGB BLD-MCNC: 13.1 G/DL (ref 13.3–17.7)
MCH RBC QN AUTO: 30.8 PG (ref 26.5–33)
MCHC RBC AUTO-ENTMCNC: 32.8 G/DL (ref 31.5–36.5)
MCV RBC AUTO: 94 FL (ref 78–100)
PLATELET # BLD AUTO: 255 10E9/L (ref 150–450)
RBC # BLD AUTO: 4.26 10E12/L (ref 4.4–5.9)
WBC # BLD AUTO: 7.3 10E9/L (ref 4–11)

## 2021-04-01 PROCEDURE — 86140 C-REACTIVE PROTEIN: CPT | Mod: ZL | Performed by: INTERNAL MEDICINE

## 2021-04-01 PROCEDURE — 82565 ASSAY OF CREATININE: CPT | Mod: ZL | Performed by: INTERNAL MEDICINE

## 2021-04-01 PROCEDURE — 85027 COMPLETE CBC AUTOMATED: CPT | Mod: ZL | Performed by: INTERNAL MEDICINE

## 2021-04-01 PROCEDURE — 84450 TRANSFERASE (AST) (SGOT): CPT | Mod: ZL | Performed by: INTERNAL MEDICINE

## 2021-04-01 PROCEDURE — 84460 ALANINE AMINO (ALT) (SGPT): CPT | Mod: ZL | Performed by: INTERNAL MEDICINE

## 2021-04-01 PROCEDURE — 36415 COLL VENOUS BLD VENIPUNCTURE: CPT | Mod: ZL | Performed by: INTERNAL MEDICINE

## 2021-04-02 LAB
ALT SERPL W P-5'-P-CCNC: 28 U/L (ref 0–70)
AST SERPL W P-5'-P-CCNC: 23 U/L (ref 0–45)
CREAT SERPL-MCNC: 1.02 MG/DL (ref 0.66–1.25)
CRP SERPL-MCNC: 48.1 MG/L (ref 0–8)
GFR SERPL CREATININE-BSD FRML MDRD: 64 ML/MIN/{1.73_M2}

## 2021-04-06 ENCOUNTER — MYC MEDICAL ADVICE (OUTPATIENT)
Dept: RHEUMATOLOGY | Facility: CLINIC | Age: 86
End: 2021-04-06

## 2021-04-06 ENCOUNTER — MYC MEDICAL ADVICE (OUTPATIENT)
Dept: INTERNAL MEDICINE | Facility: OTHER | Age: 86
End: 2021-04-06

## 2021-04-06 DIAGNOSIS — M35.3 PMR (POLYMYALGIA RHEUMATICA) (H): Primary | ICD-10-CM

## 2021-04-06 NOTE — TELEPHONE ENCOUNTER
If he does not have fevers, coughs or burning with urination I doubt this is related to an underlying infection and probably related to the PMR.    Thanks

## 2021-04-08 NOTE — TELEPHONE ENCOUNTER
I read the [employer] message from April 6.  The elevated CRP recorded in the last several weeks has significance for polymyalgia rheumatica only if Victor M is having symptoms.  Please determine if he is experiencing symptoms that might be expected with PMR (neck or hip or shoulder girdle stiffness or pain), or that he has previously associated with PMR.    If there are no symptoms that are suggestive of polymyalgia rheumatica, I recommend simply repeating CRP and sedimentation rate sometime in the second half of April.

## 2021-04-09 NOTE — TELEPHONE ENCOUNTER
Spoke with Victor M and he is reporting that he is not having any pain or stiffness in any part of his body at all at this time.     Labs for the CRP and ESR will be repeated in the next week.     Victor M says he will call the lab to schedule the appointment.     Ashwini Blanton MSN, RN  Rheumatology RN Care Coordinator  [facility]

## 2021-04-12 ENCOUNTER — TELEPHONE (OUTPATIENT)
Dept: INTERNAL MEDICINE | Facility: OTHER | Age: 86
End: 2021-04-12

## 2021-04-12 NOTE — TELEPHONE ENCOUNTER
Pt's daughter called, reports that pt is experiencing shoulder pain. Daughter is unsure how long this has been going on and is unsure which shoulder it is. Scheduled for appt today with PCP per daughter's request.

## 2021-04-15 NOTE — PROGRESS NOTES
Assessment & Plan   Problem List Items Addressed This Visit     None      Visit Diagnoses     Traumatic compression fracture of T11 thoracic vertebra, closed, initial encounter (H)    -  Primary    Relevant Medications    acetaminophen (TYLENOL) 325 MG tablet    Other Relevant Orders    DX Hip/Pelvis/Spine           Review of prior external note(s) from [facility]  25 minutes spent on the date of the encounter doing chart review, review of outside records, review of test results, interpretation of tests, patient visit, documentation and discussion with family   Medication list was reviewed and more detailed explanation of his fx was given.           No follow-ups on file.    Christian Patel, [facility] - [place] Dow is a 89 year old who presents for the following health issues with daughter    ABNER Dow presents today with his daughter after recent hospitalization after a fall where he suffered a T11 fracture.  He is now in a brace and is wearing an event monitor as there is some question as to syncope.  Victor M does not recall the events of the fall.  He is to wear the brace for 3 months.  He has not had a DEXA scan in 5 years but remains on Fosamax. [facility] records were reviewed.  His one daughter was present and other was on the phone.        Hospital Follow-up Visit:    Hospital/Nursing Home/IP Rehab Facility: [facility] 9W NEURO TRAUMA  Date of Admission: 4/12/2021  Date of Discharge: 4/14/2021  Reason(s) for Admission: Closed stable burst fracture of the eleventh thoracic vertebra      Was your hospitalization related to COVID-19? No   Problems taking medications regularly:  None  Medication changes since discharge: None  Problems adhering to non-medication therapy:  None    Summary of hospitalization:  CareEverywhere information obtained and reviewed  Diagnostic Tests/Treatments reviewed.  Follow up needed: Cardiology and  Neurology  Other Healthcare Providers Involved in Patient s Care:         None  Update since discharge: improved.       Post Discharge Medication Reconciliation: discharge medications reconciled, continue medications without change.  Plan of care communicated with patient and family                 Musculoskeletal problem/pain  Onset/Duration: 7 days  Description  Location: Back  Joint Swelling: no  Redness: no  Pain: YES- sore from sitting all day but no 'pain'   Warmth: no  Intensity:  mild  Progression of Symptoms:  improving  Accompanying signs and symptoms:   Fevers: no  Numbness/tingling/weakness: YES- fingers but not new  History  Trauma to the area: YES- fracture to back   Recent illness:  no  Previous similar problem: no  Previous evaluation:  YES  Precipitating or alleviating factors:  Aggravating factors include: none  Therapies tried and outcome: acetaminophen        Review of Systems   Constitutional, HEENT, cardiovascular, pulmonary, gi and gu systems are negative, except as otherwise noted.      Objective    BP (!) 158/84 (BP Location: Right arm, Patient Position: Sitting, Cuff Size: Adult Regular)   Pulse 77   Temp 96.4  F (35.8  C) (Tympanic)   Resp 18   Wt 67.1 kg (148 lb)   SpO2 97%   BMI 22.50 kg/m    Body mass index is 22.5 kg/m .  Physical Exam   GENERAL: Alert and no distress  RESP: lungs clear to auscultation - no rales, rhonchi or wheezes  CV: regular rate and rhythm, normal S1 S2, no S3 or S4, no murmur, click or rub, no peripheral edema and peripheral pulses strong  MS: In turtle brace  SKIN: no suspicious lesions or rashes  PSYCH:  Moderately demented    Orders Only on 04/01/2021   Component Date Value Ref Range Status     WBC 04/01/2021 7.3  4.0 - 11.0 10e9/L Final     RBC Count 04/01/2021 4.26* 4.4 - 5.9 10e12/L Final     Hemoglobin 04/01/2021 13.1* 13.3 - 17.7 g/dL Final     Hematocrit 04/01/2021 39.9* 40.0 - 53.0 % Final     MCV 04/01/2021 94  78 - 100 fl Final     MCH  04/01/2021 30.8  26.5 - 33.0 pg Final     MCHC 04/01/2021 32.8  31.5 - 36.5 g/dL Final     RDW 04/01/2021 15.9* 10.0 - 15.0 % Final     Platelet Count 04/01/2021 255  150 - 450 10e9/L Final     CRP Inflammation 04/01/2021 48.1* 0.0 - 8.0 mg/L Final     Creatinine 04/01/2021 1.02  0.66 - 1.25 mg/dL Final     GFR Estimate 04/01/2021 64  >60 mL/min/[1.73_m2] Final    Comment: Non  GFR Calc  Starting 12/18/2018, serum creatinine based estimated GFR (eGFR) will be   calculated using the Chronic Kidney Disease Epidemiology Collaboration   (CKD-EPI) equation.       GFR Estimate If Black 04/01/2021 74  >60 mL/min/[1.73_m2] Final    Comment:  GFR Calc  Starting 12/18/2018, serum creatinine based estimated GFR (eGFR) will be   calculated using the Chronic Kidney Disease Epidemiology Collaboration   (CKD-EPI) equation.       AST 04/01/2021 23  0 - 45 U/L Final     ALT 04/01/2021 28  0 - 70 U/L Final     No results found for any visits on 04/19/21.  No results found for this or any previous visit (from the past 24 hour(s)).

## 2021-04-18 ENCOUNTER — HEALTH MAINTENANCE LETTER (OUTPATIENT)
Age: 86
End: 2021-04-18

## 2021-04-19 ENCOUNTER — OFFICE VISIT (OUTPATIENT)
Dept: INTERNAL MEDICINE | Facility: OTHER | Age: 86
End: 2021-04-19
Attending: INTERNAL MEDICINE
Payer: COMMERCIAL

## 2021-04-19 VITALS
WEIGHT: 148 LBS | OXYGEN SATURATION: 97 % | SYSTOLIC BLOOD PRESSURE: 158 MMHG | BODY MASS INDEX: 22.5 KG/M2 | RESPIRATION RATE: 18 BRPM | DIASTOLIC BLOOD PRESSURE: 84 MMHG | HEART RATE: 77 BPM | TEMPERATURE: 96.4 F

## 2021-04-19 DIAGNOSIS — S22.080A TRAUMATIC COMPRESSION FRACTURE OF T11 THORACIC VERTEBRA, CLOSED, INITIAL ENCOUNTER (H): Primary | ICD-10-CM

## 2021-04-19 PROCEDURE — G0463 HOSPITAL OUTPT CLINIC VISIT: HCPCS

## 2021-04-19 PROCEDURE — 99213 OFFICE O/P EST LOW 20 MIN: CPT | Performed by: INTERNAL MEDICINE

## 2021-04-19 RX ORDER — ACETAMINOPHEN 325 MG/1
650 TABLET ORAL
COMMUNITY

## 2021-04-19 RX ORDER — LEVOTHYROXINE SODIUM 25 UG/1
TABLET ORAL
COMMUNITY
Start: 2021-03-16 | End: 2021-06-18

## 2021-04-19 RX ORDER — METOPROLOL SUCCINATE 25 MG/1
25 TABLET, EXTENDED RELEASE ORAL DAILY
COMMUNITY
Start: 2021-02-23 | End: 2021-06-22

## 2021-04-19 RX ORDER — LATANOPROST 50 UG/ML
SOLUTION/ DROPS OPHTHALMIC
COMMUNITY
Start: 2021-03-20

## 2021-04-19 RX ORDER — CALCIUM CARBONATE 500 MG/1
1 TABLET, CHEWABLE ORAL DAILY
COMMUNITY
Start: 2021-04-19 | End: 2021-05-20

## 2021-04-19 RX ORDER — IBUPROFEN 200 MG
500 CAPSULE ORAL
COMMUNITY
End: 2021-05-20

## 2021-04-19 NOTE — NURSING NOTE
"Chief Complaint   Patient presents with     Musculoskeletal Problem       Initial BP (!) 158/84 (BP Location: Right arm, Patient Position: Sitting, Cuff Size: Adult Regular)   Pulse 77   Temp 96.4  F (35.8  C) (Tympanic)   Resp 18   Wt 67.1 kg (148 lb)   SpO2 97%   BMI 22.50 kg/m   Estimated body mass index is 22.5 kg/m  as calculated from the following:    Height as of 3/10/20: 1.727 m (5' 8\").    Weight as of this encounter: 67.1 kg (148 lb).  Medication Reconciliation: complete  Isma Moses LPN  "

## 2021-04-20 ENCOUNTER — HOSPITAL ENCOUNTER (OUTPATIENT)
Dept: BONE DENSITY | Facility: HOSPITAL | Age: 86
Discharge: HOME | End: 2021-04-20
Attending: INTERNAL MEDICINE | Admitting: INTERNAL MEDICINE
Payer: MEDICARE

## 2021-04-20 DIAGNOSIS — S22.080A TRAUMATIC COMPRESSION FRACTURE OF T11 THORACIC VERTEBRA, CLOSED, INITIAL ENCOUNTER (H): ICD-10-CM

## 2021-04-20 PROCEDURE — 77080 DXA BONE DENSITY AXIAL: CPT

## 2021-04-22 ENCOUNTER — HOSPITAL ENCOUNTER (OUTPATIENT)
Dept: PHYSICAL THERAPY | Facility: OTHER | Age: 86
Discharge: HOME | End: 2021-04-22
Attending: INTERNAL MEDICINE | Admitting: INTERNAL MEDICINE
Payer: MEDICARE

## 2021-04-22 PROCEDURE — 97110 THERAPEUTIC EXERCISES: CPT | Mod: GP | Performed by: PHYSICAL THERAPIST

## 2021-04-22 PROCEDURE — 97161 PT EVAL LOW COMPLEX 20 MIN: CPT | Mod: GP | Performed by: PHYSICAL THERAPIST

## 2021-04-30 DIAGNOSIS — M35.3 PMR (POLYMYALGIA RHEUMATICA) (H): ICD-10-CM

## 2021-04-30 DIAGNOSIS — D64.9 ANEMIA, UNSPECIFIED TYPE: ICD-10-CM

## 2021-04-30 DIAGNOSIS — Z51.81 ENCOUNTER FOR THERAPEUTIC DRUG MONITORING: ICD-10-CM

## 2021-04-30 DIAGNOSIS — Z87.39 HX OF CALCIUM PYROPHOSPHATE DEPOSITION DISEASE (CPPD): ICD-10-CM

## 2021-04-30 DIAGNOSIS — M72.2 PLANTAR FASCIITIS: ICD-10-CM

## 2021-04-30 DIAGNOSIS — Z87.39 HISTORY OF CALCIUM PYROPHOSPHATE DEPOSITION DISEASE (CPPD): ICD-10-CM

## 2021-05-03 RX ORDER — METHOTREXATE 2.5 MG/1
6 TABLET ORAL
Qty: 24 TABLET | Refills: 0 | Status: SHIPPED | OUTPATIENT
Start: 2021-05-03 | End: 2021-07-30

## 2021-05-03 NOTE — TELEPHONE ENCOUNTER
methotrexate 2.5 MG tablet      Last Written Prescription Date:  10/17/2020  Last Fill Quantity: 84,   # refills: 2  Last Office Visit: 10/16/2020  Future Office visit:  6/4/2021    CBC RESULTS:   Recent Labs   Lab Test 04/01/21  1000   WBC 7.3   RBC 4.26*   HGB 13.1*   HCT 39.9*   MCV 94   MCH 30.8   MCHC 32.8   RDW 15.9*          Creatinine   Date Value Ref Range Status   04/01/2021 1.02 0.66 - 1.25 mg/dL Final   ]    Liver Function Studies -   Recent Labs   Lab Test 04/01/21  1000 12/16/20  1427   PROTTOTAL  --  7.4   ALBUMIN  --  3.5   BILITOTAL  --  0.5   ALKPHOS  --  110   AST 23 28   ALT 28 24       Routing refill request to provider for review/approval because:  DMARD medication.  - labs current  - future visit 6/4/2020

## 2021-05-04 ENCOUNTER — HOSPITAL ENCOUNTER (OUTPATIENT)
Dept: PHYSICAL THERAPY | Facility: OTHER | Age: 86
Discharge: HOME | End: 2021-05-04
Attending: INTERNAL MEDICINE | Admitting: INTERNAL MEDICINE
Payer: MEDICARE

## 2021-05-04 PROCEDURE — 97110 THERAPEUTIC EXERCISES: CPT | Mod: GP

## 2021-05-11 ENCOUNTER — HOSPITAL ENCOUNTER (OUTPATIENT)
Dept: PHYSICAL THERAPY | Facility: OTHER | Age: 86
Discharge: HOME | End: 2021-05-11
Attending: INTERNAL MEDICINE | Admitting: INTERNAL MEDICINE
Payer: MEDICARE

## 2021-05-11 PROCEDURE — 97110 THERAPEUTIC EXERCISES: CPT | Mod: GP

## 2021-05-18 ENCOUNTER — HOSPITAL ENCOUNTER (OUTPATIENT)
Dept: PHYSICAL THERAPY | Facility: OTHER | Age: 86
Discharge: HOME | End: 2021-05-18
Attending: INTERNAL MEDICINE | Admitting: INTERNAL MEDICINE
Payer: MEDICARE

## 2021-05-18 PROCEDURE — 97110 THERAPEUTIC EXERCISES: CPT | Mod: GP

## 2021-05-20 RX ORDER — CHOLECALCIFEROL (VITAMIN D3) 50 MCG
1 TABLET ORAL DAILY
COMMUNITY

## 2021-05-28 ENCOUNTER — OFFICE VISIT (OUTPATIENT)
Dept: FAMILY MEDICINE | Facility: OTHER | Age: 86
End: 2021-05-28
Attending: NURSE PRACTITIONER
Payer: MEDICARE

## 2021-05-28 VITALS
HEIGHT: 67 IN | DIASTOLIC BLOOD PRESSURE: 76 MMHG | TEMPERATURE: 97 F | SYSTOLIC BLOOD PRESSURE: 150 MMHG | RESPIRATION RATE: 16 BRPM | OXYGEN SATURATION: 98 % | WEIGHT: 154 LBS | BODY MASS INDEX: 24.17 KG/M2 | HEART RATE: 66 BPM

## 2021-05-28 DIAGNOSIS — M54.50 CHRONIC RIGHT-SIDED LOW BACK PAIN WITHOUT SCIATICA: ICD-10-CM

## 2021-05-28 DIAGNOSIS — M35.3 PMR (POLYMYALGIA RHEUMATICA) (H): ICD-10-CM

## 2021-05-28 DIAGNOSIS — S22.082D: ICD-10-CM

## 2021-05-28 DIAGNOSIS — Z51.81 ENCOUNTER FOR THERAPEUTIC DRUG MONITORING: ICD-10-CM

## 2021-05-28 DIAGNOSIS — G89.29 CHRONIC RIGHT-SIDED LOW BACK PAIN WITHOUT SCIATICA: ICD-10-CM

## 2021-05-28 LAB
ALBUMIN SERPL-MCNC: 3.4 G/DL (ref 3.4–5)
ALP SERPL-CCNC: 126 U/L (ref 40–150)
ALT SERPL W P-5'-P-CCNC: 29 U/L (ref 0–70)
ANION GAP SERPL CALCULATED.3IONS-SCNC: 6 MMOL/L (ref 3–14)
AST SERPL W P-5'-P-CCNC: 27 U/L (ref 0–45)
BILIRUB SERPL-MCNC: 0.5 MG/DL (ref 0.2–1.3)
BUN SERPL-MCNC: 21 MG/DL (ref 7–30)
CALCIUM SERPL-MCNC: 9 MG/DL (ref 8.5–10.1)
CHLORIDE SERPL-SCNC: 103 MMOL/L (ref 94–109)
CO2 SERPL-SCNC: 29 MMOL/L (ref 20–32)
CREAT SERPL-MCNC: 1.08 MG/DL (ref 0.66–1.25)
CRP SERPL-MCNC: 16.7 MG/L (ref 0–8)
ERYTHROCYTE [DISTWIDTH] IN BLOOD BY AUTOMATED COUNT: 16.6 % (ref 10–15)
ERYTHROCYTE [SEDIMENTATION RATE] IN BLOOD BY WESTERGREN METHOD: 16 MM/H (ref 0–20)
GFR SERPL CREATININE-BSD FRML MDRD: 60 ML/MIN/{1.73_M2}
GLUCOSE SERPL-MCNC: 91 MG/DL (ref 70–99)
HCT VFR BLD AUTO: 39.1 % (ref 40–53)
HGB BLD-MCNC: 13.1 G/DL (ref 13.3–17.7)
MCH RBC QN AUTO: 30.8 PG (ref 26.5–33)
MCHC RBC AUTO-ENTMCNC: 33.5 G/DL (ref 31.5–36.5)
MCV RBC AUTO: 92 FL (ref 78–100)
PLATELET # BLD AUTO: 211 10E9/L (ref 150–450)
POTASSIUM SERPL-SCNC: 4.1 MMOL/L (ref 3.4–5.3)
PROT SERPL-MCNC: 7.1 G/DL (ref 6.8–8.8)
RBC # BLD AUTO: 4.26 10E12/L (ref 4.4–5.9)
SODIUM SERPL-SCNC: 138 MMOL/L (ref 133–144)
WBC # BLD AUTO: 7.6 10E9/L (ref 4–11)

## 2021-05-28 PROCEDURE — 85027 COMPLETE CBC AUTOMATED: CPT | Mod: ZL | Performed by: INTERNAL MEDICINE

## 2021-05-28 PROCEDURE — G0463 HOSPITAL OUTPT CLINIC VISIT: HCPCS

## 2021-05-28 PROCEDURE — 86140 C-REACTIVE PROTEIN: CPT | Mod: ZL | Performed by: INTERNAL MEDICINE

## 2021-05-28 PROCEDURE — 80053 COMPREHEN METABOLIC PANEL: CPT | Mod: ZL | Performed by: INTERNAL MEDICINE

## 2021-05-28 PROCEDURE — 85652 RBC SED RATE AUTOMATED: CPT | Mod: ZL | Performed by: INTERNAL MEDICINE

## 2021-05-28 PROCEDURE — 36416 COLLJ CAPILLARY BLOOD SPEC: CPT | Mod: ZL | Performed by: INTERNAL MEDICINE

## 2021-05-28 PROCEDURE — 99213 OFFICE O/P EST LOW 20 MIN: CPT | Performed by: NURSE PRACTITIONER

## 2021-05-28 ASSESSMENT — PAIN SCALES - GENERAL: PAINLEVEL: MODERATE PAIN (5)

## 2021-05-28 ASSESSMENT — MIFFLIN-ST. JEOR: SCORE: 1312.17

## 2021-05-28 NOTE — PATIENT INSTRUCTIONS
Assessment & Plan       Right-sided low back pain without sciatica  - Aleve OTC PRN  - Ice 4 times daily for 20 minutes  - Topical Voltaren Gel OTC - Can apply up to 4 times daily    Closed unstable burst fracture of eleventh thoracic vertebra with routine healing  - Continue plan of care      Sandra Crenshaw, CNP  [facility] - [place] [facility]

## 2021-05-28 NOTE — PROGRESS NOTES
"      Assessment & Plan       Right-sided low back pain without sciatica  - Aleve OTC PRN  - Ice 4 times daily for 20 minutes  - Topical Voltaren Gel OTC - Can apply up to 4 times daily    Closed unstable burst fracture of eleventh thoracic vertebra with routine healing  - Continue plan of care      Sandra Crenshaw CNP  [facility] - MARLA Dow is a 89 year old who presents for the following health issues       Back pain  He fractured his 11th thoracic vertebrae in April, and followed with Neurosurgery  He continues in a tortoise brace  Onset/Duration: 3 days   Description:   Character: Sharp and Dull ache  Location: right flank  Radiation: Back  Intensity: moderate  Progression of Symptoms:  same  Accompanying Signs & Symptoms:  Fever/Chills: no  Gas/Bloating: no  Nausea: no  Vomitting: no  Diarrhea: no  Constipation: no  Dysuria or Hematuria: no  History:   Trauma: no  Previous similar pain: no  Previous tests done: none  Precipitating factors:   Does the pain change with:     Food: no    Bowel Movement: no    Urination: no   Other factors:  no  Therapies tried and outcome: tylenol          Review of Systems   Constitutional, HEENT, cardiovascular, pulmonary, gi and gu systems are negative, except as otherwise noted.        Objective    BP (!) 150/76 (BP Location: Right arm, Patient Position: Chair, Cuff Size: Adult Regular)   Pulse 66   Temp 97  F (36.1  C) (Tympanic)   Resp 16   Ht 1.702 m (5' 7\")   Wt 69.9 kg (154 lb)   SpO2 98%   BMI 24.12 kg/m    Body mass index is 24.12 kg/m .       Physical Exam   GENERAL: healthy, alert and no distress  NECK: no adenopathy, no asymmetry, masses, or scars and thyroid normal to palpation  RESP: lungs clear to auscultation - no rales, rhonchi or wheezes  CV: regular rate and rhythm, normal S1 S2, no S3 or S4, no murmur, click or rub, no peripheral edema and peripheral pulses strong  MS: Right low back tenderness - seems muscular in origin given his " presentation.  Brace contacts this area.  SKIN: no suspicious lesions or rashes  PSYCH: mentation appears normal, affect normal/bright

## 2021-05-28 NOTE — NURSING NOTE
"Chief Complaint   Patient presents with     Back Pain       Initial BP (!) 150/76 (BP Location: Right arm, Patient Position: Chair, Cuff Size: Adult Regular)   Pulse 66   Temp 97  F (36.1  C) (Tympanic)   Resp 16   Ht 1.702 m (5' 7\")   Wt 69.9 kg (154 lb)   SpO2 98%   BMI 24.12 kg/m   Estimated body mass index is 24.12 kg/m  as calculated from the following:    Height as of this encounter: 1.702 m (5' 7\").    Weight as of this encounter: 69.9 kg (154 lb).  Medication Reconciliation: complete  Gayla Shukla LPN    "

## 2021-06-03 ENCOUNTER — OFFICE VISIT (OUTPATIENT)
Dept: INTERNAL MEDICINE | Facility: OTHER | Age: 86
End: 2021-06-03
Attending: INTERNAL MEDICINE
Payer: COMMERCIAL

## 2021-06-03 VITALS
OXYGEN SATURATION: 98 % | BODY MASS INDEX: 24.28 KG/M2 | SYSTOLIC BLOOD PRESSURE: 124 MMHG | DIASTOLIC BLOOD PRESSURE: 78 MMHG | WEIGHT: 155 LBS | HEART RATE: 68 BPM

## 2021-06-03 DIAGNOSIS — E03.9 ACQUIRED HYPOTHYROIDISM: Primary | ICD-10-CM

## 2021-06-03 LAB
T4 FREE SERPL-MCNC: 1.02 NG/DL (ref 0.76–1.46)
TSH SERPL DL<=0.005 MIU/L-ACNC: 5.51 MU/L (ref 0.4–4)

## 2021-06-03 PROCEDURE — G0463 HOSPITAL OUTPT CLINIC VISIT: HCPCS

## 2021-06-03 PROCEDURE — 84443 ASSAY THYROID STIM HORMONE: CPT | Mod: ZL | Performed by: INTERNAL MEDICINE

## 2021-06-03 PROCEDURE — 36415 COLL VENOUS BLD VENIPUNCTURE: CPT | Mod: ZL | Performed by: INTERNAL MEDICINE

## 2021-06-03 PROCEDURE — 99214 OFFICE O/P EST MOD 30 MIN: CPT | Performed by: INTERNAL MEDICINE

## 2021-06-03 PROCEDURE — 84439 ASSAY OF FREE THYROXINE: CPT | Mod: ZL | Performed by: INTERNAL MEDICINE

## 2021-06-03 ASSESSMENT — PAIN SCALES - GENERAL: PAINLEVEL: NO PAIN (0)

## 2021-06-03 NOTE — PROGRESS NOTES
"    Assessment & Plan   Problem List Items Addressed This Visit     None      Visit Diagnoses     Acquired hypothyroidism    -  Primary    Relevant Orders    TSH (Completed)    T4, free (Completed)             20 minutes spent on the date of the encounter doing chart review, review of test results, interpretation of tests, patient visit and documentation        Christian Patel, DO  [facility] - [place] Dow is a 89 year old who presents for the following health issues     HPI   Victor M presents today, he is uncertain as to why he is here.  He states \"my daughter set this up for you to check me out\".  He otherwise voices no complaints today.  He is still wearing turtle brace due to T11 compression fracture.  He has had a slightly elevated TSH in the recent past.  So we will check that today.      Hypertension Follow-up      Do you check your blood pressure regularly outside of the clinic? Yes     Are you following a low salt diet? No    Are your blood pressures ever more than 140 on the top number (systolic) OR more   than 90 on the bottom number (diastolic), for example 140/90? Yes      Hyperlipidemia Follow-Up      Are you regularly taking any medication or supplement to lower your cholesterol?   Yes- lipitor    Are you having muscle aches or other side effects that you think could be caused by your cholesterol lowering medication?  No    Hypothyroidism Follow-up      Since last visit, patient describes the following symptoms: Weight stable, no hair loss, no skin changes, no constipation, no loose stools        Review of Systems   Constitutional, HEENT, cardiovascular, pulmonary, gi and gu systems are negative, except as otherwise noted.      Objective    Wt 70.3 kg (155 lb)   BMI 24.28 kg/m    Body mass index is 24.28 kg/m .  Physical Exam   GENERAL: Alert and no distress  RESP: lungs clear to auscultation - no rales, rhonchi or wheezes  CV: regular rate and rhythm, normal S1 S2, " no S3 or S4, no murmur, exam was difficult due to brace  MS: Turtle brace on  PSYCH: Pleasant but confused    Orders Only on 05/28/2021   Component Date Value Ref Range Status     Sodium 05/28/2021 138  133 - 144 mmol/L Final     Potassium 05/28/2021 4.1  3.4 - 5.3 mmol/L Final     Chloride 05/28/2021 103  94 - 109 mmol/L Final     Carbon Dioxide 05/28/2021 29  20 - 32 mmol/L Final     Anion Gap 05/28/2021 6  3 - 14 mmol/L Final     Glucose 05/28/2021 91  70 - 99 mg/dL Final     Urea Nitrogen 05/28/2021 21  7 - 30 mg/dL Final     Creatinine 05/28/2021 1.08  0.66 - 1.25 mg/dL Final     GFR Estimate 05/28/2021 60* >60 mL/min/[1.73_m2] Final    Comment: Non  GFR Calc  Starting 12/18/2018, serum creatinine based estimated GFR (eGFR) will be   calculated using the Chronic Kidney Disease Epidemiology Collaboration   (CKD-EPI) equation.       GFR Estimate If Black 05/28/2021 69  >60 mL/min/[1.73_m2] Final    Comment:  GFR Calc  Starting 12/18/2018, serum creatinine based estimated GFR (eGFR) will be   calculated using the Chronic Kidney Disease Epidemiology Collaboration   (CKD-EPI) equation.       Calcium 05/28/2021 9.0  8.5 - 10.1 mg/dL Final     Bilirubin Total 05/28/2021 0.5  0.2 - 1.3 mg/dL Final     Albumin 05/28/2021 3.4  3.4 - 5.0 g/dL Final     Protein Total 05/28/2021 7.1  6.8 - 8.8 g/dL Final     Alkaline Phosphatase 05/28/2021 126  40 - 150 U/L Final     ALT 05/28/2021 29  0 - 70 U/L Final     AST 05/28/2021 27  0 - 45 U/L Final     CRP Inflammation 05/28/2021 16.7* 0.0 - 8.0 mg/L Final     WBC 05/28/2021 7.6  4.0 - 11.0 10e9/L Final     RBC Count 05/28/2021 4.26* 4.4 - 5.9 10e12/L Final     Hemoglobin 05/28/2021 13.1* 13.3 - 17.7 g/dL Final     Hematocrit 05/28/2021 39.1* 40.0 - 53.0 % Final     MCV 05/28/2021 92  78 - 100 fl Final     MCH 05/28/2021 30.8  26.5 - 33.0 pg Final     MCHC 05/28/2021 33.5  31.5 - 36.5 g/dL Final     RDW 05/28/2021 16.6* 10.0 - 15.0 % Final      Platelet Count 05/28/2021 211  150 - 450 10e9/L Final     Sed Rate 05/28/2021 16  0 - 20 mm/h Final     No results found for any visits on 06/03/21.  No results found for this or any previous visit (from the past 24 hour(s)).

## 2021-06-03 NOTE — NURSING NOTE
"Chief Complaint   Patient presents with     Hypertension     Lipids     Thyroid Problem       Initial /78 (BP Location: Right arm, Patient Position: Chair, Cuff Size: Adult Regular)   Pulse 68   Wt 70.3 kg (155 lb)   SpO2 98%   BMI 24.28 kg/m   Estimated body mass index is 24.28 kg/m  as calculated from the following:    Height as of 5/28/21: 1.702 m (5' 7\").    Weight as of this encounter: 70.3 kg (155 lb).  Medication Reconciliation: complete  GAIL GONZALES LPN    "

## 2021-06-18 DIAGNOSIS — E03.9 HYPOTHYROIDISM, UNSPECIFIED TYPE: Primary | ICD-10-CM

## 2021-06-18 RX ORDER — LEVOTHYROXINE SODIUM 25 UG/1
TABLET ORAL
Qty: 90 TABLET | Refills: 0 | Status: SHIPPED | OUTPATIENT
Start: 2021-06-18

## 2021-06-18 NOTE — TELEPHONE ENCOUNTER
levothyroxine (SYNTHROID/LEVOTHROID) 25 MCG tablet      Last Written Prescription Date:  historical  Last Fill Quantity: -,   # refills: -  Last Office Visit: 5/28/21  Future Office visit:       Routing refill request to provider for review/approval because:  Medication is reported/historical    TSH   Date Value Ref Range Status   06/03/2021 5.51 (H) 0.40 - 4.00 mU/L Final

## 2021-06-22 DIAGNOSIS — I10 ESSENTIAL HYPERTENSION: Primary | ICD-10-CM

## 2021-06-22 RX ORDER — METOPROLOL SUCCINATE 25 MG/1
25 TABLET, EXTENDED RELEASE ORAL DAILY
Qty: 90 TABLET | Refills: 1 | Status: SHIPPED | OUTPATIENT
Start: 2021-06-22 | End: 2021-10-06

## 2021-06-22 NOTE — TELEPHONE ENCOUNTER
METOPROLOL REQUESTING 90 DAY SUPPLY      Last Written Prescription Date:  UNKNOWN  Last Fill Quantity: ?,   # refills: ?  Last Office Visit: 6-3-2021  Future Office visit:       Routing refill request to provider for review/approval because:  Medication is reported/historical

## 2021-07-29 NOTE — PROGRESS NOTES
[facility]  Rheumatology Clinic--remote  Tee Goyal MD  2021     Name: Christian Akers  MRN: [id number]  Age: 89 year old    : 1930  Referring provider: Luana Martinez    Assessment and Plan:  #Polymyalgia Rheumatica:  Patient relates no shoulder or hip girdle symptoms, and denies headaches or visual changes.  He notes short-term right heel pain.  Video examination shows no restriction in shoulder or hand range of motion.  Ankle range of motion is full.  Lab work from May 28, 2021 showed normal comprehensive metabolic panel; CRP was 16.7 down from 48.1 noted in 2021.  TSH was 5.51.  CBC was normal except for stable mildly decreased hemoglobin of 13.1.  Sedimentation rate was 16, normal.  .    Polymyalgia rheumatica, by history, remains in remission. Prednisone could be tapered further, but rising CRP in the past has occurred with tapering below current dose of 2 mg daily.  I think that safety issues associated with an ultralow dose of prednisone are minimal.  I recommend continuing 2 mg daily.  I recommend continuing methotrexate 15 mg once weekly. While on the drug, he shoulder undergo every 3-4 month LFTs, CBC, creatinine, and CRP.     #Long-term glucocorticoid use and insufficiency fracture risk: Patient remains vigorously physically active with weightbearing exercise every day, and his current dose of prednisone is less than that expected to be produced by the adrenal glands on a daily basis.  He has sustained a T11 compression fracture, but he has healed with aid of a brace and is no longer symptomatic. DEXA scan performed in 2021 showed osteopenia with a T score of -1.7 at the left hip.  I recommend that he continue vitamin D 800 international units and calcium carbonate 1200 mill equivalents daily supplementation, and that he continue daily weight bearing exercise.    # Health Maintenance:  S/p COVID19 vaccination Spring 2021.    Follow-up: Return in about 6 months    Tee NATION  "MD Jay Jay  Staff Rheumatologist, [facility]:   Christian Akers has a history including polymyalgia rheumatica, pseudogout, and osteoarthritis who presents with his daughter for follow-up. I last evaluated the patient in 10-20, at which time polymyalgia rheumatica remained completely suppressed symptomatically.  I recommended that he continue methotrexate 15 mg weekly, at that visit.    Interval history 07-:    Health is good.     He notes some pain in his R foot in the heel. Pain has been present for 3 days. He is suspicious that yoga exercise might have precipitated.    He recently stopped using a brace for his back after a T11 compression fracture. No back pain today.     No eye symptoms. No headaches; he had a \"fuzzy sensation\" in his forehead several days ago; no recurrence.     Appetite is good. Energy level is good. No hip or shoulder pain.    He continues methotrexate 15 mg weekly. No problems with medication.     Interval history 10-:  Patient's daughter accompanies patient on telephone visit today.  His health has been good. He is doing yoga twice a day. He has occasional headache, not severe. Sensations occur about once a day, lasting 5 minutes, improved after drinking water. No visual change, no jaw or tongue pain.    He had a tooth pulled 2 months ago; no problems.    He had a pacemaker placed in late 2019 after syncopal episode.   He continues methotrexate 15 mg weekly. He continues prednisone 2 mg daily.  When he last tried to taper prednisone, the blood test \"flared\".    Prior history      Today, the patient reports that he has been well recently without significant daily pain. He states that his headaches have resolved. He denies any issues with tolerating medication, morning stiffness, or recent medication changes as he has continued 4 mg prednisone daily and 15 mg methotrexate weekly. He notes that he has been able to complete 10 push ups every morning.     He has " been taking Tylenol extra strength once daily as he was instructed to take this for pain, but he is unsure of why he takes it.    Of note, patient's daughter reports that a  CT scan of the chest was completed in December of 2016 which showed a few small nodules noted in the lungs and could be followed for in 12 months to discern stability. He notes that he has also been completing physical therapy for his knees which has been helpful as he has been able to bowl.    Review of Systems:   Pertinent items are noted in HPI or as below, remainder of complete ROS is negative.      No recent problems with hearing or vision. No swallowing problems.   No breathing difficulty, shortness of breath, coughing, or wheezing.  No chest pain or palpitations.  No heart burn, indigestion, abdominal pain, nausea, vomiting, diarrhea.  No urination problems, no bloody, cloudy urine, no dysuria.  No numbing, tingling, weakness.  No headaches or confusion.  No rashes. No easy bleeding or bruising.     Active Medications:     Current Outpatient Medications   Medication     acetaminophen (TYLENOL) 325 MG tablet     Ascorbic Acid (VITAMIN C) 500 MG CAPS     ASPIRIN PO     atorvastatin (LIPITOR) 40 MG tablet     brimonidine (ALPHAGAN-P) 0.15 % ophthalmic solution     Calcium Carbonate (CALCIUM 600 PO)     CALCIUM-VITAMIN D PO     cholecalciferol (D3) 50 MCG (2000 UT) tablet     FOLIC ACID PO     latanoprost (XALATAN) 0.005 % ophthalmic solution     levothyroxine (SYNTHROID/LEVOTHROID) 25 MCG tablet     Magnesium Oxide 250 MG TABS     methotrexate sodium 2.5 MG TABS     metoprolol succinate ER (TOPROL-XL) 25 MG 24 hr tablet     Multiple Vitamins-Minerals (MULTIVITAMIN ADULTS 50+ PO)     Multiple Vitamins-Minerals (SENIOR MULTIVITAMIN PLUS PO)     nitroGLYcerin (NITROSTAT) 0.4 MG sublingual tablet     omeprazole (PRILOSEC) 20 MG DR capsule     predniSONE (DELTASONE) 1 MG tablet     tamsulosin (FLOMAX) 0.4 MG capsule     No current  facility-administered medications for this visit.       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 He has never used smokeless tobacco. He reports that he drinks alcohol occasionally, around 1 drink per week.  PCP: Brenda Onofre  Marital Status:      Physical Exam:   There were no vitals taken for this visit.  Wt Readings from Last 4 Encounters:   06/03/21 70.3 kg (155 lb)   05/28/21 69.9 kg (154 lb)   04/19/21 67.1 kg (148 lb)   12/16/20 68.5 kg (151 lb)       Constitutional: well-developed, appearing stated age; cooperative  Eyes: nl EOM, PERRLA, vision, conjunctiva, sclera  ENT: nl external ears, nose, hearing, lips, teeth, gums, throat  No mucous membrane lesions, normal saliva pool  Neck: no mass or thyroid enlargement  Resp: breathing unlabored  MS: Normal shoulder and ankle ROM  Skin: no nail pitting, alopecia, rash, nodules or lesions  Neuro: nl cranial nerves  Psych: nl judgement, orientation, memory, affect.    Laboratory:   RHEUM RESULTS Latest Ref Rng & Units 12/16/2020  4/1/2021 5/28/2021   SED RATE 0 - 20 mm/h 17 - 16   CRP, INFLAMMATION 0.0 - 8.0 mg/L 18.1(H) 48.1(H) 16.7(H)   AST 0 - 45 U/L 28 23 27   ALT 0 - 70 U/L 24 28 29   ALBUMIN 3.4 - 5.0 g/dL 3.5 - 3.4   WBC 4.0 - 11.0 10e9/L 7.1 7.3 7.6   RBC 4.4 - 5.9 10e12/L 4.15(L) 4.26(L) 4.26(L)   HGB 13.3 - 17.7 g/dL 12.9(L) 13.1(L) 13.1(L)   HCT 40.0 - 53.0 % 38.4(L) 39.9(L) 39.1(L)   MCV 78 - 100 fl 93 94 92   MCHC 31.5 - 36.5 g/dL 33.6 32.8 33.5   RDW 10.0 - 15.0 % 16.3(H) 15.9(H) 16.6(H)    - 450 10e9/L 244 255 211   CREATININE 0.66 - 1.25 mg/dL 1.01 1.02 1.08   GFR ESTIMATE, IF BLACK >60 mL/min/[1.73:m2] 75 74 69   GFR ESTIMATE >60 mL/min/[1.73:m2] 65 64 60(L)    - 1,620 mg/dL - - -   IGA 70 - 380 mg/dL - - -   IGM 60 - 265 mg/dL - - -     Albumin Fraction   Date Value Ref Range Status   02/17/2017 3.8 3.7 - 5.1 g/dL Final     Alpha 2 Fraction   Date Value Ref Range Status   02/17/2017 0.9 0.5 - 0.9 g/dL Final     Beta Fraction   Date Value Ref Range Status   02/17/2017 0.6 0.6 - 1.0 g/dL Final     Gamma Fraction   Date Value Ref Range Status   02/17/2017 0.6 (L) 0.7 - 1.6 g/dL Final     Monoclonal Peak   Date Value Ref Range Status   02/17/2017 0.1 (H) 0.0 g/dL Final     ELP Interpretation:   Date Value Ref Range Status   02/17/2017   Final    Two very small monoclonal proteins (each about 0.1 g/dL or less) seen in the   gamma fraction.  See immunofixation report on same specimen. Pathologic   significance requires clinical correlation.  JOYCE Monroy M.D., Ph.D.,   Pathologist ().       Monoclonal Peak Urine   Date Value Ref Range Status   02/17/2017 0.0 0% % Final     Immunofixation ELP   Date Value Ref Range Status   02/17/2017   Final    (Note)  Monoclonal IgG immunoglobulin of kappa light chain type. Very  small monoclonal IgG immunoglobulin of lambda light chain type.  Monoclonal antibody therapeutics (e.g. Daratumumab) may appear as  monoclonal proteins on serum electrophoresis and  immunofixation.  Results should be interpreted with caution if the patient is  receiving monoclonal antibody therapy. Pathological significance  requires clinical correlation.  JOYCE Monroy M.D., Ph.D.,  Pathologist ([phone])         IGG   Date Value Ref Range Status   02/17/2017 702 695 - 1,620 mg/dL Final     IGA   Date Value Ref Range Status   02/17/2017 119 70 - 380 mg/dL Final     IGM   Date Value Ref Range Status   02/17/2017 14 (L) 60 - 265 mg/dL Final       Patient states he is feeling good, continues to do his yoga daily.  His daughter will be present for todays phone visit.  Victor M is a 89 year old who is being evaluated via a billable video visit.      How would you like to obtain your AVS? MyChart  If the video visit is dropped, the invitation should be resent by: Send to e-mail at: [email or web address]  Will anyone else be joining your video visit? No      Video Start Time: 3:20 PM  Video-Visit Details    Type of service:  Video Visit    Video End Time:3:50 PM    Originating Location (pt. Location): Home    Distant Location (provider location):  [facility] RHEUMATOLOGY [facility]INIC [place]     Platform used for Video Visit: [employer]

## 2021-07-30 ENCOUNTER — VIRTUAL VISIT (OUTPATIENT)
Dept: RHEUMATOLOGY | Facility: CLINIC | Age: 86
End: 2021-07-30
Attending: INTERNAL MEDICINE
Payer: COMMERCIAL

## 2021-07-30 DIAGNOSIS — Z87.39 HISTORY OF CALCIUM PYROPHOSPHATE DEPOSITION DISEASE (CPPD): ICD-10-CM

## 2021-07-30 DIAGNOSIS — M72.2 PLANTAR FASCIITIS: ICD-10-CM

## 2021-07-30 DIAGNOSIS — Z51.81 ENCOUNTER FOR THERAPEUTIC DRUG MONITORING: ICD-10-CM

## 2021-07-30 DIAGNOSIS — M35.3 PMR (POLYMYALGIA RHEUMATICA) (H): ICD-10-CM

## 2021-07-30 DIAGNOSIS — Z87.39 HX OF CALCIUM PYROPHOSPHATE DEPOSITION DISEASE (CPPD): ICD-10-CM

## 2021-07-30 DIAGNOSIS — D64.9 ANEMIA, UNSPECIFIED TYPE: ICD-10-CM

## 2021-07-30 PROCEDURE — 99214 OFFICE O/P EST MOD 30 MIN: CPT | Mod: 95 | Performed by: INTERNAL MEDICINE

## 2021-07-30 RX ORDER — PREDNISONE 1 MG/1
2 TABLET ORAL DAILY
Qty: 60 TABLET | Refills: 5 | Status: SHIPPED | OUTPATIENT
Start: 2021-07-30 | End: 2022-02-21

## 2021-07-30 RX ORDER — METHOTREXATE 2.5 MG/1
6 TABLET ORAL
Qty: 24 TABLET | Refills: 6 | Status: SHIPPED | OUTPATIENT
Start: 2021-07-30 | End: 2022-02-07

## 2021-07-30 ASSESSMENT — PAIN SCALES - GENERAL: PAINLEVEL: MILD PAIN (3)

## 2021-07-30 NOTE — LETTER
2021       RE: Christian Akers  [address] [address]     Dear Colleague,    Thank you for referring your patient, Christian Akers, to the [facility] RHEUMATOLOGY [facility]IN[facility] [place] at [facility]. Please see a copy of my visit note below.    [facility]  Rheumatology Clinic--remote  Tee Goyal MD  2021     Name: Christian Akers  MRN: [id number]  Age: 89 year old    : 1930  Referring provider: Luana Martinez    Assessment and Plan:  #Polymyalgia Rheumatica:  Patient relates no shoulder or hip girdle symptoms, and denies headaches or visual changes.  He notes short-term right heel pain.  Video examination shows no restriction in shoulder or hand range of motion.  Ankle range of motion is full.  Lab work from May 28, 2021 showed normal comprehensive metabolic panel; CRP was 16.7 down from 48.1 noted in 2021.  TSH was 5.51.  CBC was normal except for stable mildly decreased hemoglobin of 13.1.  Sedimentation rate was 16, normal.  .    Polymyalgia rheumatica, by history, remains in remission. Prednisone could be tapered further, but rising CRP in the past has occurred with tapering below current dose of 2 mg daily.  I think that safety issues associated with an ultralow dose of prednisone are minimal.  I recommend continuing 2 mg daily.  I recommend continuing methotrexate 15 mg once weekly. While on the drug, he shoulder undergo every 3-4 month LFTs, CBC, creatinine, and CRP.     #Long-term glucocorticoid use and insufficiency fracture risk: Patient remains vigorously physically active with weightbearing exercise every day, and his current dose of prednisone is less than that expected to be produced by the adrenal glands on a daily basis.  He has sustained a T11 compression fracture, but he has healed with aid of a brace and is no longer symptomatic. DEXA scan performed in 2021 showed osteopenia with a T  "score of -1.7 at the left hip.  I recommend that he continue vitamin D 800 international units and calcium carbonate 1200 mill equivalents daily supplementation, and that he continue daily weight bearing exercise.    # Health Maintenance:  S/p COVID19 vaccination Spring 2021.    Follow-up: Return in about 6 months    Tee Goyal MD  Staff Rheumatologist, [facility]:   Christian Akers has a history including polymyalgia rheumatica, pseudogout, and osteoarthritis who presents with his daughter for follow-up. I last evaluated the patient in 10-20, at which time polymyalgia rheumatica remained completely suppressed symptomatically.  I recommended that he continue methotrexate 15 mg weekly, at that visit.    Interval history 07-:    Health is good.     He notes some pain in his R foot in the heel. Pain has been present for 3 days. He is suspicious that yoga exercise might have precipitated.    He recently stopped using a brace for his back after a T11 compression fracture. No back pain today.     No eye symptoms. No headaches; he had a \"fuzzy sensation\" in his forehead several days ago; no recurrence.     Appetite is good. Energy level is good. No hip or shoulder pain.    He continues methotrexate 15 mg weekly. No problems with medication.     Interval history 10-:  Patient's daughter accompanies patient on telephone visit today.  His health has been good. He is doing yoga twice a day. He has occasional headache, not severe. Sensations occur about once a day, lasting 5 minutes, improved after drinking water. No visual change, no jaw or tongue pain.    He had a tooth pulled 2 months ago; no problems.    He had a pacemaker placed in late 2019 after syncopal episode.   He continues methotrexate 15 mg weekly. He continues prednisone 2 mg daily.  When he last tried to taper prednisone, the blood test \"flared\".    Prior history      Today, the patient reports that he has been well recently " without significant daily pain. He states that his headaches have resolved. He denies any issues with tolerating medication, morning stiffness, or recent medication changes as he has continued 4 mg prednisone daily and 15 mg methotrexate weekly. He notes that he has been able to complete 10 push ups every morning.     He has been taking Tylenol extra strength once daily as he was instructed to take this for pain, but he is unsure of why he takes it.    Of note, patient's daughter reports that a  CT scan of the chest was completed in December of 2016 which showed a few small nodules noted in the lungs and could be followed for in 12 months to discern stability. He notes that he has also been completing physical therapy for his knees which has been helpful as he has been able to bowl.    Review of Systems:   Pertinent items are noted in HPI or as below, remainder of complete ROS is negative.      No recent problems with hearing or vision. No swallowing problems.   No breathing difficulty, shortness of breath, coughing, or wheezing.  No chest pain or palpitations.  No heart burn, indigestion, abdominal pain, nausea, vomiting, diarrhea.  No urination problems, no bloody, cloudy urine, no dysuria.  No numbing, tingling, weakness.  No headaches or confusion.  No rashes. No easy bleeding or bruising.     Active Medications:     Current Outpatient Medications   Medication     acetaminophen (TYLENOL) 325 MG tablet     Ascorbic Acid (VITAMIN C) 500 MG CAPS     ASPIRIN PO     atorvastatin (LIPITOR) 40 MG tablet     brimonidine (ALPHAGAN-P) 0.15 % ophthalmic solution     Calcium Carbonate (CALCIUM 600 PO)     CALCIUM-VITAMIN D PO     cholecalciferol (D3) 50 MCG (2000 UT) tablet     FOLIC ACID PO     latanoprost (XALATAN) 0.005 % ophthalmic solution     levothyroxine (SYNTHROID/LEVOTHROID) 25 MCG tablet     Magnesium Oxide 250 MG TABS     methotrexate sodium 2.5 MG TABS     metoprolol succinate ER (TOPROL-XL) 25 MG 24 hr tablet      Multiple Vitamins-Minerals (MULTIVITAMIN ADULTS 50+ PO)     Multiple Vitamins-Minerals (SENIOR MULTIVITAMIN PLUS PO)     nitroGLYcerin (NITROSTAT) 0.4 MG sublingual tablet     omeprazole (PRILOSEC) 20 MG DR capsule     predniSONE (DELTASONE) 1 MG tablet     tamsulosin (FLOMAX) 0.4 MG capsule     No current facility-administered medications for this visit.       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 He has never used smokeless tobacco. He reports that he drinks alcohol occasionally, around 1 drink per week.  PCP: Brenda Onofre  Marital Status:      Physical Exam:   There were no vitals taken for this visit.  Wt Readings from Last 4 Encounters:   06/03/21 70.3 kg (155 lb)   05/28/21 69.9 kg (154 lb)   04/19/21 67.1 kg (148 lb)   12/16/20 68.5 kg (151 lb)       Constitutional: well-developed, appearing stated age; cooperative  Eyes: nl EOM, PERRLA, vision, conjunctiva, sclera  ENT: nl external ears, nose, hearing, lips, teeth, gums, throat  No mucous membrane lesions, normal saliva  pool  Neck: no mass or thyroid enlargement  Resp: breathing unlabored  MS: Normal shoulder and ankle ROM  Skin: no nail pitting, alopecia, rash, nodules or lesions  Neuro: nl cranial nerves  Psych: nl judgement, orientation, memory, affect.    Laboratory:   RHEUM RESULTS Latest Ref Rng & Units 12/16/2020 4/1/2021 5/28/2021   SED RATE 0 - 20 mm/h 17 - 16   CRP, INFLAMMATION 0.0 - 8.0 mg/L 18.1(H) 48.1(H) 16.7(H)   AST 0 - 45 U/L 28 23 27   ALT 0 - 70 U/L 24 28 29   ALBUMIN 3.4 - 5.0 g/dL 3.5 - 3.4   WBC 4.0 - 11.0 10e9/L 7.1 7.3 7.6   RBC 4.4 - 5.9 10e12/L 4.15(L) 4.26(L) 4.26(L)   HGB 13.3 - 17.7 g/dL 12.9(L) 13.1(L) 13.1(L)   HCT 40.0 - 53.0 % 38.4(L) 39.9(L) 39.1(L)   MCV 78 - 100 fl 93 94 92   MCHC 31.5 - 36.5 g/dL 33.6 32.8 33.5   RDW 10.0 - 15.0 % 16.3(H) 15.9(H) 16.6(H)    - 450 10e9/L 244 255 211   CREATININE 0.66 - 1.25 mg/dL 1.01 1.02 1.08   GFR ESTIMATE, IF BLACK >60 mL/min/[1.73:m2] 75 74 69   GFR ESTIMATE >60 mL/min/[1.73:m2] 65 64 60(L)    - 1,620 mg/dL - - -   IGA 70 - 380 mg/dL - - -   IGM 60 - 265 mg/dL - - -     Albumin Fraction   Date Value Ref Range Status   02/17/2017 3.8 3.7 - 5.1 g/dL Final     Alpha 2 Fraction   Date Value Ref Range Status   02/17/2017 0.9 0.5 - 0.9 g/dL Final     Beta Fraction   Date Value Ref Range Status   02/17/2017 0.6 0.6 - 1.0 g/dL Final     Gamma Fraction   Date Value Ref Range Status   02/17/2017 0.6 (L) 0.7 - 1.6 g/dL Final     Monoclonal Peak   Date Value Ref Range Status   02/17/2017 0.1 (H) 0.0 g/dL Final     ELP Interpretation:   Date Value Ref Range Status   02/17/2017   Final    Two very small monoclonal proteins (each about 0.1 g/dL or less) seen in the   gamma fraction.  See immunofixation report on same specimen. Pathologic   significance requires clinical correlation.  JOYCE Monroy M.D., Ph.D.,   Pathologist ().       Monoclonal Peak Urine   Date Value Ref Range Status   02/17/2017 0.0 0% % Final     Immunofixation ELP   Date  Value Ref Range Status   02/17/2017   Final    (Note)  Monoclonal IgG immunoglobulin of kappa light chain type. Very  small monoclonal IgG immunoglobulin of lambda light chain type.  Monoclonal antibody therapeutics (e.g. Daratumumab) may appear as  monoclonal proteins on serum electrophoresis and immunofixation.  Results should be interpreted with caution if the patient is  receiving monoclonal antibody therapy. Pathological significance  requires clinical correlation.  JOYCE Monroy M.D., Ph.D.,  Pathologist ([phone])         IGG   Date Value Ref Range Status   02/17/2017 702 695 - 1,620 mg/dL Final     IGA   Date Value Ref Range Status   02/17/2017 119 70 - 380 mg/dL Final     IGM   Date Value Ref Range Status   02/17/2017 14 (L) 60 - 265 mg/dL Final       Again, thank you for allowing me to participate in the care of your patient.      Sincerely,    Tee Goyal MD

## 2021-07-30 NOTE — PATIENT INSTRUCTIONS
Diagnosis:  1.  Polymyalgia rheumatica: No headaches, and no joint or muscle pain or stiffness.  I  that polymyalgia rheumatica is in remission, and recommend continuing immunosuppressive therapy.  2. Compression fracture T11    Plan:  1.  Continue methotrexate 15 mg weekly.While on methotrexate:   -- Check labs every 3 months (AST/ALT, Albumin, CBC with platelets)   -- Limit alcohol intake to 2 drinks weekly; use folate 1 mg daily.  --Tylenol 500-1000 mg can be used as needed up to three times daily for nausea/headache associated with dosing.  2.  Continue prednisone 2 mg daily  3. 10 pushups daily is acceptable; if pushups cause pain, stop immediately. Continue weight bearing exercise for optimal bony health.

## 2021-08-11 NOTE — PROGRESS NOTES
"SUBJECTIVE:   Christian Akers is a 89 year old male who presents for Preventive Visit.    {Split Bill scripting  The purpose of this visit is to discuss your medical history and prevent health problems before you are sick. You may be responsible for a co-pay, coinsurance, or deductible if your visit today includes services such as checking on a sore throat, having an x-ray or lab test, or treating and evaluating a new or existing condition :252016}  Patient has been advised of split billing requirements and indicates understanding: {Yes and No:157614}  Are you in the first 12 months of your Medicare Part B coverage?  { :110181::\"No\"}    Physical Health:    In general, how would you rate your overall physical health? { :630774}    Outside of work, how many days during the week do you exercise? { :552336}    Outside of work, approximately how many minutes a day do you exercise?{ :835493}    If you drink alcohol do you typically have >3 drinks per day or >7 drinks per week? { :162312}    Do you usually eat at least 4 servings of fruit and vegetables a day, include whole grains & fiber and avoid regularly eating high fat or \"junk\" foods? { :910599::\"Yes\"}    Do you have any problems taking medications regularly?  { :874097::\"No\"}    Do you have any side effects from medications? { :995834}    Needs assistance for the following daily activities: { :975209}    Which of the following safety concerns are present in your home?  { :416451::\"none identified\"}     Hearing impairment: { :850329}    In the past 6 months, have you been bothered by leaking of urine? { :878193}    Mental Health:    In general, how would you rate your overall mental or emotional health? { :737631}  PHQ-2 Score:      Do you feel safe in your environment? { :633697}    Have you ever done Advance Care Planning? (For example, a Health Directive, POLST, or a discussion with a medical provider or your loved ones about your wishes): { " ":084411}    Additional concerns to address?  {If YES, notify patient they may be responsible for a copay, coinsurance or deductible if the visit today includes services such as checking on a sore throat, having an x-ray or lab test, or treating and evaluating a new or existing condition :778715::\"No\"}    Fall risk:  { :983613}  {If any of the above assessments are answered yes, consider ordering appropriate referrals (Optional):648585::\"click delete button to remove this line now\"}  Cognitive Screening: { :687333}    {Do you have sleep apnea, excessive snoring or daytime drowsiness? (Optional):353928}    {Outside tests to abstract? :860173}    {additional problems to add (Optional):814053}    Reviewed and updated as needed this visit by clinical staff                 Reviewed and updated as needed this visit by Provider                Social History     Tobacco Use     Smoking status: Former Smoker     Smokeless tobacco: Never Used   Substance Use Topics     Alcohol use: Not on file                           Current providers sharing in care for this patient include: {Rooming staff:  Please update Care Team in Rooming Activity, refresh this note and then delete this statement}  Patient Care Team:  Christian Patel DO as PCP - General (Internal Medicine)  Clinic, [facility] PCP  Christian Patel DO as Assigned PCP  Tee Goyal MD as Assigned Rheumatology Provider    The following health maintenance items are reviewed in Epic and correct as of today:  Health Maintenance   Topic Date Due     ZOSTER IMMUNIZATION (2 of 3) 02/26/2008     MEDICARE ANNUAL WELLNESS VISIT  03/10/2021     INFLUENZA VACCINE (1) 09/01/2021     FALL RISK ASSESSMENT  06/03/2022     DTAP/TDAP/TD IMMUNIZATION (3 - Td or Tdap) 02/24/2025     ADVANCE CARE PLANNING  03/10/2025     PHQ-2  Completed     Pneumococcal Vaccine: 65+ Years  Completed     COVID-19 Vaccine  Completed     IPV IMMUNIZATION  Aged Out     MENINGITIS " "IMMUNIZATION  Aged Out     HEPATITIS B IMMUNIZATION  Aged Out     {Chronicprobdata (Optional):808554}  {Decision Support (Optional):074863}    ROS:  {ROS COMP:234479}    OBJECTIVE:   There were no vitals taken for this visit. Estimated body mass index is 24.28 kg/m  as calculated from the following:    Height as of 21: 1.702 m (5' 7\").    Weight as of 6/3/21: 70.3 kg (155 lb).  EXAM:   {Exam :537989}    {Diagnostic Test Results (Optional):323166::\"Diagnostic Test Results:\",\"Labs reviewed in Epic\"}    ASSESSMENT / PLAN:   {Diag Picklist:304486}    Patient has been advised of split billing requirements and indicates understanding: {YES / NO:182737::\"Yes\"}    COUNSELING:  {Medicare Counselin}    Estimated body mass index is 24.28 kg/m  as calculated from the following:    Height as of 21: 1.702 m (5' 7\").    Weight as of 6/3/21: 70.3 kg (155 lb).    {Weight Management Plan (ACO) Complete if BMI is abnormal-  Ages 18-64  BMI >24.9.  Age 65+ with BMI <23 or >30 (Optional):836196}    He reports that he has quit smoking. He has never used smokeless tobacco.    Appropriate preventive services were discussed with this patient, including applicable screening as appropriate for cardiovascular disease, diabetes, osteopenia/osteoporosis, and glaucoma.  As appropriate for age/gender, discussed screening for colorectal cancer, prostate cancer, breast cancer, and cervical cancer. Checklist reviewing preventive services available has been given to the patient.    Reviewed patients plan of care and provided an AVS. The {CarePlan:238424} for Christian meets the Care Plan requirement. This Care Plan has been established and reviewed with the {PATIENT, FAMILY MEMBER, CAREGIVER:790553}.    Counseling Resources:  ATP IV Guidelines  Pooled Cohorts Equation Calculator  Breast Cancer Risk Calculator  BRCA-Related Cancer Risk Assessment: FHS-7 Tool  FRAX Risk Assessment  ICSI Preventive Guidelines  Dietary Guidelines for " Americans, 2010  USDA's MyPlate  ASA Prophylaxis  Lung CA Screening    Christian Patel, DO  [facility]

## 2021-08-11 NOTE — PATIENT INSTRUCTIONS
Patient Education   Personalized Prevention Plan  You are due for the preventive services outlined below.  Your care team is available to assist you in scheduling these services.  If you have already completed any of these items, please share that information with your care team to update in your medical record.  Health Maintenance Due   Topic Date Due     Zoster (Shingles) Vaccine (2 of 3) 02/26/2008

## 2021-08-19 ENCOUNTER — OFFICE VISIT (OUTPATIENT)
Dept: INTERNAL MEDICINE | Facility: OTHER | Age: 86
End: 2021-08-19
Attending: INTERNAL MEDICINE
Payer: COMMERCIAL

## 2021-08-19 VITALS
BODY MASS INDEX: 23.02 KG/M2 | SYSTOLIC BLOOD PRESSURE: 126 MMHG | TEMPERATURE: 97.6 F | OXYGEN SATURATION: 98 % | HEART RATE: 70 BPM | DIASTOLIC BLOOD PRESSURE: 70 MMHG | WEIGHT: 147 LBS

## 2021-08-19 DIAGNOSIS — E78.5 HYPERLIPIDEMIA LDL GOAL <100: ICD-10-CM

## 2021-08-19 DIAGNOSIS — Z51.81 ENCOUNTER FOR THERAPEUTIC DRUG MONITORING: ICD-10-CM

## 2021-08-19 DIAGNOSIS — M35.3 PMR (POLYMYALGIA RHEUMATICA) (H): ICD-10-CM

## 2021-08-19 DIAGNOSIS — Z00.00 ENCOUNTER FOR MEDICARE ANNUAL WELLNESS EXAM: Primary | ICD-10-CM

## 2021-08-19 DIAGNOSIS — I25.10 CORONARY ARTERY DISEASE INVOLVING NATIVE CORONARY ARTERY OF NATIVE HEART WITHOUT ANGINA PECTORIS: ICD-10-CM

## 2021-08-19 LAB
ALT SERPL W P-5'-P-CCNC: 25 U/L (ref 0–70)
AST SERPL W P-5'-P-CCNC: 26 U/L (ref 0–45)
CHOLEST SERPL-MCNC: 134 MG/DL
CREAT SERPL-MCNC: 1.12 MG/DL (ref 0.66–1.25)
CRP SERPL-MCNC: 31.2 MG/L (ref 0–8)
ERYTHROCYTE [DISTWIDTH] IN BLOOD BY AUTOMATED COUNT: 16.6 % (ref 10–15)
FASTING STATUS PATIENT QL REPORTED: YES
GFR SERPL CREATININE-BSD FRML MDRD: 57 ML/MIN/1.73M2
HCT VFR BLD AUTO: 38 % (ref 40–53)
HDLC SERPL-MCNC: 43 MG/DL
HGB BLD-MCNC: 12.7 G/DL (ref 13.3–17.7)
LDLC SERPL CALC-MCNC: 74 MG/DL
MCH RBC QN AUTO: 30.1 PG (ref 26.5–33)
MCHC RBC AUTO-ENTMCNC: 33.4 G/DL (ref 31.5–36.5)
MCV RBC AUTO: 90 FL (ref 78–100)
NONHDLC SERPL-MCNC: 91 MG/DL
PLATELET # BLD AUTO: 235 10E3/UL (ref 150–450)
RBC # BLD AUTO: 4.22 10E6/UL (ref 4.4–5.9)
TRIGL SERPL-MCNC: 87 MG/DL
WBC # BLD AUTO: 6.7 10E3/UL (ref 4–11)

## 2021-08-19 PROCEDURE — 36415 COLL VENOUS BLD VENIPUNCTURE: CPT | Mod: ZL | Performed by: INTERNAL MEDICINE

## 2021-08-19 PROCEDURE — 84460 ALANINE AMINO (ALT) (SGPT): CPT | Mod: ZL | Performed by: INTERNAL MEDICINE

## 2021-08-19 PROCEDURE — 84450 TRANSFERASE (AST) (SGOT): CPT | Mod: ZL | Performed by: INTERNAL MEDICINE

## 2021-08-19 PROCEDURE — 82565 ASSAY OF CREATININE: CPT | Mod: ZL | Performed by: INTERNAL MEDICINE

## 2021-08-19 PROCEDURE — 86140 C-REACTIVE PROTEIN: CPT | Mod: ZL | Performed by: INTERNAL MEDICINE

## 2021-08-19 PROCEDURE — G0439 PPPS, SUBSEQ VISIT: HCPCS | Performed by: INTERNAL MEDICINE

## 2021-08-19 PROCEDURE — 80061 LIPID PANEL: CPT | Mod: ZL | Performed by: INTERNAL MEDICINE

## 2021-08-19 PROCEDURE — G0463 HOSPITAL OUTPT CLINIC VISIT: HCPCS

## 2021-08-19 PROCEDURE — 85027 COMPLETE CBC AUTOMATED: CPT | Mod: ZL | Performed by: INTERNAL MEDICINE

## 2021-08-19 RX ORDER — NITROGLYCERIN 0.4 MG/1
TABLET SUBLINGUAL
Qty: 30 TABLET | Refills: 1 | Status: SHIPPED | OUTPATIENT
Start: 2021-08-19 | End: 2022-01-20

## 2021-08-19 ASSESSMENT — PAIN SCALES - GENERAL: PAINLEVEL: NO PAIN (0)

## 2021-08-19 NOTE — NURSING NOTE
"Chief Complaint   Patient presents with     Physical       Initial /70 (BP Location: Right arm, Patient Position: Chair, Cuff Size: Adult Regular)   Pulse 70   Temp 97.6  F (36.4  C) (Tympanic)   Wt 66.7 kg (147 lb)   SpO2 98%   BMI 23.02 kg/m   Estimated body mass index is 23.02 kg/m  as calculated from the following:    Height as of 5/28/21: 1.702 m (5' 7\").    Weight as of this encounter: 66.7 kg (147 lb).  Medication Reconciliation: complete  GAIL GONZALES LPN  "

## 2021-08-19 NOTE — PROGRESS NOTES
"SUBJECTIVE:   Christian Akers is a 89 year old male who presents for Preventive Visit.    Physical Health:    In general, how would you rate your overall physical health? good    Outside of work, how many days during the week do you exercise? 6-7 days/week    Outside of work, approximately how many minutes a day do you exercise?15-30 minutes    If you drink alcohol do you typically have >3 drinks per day or >7 drinks per week? No    Do you usually eat at least 4 servings of fruit and vegetables a day, include whole grains & fiber and avoid regularly eating high fat or \"junk\" foods? Yes    Do you have any problems taking medications regularly?  No    Do you have any side effects from medications? none    Needs assistance for the following daily activities: no assistance needed    Which of the following safety concerns are present in your home?  none identified     Hearing impairment: Yes, wears hearing aids    In the past 6 months, have you been bothered by leaking of urine? no    Mental Health:    In general, how would you rate your overall mental or emotional health? excellent  PHQ-2 Score:      Do you feel safe in your environment? Yes    Have you ever done Advance Care Planning? (For example, a Health Directive, POLST, or a discussion with a medical provider or your loved ones about your wishes): Yes, advance care planning is on file.        Fall risk:  Fallen 2 or more times in the past year?: No  Any fall with injury in the past year?: No  Cognitive Screening   1) Repeat 3 items (Leader, Season, Table)    2) Clock draw: ABNORMAL wrong time  3) 3 item recall: Recalls 2 objects   Results: ABNORMAL clock, 1-2 items recalled: PROBABLE COGNITIVE IMPAIRMENT, **INFORM PROVIDER**    Mini-CogTM Copyright LUIS Lin. Licensed by the author for use in [facility]; reprinted with permission (zakia@.[facility]). All rights reserved.      Do you have sleep apnea, excessive snoring or daytime drowsiness?: " "no    Reviewed and updated as needed this visit by clinical staff  Tobacco  Allergies  Meds   Med Hx  Surg Hx  Fam Hx  Soc Hx        Reviewed and updated as needed this visit by Provider                Social History     Tobacco Use     Smoking status: Former Smoker     Smokeless tobacco: Never Used   Substance Use Topics     Alcohol use: Not on file     If you drink alcohol do you typically have >3 drinks per day or >7 drinks per week? No    Alcohol Use 8/19/2021   Prescreen: >3 drinks/day or >7 drinks/week? No         Patient Care Team:  Christian Patel DO as PCP - General (Internal Medicine)  Clinic, [facility] PCP  Christian Patel DO as Assigned PCP  Tee Goyal MD as Assigned Rheumatology Provider    The following health maintenance items are reviewed in Epic and correct as of today:  Health Maintenance Due   Topic Date Due     ZOSTER IMMUNIZATION (2 of 3) 02/26/2008     Labs reviewed in EPIC        Review of Systems  Constitutional, HEENT, cardiovascular, pulmonary, GI, , musculoskeletal, neuro, skin, endocrine and psych systems are negative, except as otherwise noted.    OBJECTIVE:   /70 (BP Location: Right arm, Patient Position: Chair, Cuff Size: Adult Regular)   Pulse 70   Temp 97.6  F (36.4  C) (Tympanic)   Wt 66.7 kg (147 lb)   SpO2 98%   BMI 23.02 kg/m   Estimated body mass index is 23.02 kg/m  as calculated from the following:    Height as of 5/28/21: 1.702 m (5' 7\").    Weight as of this encounter: 66.7 kg (147 lb).  Physical Exam  GENERAL: alert and no distress  EYES: Eyes grossly normal to inspection, PERRL and conjunctivae and sclerae normal   HENT: ear canals and TM's normal, nose and mouth without ulcers or lesions  NECK: no adenopathy, no asymmetry, masses, or scars and thyroid normal to palpation  RESP: lungs clear to auscultation - no rales, rhonchi or wheezes  CV: regular rate and rhythm, normal S1 S2, no S3 or S4, elizabeth systolic murmur " noted,  click or rub, no peripheral edema and peripheral pulses strong  ABDOMEN: soft, nontender, no hepatosplenomegaly, no masses and bowel sounds normal  MS: no gross musculoskeletal defects noted, no edema  SKIN: no suspicious lesions or rashes  NEURO: Normal strength and tone, mentation intact and speech normal  PSYCH: mentation appears normal, affect normal/bright        ASSESSMENT / PLAN:   1. Encounter for Medicare annual wellness exam  - labs were done in June 2021  - Lipid profile today  - will get Shingles Vaccine   - new refill of nitroglycerin      HPI: Patient presents today for annual wellness exam.  He has no new complaints.      Patient has been advised of split billing requirements and indicates understanding: Yes  COUNSELING:  Reviewed preventive health counseling, as reflected in patient instructions      He reports that he has quit smoking. He has never used smokeless tobacco.      Appropriate preventive services were discussed with this patient, including applicable screening as appropriate for cardiovascular disease, diabetes, osteopenia/osteoporosis, and glaucoma.  As appropriate for age/gender, discussed screening for colorectal cancer, prostate cancer, breast cancer, and cervical cancer. Checklist reviewing preventive services available has been given to the patient.    Reviewed patients plan of care and provided an AVS. The Basic Care Plan (routine screening as documented in Health Maintenance) for Christian meets the Care Plan requirement. This Care Plan has been established and reviewed with the Patient.    Counseling Resources:  ATP IV Guidelines  Pooled Cohorts Equation Calculator  Breast Cancer Risk Calculator  Breast Cancer: Medication to Reduce Risk  FRAX Risk Assessment  ICSI Preventive Guidelines  Dietary Guidelines for Americans, 2010  USDA's MyPlate  ASA Prophylaxis  Lung CA Screening      Carlyn Angel RN, NP Student     Case and care plan discussed with student.  I attest and agree  to the documentation/evaluation as noted above.      Christian Patel,   [facility]

## 2021-08-24 ENCOUNTER — ANCILLARY PROCEDURE (OUTPATIENT)
Dept: GENERAL RADIOLOGY | Facility: OTHER | Age: 86
End: 2021-08-24
Attending: INTERNAL MEDICINE
Payer: MEDICARE

## 2021-08-24 ENCOUNTER — OFFICE VISIT (OUTPATIENT)
Dept: INTERNAL MEDICINE | Facility: OTHER | Age: 86
End: 2021-08-24
Attending: INTERNAL MEDICINE
Payer: MEDICARE

## 2021-08-24 VITALS
TEMPERATURE: 97.9 F | DIASTOLIC BLOOD PRESSURE: 70 MMHG | OXYGEN SATURATION: 98 % | SYSTOLIC BLOOD PRESSURE: 118 MMHG | HEART RATE: 69 BPM

## 2021-08-24 DIAGNOSIS — M79.642 PAIN OF LEFT HAND: ICD-10-CM

## 2021-08-24 DIAGNOSIS — M79.642 PAIN OF LEFT HAND: Primary | ICD-10-CM

## 2021-08-24 PROCEDURE — 99213 OFFICE O/P EST LOW 20 MIN: CPT | Performed by: INTERNAL MEDICINE

## 2021-08-24 PROCEDURE — 73140 X-RAY EXAM OF FINGER(S): CPT | Mod: TC,LT,FY

## 2021-08-24 PROCEDURE — G0463 HOSPITAL OUTPT CLINIC VISIT: HCPCS

## 2021-08-24 ASSESSMENT — PAIN SCALES - GENERAL: PAINLEVEL: SEVERE PAIN (7)

## 2021-08-24 NOTE — NURSING NOTE
"Chief Complaint   Patient presents with     Musculoskeletal Problem       Initial /70 (BP Location: Left arm, Patient Position: Chair, Cuff Size: Adult Regular)   Pulse 69   Temp 97.9  F (36.6  C) (Tympanic)   SpO2 98%  Estimated body mass index is 23.02 kg/m  as calculated from the following:    Height as of 5/28/21: 1.702 m (5' 7\").    Weight as of 8/19/21: 66.7 kg (147 lb).  Medication Reconciliation: complete  GAIL GONZALES LPN  "

## 2021-08-24 NOTE — PROGRESS NOTES
Assessment & Plan   Problem List Items Addressed This Visit     None      Visit Diagnoses     Pain of left hand    -  Primary    Relevant Orders    XR Finger Left G/E 2 VW (Clinic Performed)         Xray unremarkable.  Patient given a referral to orthopedics.      20 minutes spent on the date of the encounter doing chart review, review of test results, interpretation of tests, patient visit and documentation            No follow-ups on file.    Christian Patel, [facility] - [place] REN Dow is a 89 year old who presents for the following health issues     HPI   Victor M presents today with left ring finger pain.  Pain has been present 2 days.  No trauma reported.  He states it hurts with flexion and it is stiff.  There is no deformity or erythema present.       Musculoskeletal problem/pain  Onset/Duration: couple days  Description  Location: fingers - left  Joint Swelling: YES  Redness: no  Pain: YES  Warmth: no  Intensity:  moderate  Progression of Symptoms:  worsening  Accompanying signs and symptoms:   Fevers: no  Numbness/tingling/weakness: no  History  Trauma to the area: no  Recent illness:  no  Previous similar problem: no  Previous evaluation:  no  Precipitating or alleviating factors:  Aggravating factors include: none  Therapies tried and outcome: nothing        Review of Systems   Constitutional, HEENT, cardiovascular, pulmonary, gi and gu systems are negative, except as otherwise noted.      Objective    /70 (BP Location: Left arm, Patient Position: Chair, Cuff Size: Adult Regular)   Pulse 69   Temp 97.9  F (36.6  C) (Tympanic)   SpO2 98%   There is no height or weight on file to calculate BMI.  Physical Exam   GENERAL: Alert and no distress  MS: Left ring finger with stiffness and difficulty with flexion of the finger.  Very mild swelling on left ring finger.    SKIN: no suspicious lesions or rashes  PSYCH: mentation appears normal, affect  normal/bright    Office Visit on 08/19/2021   Component Date Value Ref Range Status     Cholesterol 08/19/2021 134  <200 mg/dL Final    Age 0-19 years  Desirable: <170 mg/dL  Borderline high:  170-199 mg/dl  High:            >199 mg/dl    Age 20 years and older  Desirable: <200 mg/dL     Triglycerides 08/19/2021 87  <150 mg/dL Final    0-9 years:  Normal:    Less than 75 mg/dL  Borderline high:  75-99 mg/dL  High:             Greater than or equal to 100 mg/dL    0-19 years:  Normal:    Less than 90 mg/dL  Borderline high:   mg/dL  High:             Greater than or equal to 130 mg/dL    20 years and older:  Normal:    Less than 150 mg/dL  Borderline high:  150-199 mg/dL  High:             200-499 mg/dL  Very high:   Greater than or equal to 500 mg/dL     Direct Measure HDL 08/19/2021 43  >=40 mg/dL Final    0-19 years:       Greater than or equal to 45 mg/dL   Low: Less than 40 mg/dL   Borderline low: 40-44 mg/dL     20 years and older:   Female: Greater than or equal to 50 mg/dL   Male:   Greater than or equal to 40 mg/dL          LDL Cholesterol Calculated 08/19/2021 74  <=100 mg/dL Final    Age 0-19 years:  Desirable: 0-110 mg/dL   Borderline high: 110-129 mg/dL   High: >= 130 mg/dL    Age 20 years and older:  Desirable: <100mg/dL  Above desirable: 100-129 mg/dL   Borderline high: 130-159 mg/dL   High: 160-189 mg/dL   Very high: >= 190 mg/dL     Non HDL Cholesterol 08/19/2021 91  <130 mg/dL Final    0-19 years:  Desirable:          Less than 120 mg/dL  Borderline high:   120-144 mg/dL  High:                   Greater than or equal to 145 mg/dL    20 years and older:  Desirable:          130 mg/dL  Above Desirable: 130-159 mg/dL  Borderline high:   160-189 mg/dL  High:               190-219 mg/dL  Very high:     Greater than or equal to 220 mg/dL     Patient Fasting > 8hrs? 08/19/2021 Yes   Final     WBC Count 08/19/2021 6.7  4.0 - 11.0 10e3/uL Final     RBC Count 08/19/2021 4.22* 4.40 - 5.90 10e6/uL Final      Hemoglobin 08/19/2021 12.7* 13.3 - 17.7 g/dL Final     Hematocrit 08/19/2021 38.0* 40.0 - 53.0 % Final     MCV 08/19/2021 90  78 - 100 fL Final     MCH 08/19/2021 30.1  26.5 - 33.0 pg Final     MCHC 08/19/2021 33.4  31.5 - 36.5 g/dL Final     RDW 08/19/2021 16.6* 10.0 - 15.0 % Final     Platelet Count 08/19/2021 235  150 - 450 10e3/uL Final     CRP Inflammation 08/19/2021 31.2* 0.0 - 8.0 mg/L Final     Creatinine 08/19/2021 1.12  0.66 - 1.25 mg/dL Final     GFR Estimate 08/19/2021 57* >60 mL/min/1.73m2 Final    As of July 11, 2021, eGFR is calculated by the CKD-EPI creatinine equation, without race adjustment. eGFR can be influenced by muscle mass, exercise, and diet. The reported eGFR is an estimation only and is only applicable if the renal function is stable.     AST 08/19/2021 26  0 - 45 U/L Final     ALT 08/19/2021 25  0 - 70 U/L Final     No results found for any visits on 08/24/21.  No results found for this or any previous visit (from the past 24 hour(s)).

## 2021-08-25 ENCOUNTER — TELEPHONE (OUTPATIENT)
Dept: INTERNAL MEDICINE | Facility: OTHER | Age: 86
End: 2021-08-25

## 2021-08-25 NOTE — TELEPHONE ENCOUNTER
Call from patient returning call of xrays-finger. Notified arthritis per Dr. Patel. Patient verbalized understanding.

## 2021-09-03 ENCOUNTER — ANCILLARY PROCEDURE (OUTPATIENT)
Dept: GENERAL RADIOLOGY | Facility: OTHER | Age: 86
End: 2021-09-03
Attending: FAMILY MEDICINE
Payer: MEDICARE

## 2021-09-03 ENCOUNTER — OFFICE VISIT (OUTPATIENT)
Dept: FAMILY MEDICINE | Facility: OTHER | Age: 86
End: 2021-09-03
Attending: FAMILY MEDICINE
Payer: MEDICARE

## 2021-09-03 VITALS
TEMPERATURE: 97.4 F | HEIGHT: 68 IN | WEIGHT: 146.2 LBS | HEART RATE: 70 BPM | DIASTOLIC BLOOD PRESSURE: 64 MMHG | SYSTOLIC BLOOD PRESSURE: 132 MMHG | OXYGEN SATURATION: 99 % | BODY MASS INDEX: 22.16 KG/M2 | RESPIRATION RATE: 16 BRPM

## 2021-09-03 DIAGNOSIS — M25.512 ACUTE PAIN OF LEFT SHOULDER: Primary | ICD-10-CM

## 2021-09-03 DIAGNOSIS — M25.512 ACUTE PAIN OF LEFT SHOULDER: ICD-10-CM

## 2021-09-03 PROCEDURE — 99213 OFFICE O/P EST LOW 20 MIN: CPT | Performed by: FAMILY MEDICINE

## 2021-09-03 PROCEDURE — G0463 HOSPITAL OUTPT CLINIC VISIT: HCPCS | Mod: 25

## 2021-09-03 PROCEDURE — 73030 X-RAY EXAM OF SHOULDER: CPT | Mod: TC,LT,FY

## 2021-09-03 PROCEDURE — G0463 HOSPITAL OUTPT CLINIC VISIT: HCPCS

## 2021-09-03 ASSESSMENT — MIFFLIN-ST. JEOR: SCORE: 1292.66

## 2021-09-03 ASSESSMENT — PAIN SCALES - GENERAL: PAINLEVEL: SEVERE PAIN (6)

## 2021-09-03 NOTE — PROGRESS NOTES
"    Assessment & Plan     1. Acute pain of left shoulder  Will refer to Orthopedics, consider injections vs other.  Follow-up as needed.  - XR Shoulder Left G/E 2 Views; Future  - Orthopedic  Referral; Future      Return if symptoms worsen or fail to improve.    Afia Zurita MD  [facility] - MARLA Dow is a 89 year old who presents for the following health issues     HPI     Musculoskeletal problem/pain  Onset/Duration: 3-4 days  Description  Location: elbow and shoulder - left  Joint Swelling: no  Redness: no  Pain: YES  Warmth: no  Intensity:  mild  Progression of Symptoms:  worsening  Accompanying signs and symptoms:   Fevers: no  Numbness/tingling/weakness: no  History  Trauma to the area: no  Recent illness:  no  Previous similar problem: YES- shoulder only   Previous evaluation:  YES  Precipitating or alleviating factors:  Aggravating factors include: bending or moving it  Therapies tried and outcome: rest/inactivity, heat, ice, acetaminophen and Ibuprofen - only helping measure was heat      Review of Systems   Constitutional, HEENT, cardiovascular, pulmonary, gi and gu systems are negative, except as otherwise noted.      Objective    /64 (BP Location: Left arm, Patient Position: Sitting, Cuff Size: Adult Regular)   Pulse 70   Temp 97.4  F (36.3  C) (Tympanic)   Resp 16   Ht 1.727 m (5' 8\")   Wt 66.3 kg (146 lb 3.2 oz)   SpO2 99%   BMI 22.23 kg/m    Body mass index is 22.23 kg/m .  Physical Exam   GENERAL: healthy, alert and no distress  MS: tenderness over AC joint, mildly decreased range of motion with abduction and forward flexion  PSYCH: mentation appears normal, affect normal/bright    Xray showed fairly severe arthritis in shoulder joint, AC arthritis also noted          "

## 2021-09-03 NOTE — NURSING NOTE
"Chief Complaint   Patient presents with     Musculoskeletal Problem       Initial /64 (BP Location: Left arm, Patient Position: Sitting, Cuff Size: Adult Regular)   Pulse 70   Temp 97.4  F (36.3  C) (Tympanic)   Resp 16   Ht 1.727 m (5' 8\")   Wt 66.3 kg (146 lb 3.2 oz)   SpO2 99%   BMI 22.23 kg/m   Estimated body mass index is 22.23 kg/m  as calculated from the following:    Height as of this encounter: 1.727 m (5' 8\").    Weight as of this encounter: 66.3 kg (146 lb 3.2 oz).  Medication Reconciliation: complete  Sada Ponce MA  "

## 2021-09-08 ENCOUNTER — OFFICE VISIT (OUTPATIENT)
Dept: ORTHOPEDICS | Facility: OTHER | Age: 86
End: 2021-09-08
Attending: FAMILY MEDICINE
Payer: MEDICARE

## 2021-09-08 VITALS
HEART RATE: 65 BPM | SYSTOLIC BLOOD PRESSURE: 104 MMHG | WEIGHT: 146 LBS | TEMPERATURE: 96.2 F | OXYGEN SATURATION: 98 % | BODY MASS INDEX: 22.2 KG/M2 | DIASTOLIC BLOOD PRESSURE: 64 MMHG

## 2021-09-08 DIAGNOSIS — M25.512 ACUTE PAIN OF LEFT SHOULDER: ICD-10-CM

## 2021-09-08 PROCEDURE — 20610 DRAIN/INJ JOINT/BURSA W/O US: CPT | Mod: LT | Performed by: SPECIALIST

## 2021-09-08 PROCEDURE — G0463 HOSPITAL OUTPT CLINIC VISIT: HCPCS

## 2021-09-08 PROCEDURE — 20610 DRAIN/INJ JOINT/BURSA W/O US: CPT | Mod: LT

## 2021-09-08 PROCEDURE — 99203 OFFICE O/P NEW LOW 30 MIN: CPT | Mod: 25 | Performed by: SPECIALIST

## 2021-09-08 PROCEDURE — G0463 HOSPITAL OUTPT CLINIC VISIT: HCPCS | Mod: 25

## 2021-09-08 RX ORDER — LIDOCAINE HYDROCHLORIDE 10 MG/ML
5 INJECTION, SOLUTION INFILTRATION; PERINEURAL ONCE
Status: COMPLETED | OUTPATIENT
Start: 2021-09-08 | End: 2021-09-08

## 2021-09-08 RX ORDER — TRIAMCINOLONE ACETONIDE 40 MG/ML
40 INJECTION, SUSPENSION INTRA-ARTICULAR; INTRAMUSCULAR ONCE
Status: COMPLETED | OUTPATIENT
Start: 2021-09-08 | End: 2021-09-08

## 2021-09-08 RX ADMIN — LIDOCAINE HYDROCHLORIDE 5 ML: 10 INJECTION, SOLUTION INFILTRATION; PERINEURAL at 13:03

## 2021-09-08 RX ADMIN — TRIAMCINOLONE ACETONIDE 40 MG: 40 INJECTION, SUSPENSION INTRA-ARTICULAR; INTRAMUSCULAR at 13:03

## 2021-09-08 ASSESSMENT — PAIN SCALES - GENERAL: PAINLEVEL: EXTREME PAIN (8)

## 2021-09-08 NOTE — PROGRESS NOTES
Visit Date: 09/08/2021    HISTORY OF PRESENT ILLNESS:  The patient is a 89-year-old right-hand dominant male seen today for evaluation of his left shoulder. He has been complaining of left shoulder pain for years, has not really had much treatment to date other than taking Tylenol over-the-counter, does seem to help some of his current pain level is 8/10.  He also complains of some ratcheting decreased range of motion and decreased strength.  Does not recall any specific trauma, injury, overuse or activity associated with the onset of his symptoms.    PHYSICAL EXAMINATION:    GENERAL:  The patient is awake, alert, oriented, no acute distress.  Overall, general mood and affect appearance are normal.    VITAL SIGNS:  Weight 146, blood pressure 104/64, pulse 65, temperature 96.2.  MUSCULOSKELETAL:  On evaluation of the left shoulder the patient has some ratcheting with movement.  He has external rotation to 30, forward flexion to 80, abduction about 80, internal rotation to the iliac crest.  He is neurovascularly intact distally with normal motor and sensory function of the radial, ulnar, and median nerve distribution.  He has some tenderness anteriorly and posteriorly in the shoulder.  The AC joint is prominent, but nontender.  He has reasonable strength with internal rotation, external rotation, forward flexion and abduction.  X-rays are reviewed dated 09/03/2021 included AP with internal and external rotation, Y view, x-rays shows severe glenohumeral osteoarthritic changes with flattening of the humeral head and a large osteophyte inferiorly.    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    PLAN:  We talked to the patient about the options of treating this conservatively with injections, medications, avoidance of aggravating activities.  local pain relief modalities versus surgery in the form of total shoulder arthroplasty.  The patient prefers not to have surgery, he is 89 years old, would  prefer to avoid surgery if possible.  He requested an injection.  I think this is reasonable.  The left shoulder was prepped posteriorly.  We injected the glenohumeral joint with a mixture containing 1 mL of 40 mg per mL of Kenalog and 5 mL of 1% lidocaine.  The patient tolerated this well.    Sebastián Salmeron MD        D: 2021   T: 2021   MT: [id number]    Name:     TORIN TORRES  MRN:      [id number]        Account:    [id number]   :      1930           Visit Date: 2021     Document: [id number]

## 2021-09-08 NOTE — NURSING NOTE
"Chief Complaint   Patient presents with     Consult For     Left Shoulder         Initial /64 (BP Location: Left arm, Patient Position: Sitting, Cuff Size: Adult Regular)   Pulse 65   Temp (!) 96.2  F (35.7  C) (Tympanic)   Wt 66.2 kg (146 lb)   SpO2 98%   BMI 22.20 kg/m   Estimated body mass index is 22.2 kg/m  as calculated from the following:    Height as of 9/3/21: 1.727 m (5' 8\").    Weight as of this encounter: 66.2 kg (146 lb).  Medication Reconciliation: complete  Carrie Dhillon LPN  "

## 2021-10-03 ENCOUNTER — HEALTH MAINTENANCE LETTER (OUTPATIENT)
Age: 86
End: 2021-10-03

## 2021-10-05 ENCOUNTER — MYC MEDICAL ADVICE (OUTPATIENT)
Dept: RHEUMATOLOGY | Facility: CLINIC | Age: 86
End: 2021-10-05

## 2021-10-05 DIAGNOSIS — I10 ESSENTIAL HYPERTENSION: ICD-10-CM

## 2021-10-06 RX ORDER — METOPROLOL SUCCINATE 25 MG/1
25 TABLET, EXTENDED RELEASE ORAL DAILY
Qty: 90 TABLET | Refills: 2 | Status: SHIPPED | OUTPATIENT
Start: 2021-10-06 | End: 2022-07-26

## 2021-10-07 NOTE — TELEPHONE ENCOUNTER
Please confirm that the patient remains on methotrexate, and the dose of that medication, as per my note from July 2021.    Impression, based on the daughter's description, is of flaring polymyalgia rheumatica.  I recommend increasing prednisone to 5 mg twice daily until the end of October.  Then I recommend reducing prednisone by 1 mg/week (9 mg/week 1, 8 mg/week 2, etc.) back down to 2 mg daily.  Recheck CRP in early November.

## 2021-10-08 ENCOUNTER — MYC MEDICAL ADVICE (OUTPATIENT)
Dept: RHEUMATOLOGY | Facility: CLINIC | Age: 86
End: 2021-10-08

## 2021-10-08 DIAGNOSIS — M35.3 PMR (POLYMYALGIA RHEUMATICA) (H): Primary | ICD-10-CM

## 2021-10-08 RX ORDER — PREDNISONE 5 MG/1
5 TABLET ORAL 2 TIMES DAILY
Qty: 60 TABLET | Refills: 2 | Status: SHIPPED | OUTPATIENT
Start: 2021-10-08 | End: 2022-01-25

## 2021-10-08 NOTE — TELEPHONE ENCOUNTER
Reviewed EMR for a consent to discuss pt's medical condition with his daughter, Amarilis. We do not have this consent. Called Christian on 10/08/21 10:46 AM and left a message asking that pt return call. If patient calls back please, I ask that the Call center contact RN Care Coordinator at [phone] so that I can connect with pt.    I need to relay Dr Goyal's response. [place]mon message also sent to pt with Dr Goyal's recommendations. Asked that they get back to us with any questions.    IFTIKHAR GarciaN, RN  Rheumatology Care Coordinator  [facility]

## 2021-10-08 NOTE — TELEPHONE ENCOUNTER
Prednisone prescription set up and routed to Dr Goyal for completion.    IFTIKHAR GarciaN, RN  Rheumatology Care Coordinator  [facility]

## 2021-10-20 ENCOUNTER — TRANSFERRED RECORDS (OUTPATIENT)
Dept: HEALTH INFORMATION MANAGEMENT | Facility: CLINIC | Age: 86
End: 2021-10-20

## 2021-11-01 ENCOUNTER — LAB (OUTPATIENT)
Dept: LAB | Facility: OTHER | Age: 86
End: 2021-11-01
Payer: MEDICARE

## 2021-11-01 DIAGNOSIS — Z51.81 ENCOUNTER FOR THERAPEUTIC DRUG MONITORING: ICD-10-CM

## 2021-11-01 DIAGNOSIS — M35.3 PMR (POLYMYALGIA RHEUMATICA) (H): ICD-10-CM

## 2021-11-01 LAB
ALT SERPL W P-5'-P-CCNC: 34 U/L (ref 0–70)
AST SERPL W P-5'-P-CCNC: 25 U/L (ref 0–45)
CREAT SERPL-MCNC: 1.13 MG/DL (ref 0.66–1.25)
CRP SERPL-MCNC: 10.5 MG/L (ref 0–8)
ERYTHROCYTE [DISTWIDTH] IN BLOOD BY AUTOMATED COUNT: 20 % (ref 10–15)
GFR SERPL CREATININE-BSD FRML MDRD: 57 ML/MIN/1.73M2
HCT VFR BLD AUTO: 40.4 % (ref 40–53)
HGB BLD-MCNC: 13.2 G/DL (ref 13.3–17.7)
MCH RBC QN AUTO: 30.1 PG (ref 26.5–33)
MCHC RBC AUTO-ENTMCNC: 32.7 G/DL (ref 31.5–36.5)
MCV RBC AUTO: 92 FL (ref 78–100)
PLATELET # BLD AUTO: 201 10E3/UL (ref 150–450)
RBC # BLD AUTO: 4.39 10E6/UL (ref 4.4–5.9)
WBC # BLD AUTO: 12.6 10E3/UL (ref 4–11)

## 2021-11-01 PROCEDURE — 82565 ASSAY OF CREATININE: CPT | Mod: ZL

## 2021-11-01 PROCEDURE — 86140 C-REACTIVE PROTEIN: CPT | Mod: ZL

## 2021-11-01 PROCEDURE — 85027 COMPLETE CBC AUTOMATED: CPT | Mod: ZL

## 2021-11-01 PROCEDURE — 84450 TRANSFERASE (AST) (SGOT): CPT | Mod: ZL

## 2021-11-01 PROCEDURE — 84460 ALANINE AMINO (ALT) (SGPT): CPT | Mod: ZL

## 2021-11-01 PROCEDURE — 36415 COLL VENOUS BLD VENIPUNCTURE: CPT | Mod: ZL

## 2021-11-04 ENCOUNTER — ALLIED HEALTH/NURSE VISIT (OUTPATIENT)
Dept: FAMILY MEDICINE | Facility: OTHER | Age: 86
End: 2021-11-04
Attending: INTERNAL MEDICINE
Payer: COMMERCIAL

## 2021-11-04 VITALS — SYSTOLIC BLOOD PRESSURE: 144 MMHG | OXYGEN SATURATION: 98 % | DIASTOLIC BLOOD PRESSURE: 88 MMHG | HEART RATE: 90 BPM

## 2021-11-04 DIAGNOSIS — Z01.30 BLOOD PRESSURE CHECK: Primary | ICD-10-CM

## 2021-11-04 PROCEDURE — 99207 PR NO CHARGE NURSE ONLY: CPT

## 2021-11-04 NOTE — PROGRESS NOTES
Patient presented today for a blood pressure check.    BP Readings from Last 3 Encounters:   11/04/21 (!) 144/88   09/08/21 104/64   09/03/21 132/64

## 2021-11-06 DIAGNOSIS — K21.00 GASTROESOPHAGEAL REFLUX DISEASE WITH ESOPHAGITIS, UNSPECIFIED WHETHER HEMORRHAGE: ICD-10-CM

## 2021-11-08 NOTE — TELEPHONE ENCOUNTER
Prilosec       Last Written Prescription Date:  11/3/2020  Last Fill Quantity: 90,   # refills: 4  Last Office Visit: 9/3/2021  Future Office visit:

## 2021-12-29 ENCOUNTER — TELEPHONE (OUTPATIENT)
Dept: RHEUMATOLOGY | Facility: CLINIC | Age: 86
End: 2021-12-29
Payer: COMMERCIAL

## 2021-12-29 DIAGNOSIS — Z51.81 ENCOUNTER FOR THERAPEUTIC DRUG MONITORING: ICD-10-CM

## 2021-12-29 DIAGNOSIS — Z87.39 HISTORY OF CALCIUM PYROPHOSPHATE DEPOSITION DISEASE (CPPD): ICD-10-CM

## 2021-12-29 DIAGNOSIS — M35.3 PMR (POLYMYALGIA RHEUMATICA) (H): ICD-10-CM

## 2021-12-29 DIAGNOSIS — M72.2 PLANTAR FASCIITIS: ICD-10-CM

## 2021-12-29 DIAGNOSIS — Z87.39 HX OF CALCIUM PYROPHOSPHATE DEPOSITION DISEASE (CPPD): ICD-10-CM

## 2021-12-29 DIAGNOSIS — D64.9 ANEMIA, UNSPECIFIED TYPE: ICD-10-CM

## 2021-12-29 NOTE — TELEPHONE ENCOUNTER
Health Call Center    Phone Message    May a detailed message be left on voicemail: yes     Reason for Call: Other: Needs Referral to [place] to [facility] Rheumatology    Pt is requesting if a referral can be sent to [facility]. the Pt is looking to transfer care to a Rheumatologist closer to Pt's home, for transportation to the [facility] is getting harder for the Pt.    Pt's daughter, Shannan, states they need a referral sent to   [facility] Fax #: [phone] to Dr. Vijaya Rudolph; before they can schedule an appointment.      Shannan would like a call back to let her know once referral has been sent.  Also she would like to know what the process would be in the meantime for the new provider at [facility] is booking about 3 months out. Will Dr. Goyal still be able to approve medications and does the Pt still need to go in to complete lab work?    Please call Shannan back to let her know.      Action Taken: Message routed to:  [facility] Adult Rheumatology

## 2022-01-03 NOTE — TELEPHONE ENCOUNTER
We are happy to provide all rheumatology notes, labs, and imaging results and assistance to new rheumatology provider.  I recommend that referral to rheumatology be placed by patient's primary care provider.  I will be.  Happy to continue prescribing medications up until patient sees new provider

## 2022-01-03 NOTE — TELEPHONE ENCOUNTER
Per patient request, referral to [facility] Rheumatology pended and sent to Dr. Goyal.   Mary Urban RN  Adult Rheumatology Clinic

## 2022-01-06 NOTE — TELEPHONE ENCOUNTER
Return call placed to patient's daughter Amarilis to confirm that referral has been sent to [facility] rheumatology clinic and that Dr. Goyal will continue to refill medication until patient establishes care with a new provider. Requested that patient have routine methotrexate monitoring labs done again in February 2022 to facilitate refills, Amarilis verbalized understanding and agrees to plan.  Mary Urban RN  Adult Rheumatology Clinic

## 2022-01-11 ENCOUNTER — TRANSFERRED RECORDS (OUTPATIENT)
Dept: HEALTH INFORMATION MANAGEMENT | Facility: CLINIC | Age: 87
End: 2022-01-11
Payer: COMMERCIAL

## 2022-01-11 LAB
CREATININE (EXTERNAL): 0.94 MG/DL (ref 0.7–1.2)
GFR ESTIMATED (EXTERNAL): >60 ML/MIN/1.73M2
GLUCOSE (EXTERNAL): 90 MG/DL (ref 70–99)
INR (EXTERNAL): 1 (ref 0.9–1.1)
POTASSIUM (EXTERNAL): 3.7 MEQ/L (ref 3.4–5.1)

## 2022-01-12 ENCOUNTER — TRANSFERRED RECORDS (OUTPATIENT)
Dept: HEALTH INFORMATION MANAGEMENT | Facility: CLINIC | Age: 87
End: 2022-01-12
Payer: COMMERCIAL

## 2022-01-13 ENCOUNTER — TRANSFERRED RECORDS (OUTPATIENT)
Dept: HEALTH INFORMATION MANAGEMENT | Facility: CLINIC | Age: 87
End: 2022-01-13
Payer: COMMERCIAL

## 2022-01-13 LAB — EJECTION FRACTION: 53.5 %

## 2022-01-18 ENCOUNTER — MYC MEDICAL ADVICE (OUTPATIENT)
Dept: INTERNAL MEDICINE | Facility: OTHER | Age: 87
End: 2022-01-18
Payer: COMMERCIAL

## 2022-01-18 DIAGNOSIS — J30.89 SEASONAL ALLERGIC RHINITIS DUE TO OTHER ALLERGIC TRIGGER: Primary | ICD-10-CM

## 2022-01-18 DIAGNOSIS — I25.10 CORONARY ARTERY DISEASE INVOLVING NATIVE CORONARY ARTERY OF NATIVE HEART WITHOUT ANGINA PECTORIS: ICD-10-CM

## 2022-01-18 RX ORDER — CETIRIZINE HYDROCHLORIDE 10 MG/1
10 TABLET ORAL DAILY
Qty: 90 TABLET | Refills: 1 | Status: SHIPPED | OUTPATIENT
Start: 2022-01-18 | End: 2023-12-29

## 2022-01-20 RX ORDER — NITROGLYCERIN 0.4 MG/1
TABLET SUBLINGUAL
Qty: 25 TABLET | Refills: 1 | Status: SHIPPED | OUTPATIENT
Start: 2022-01-20

## 2022-01-20 NOTE — TELEPHONE ENCOUNTER
NITROSTAT      Last Written Prescription Date:  8/19/2021  Last Fill Quantity: 30,   # refills: 1  Last Office Visit: 8/24/2021  Future Office visit:    Next 5 appointments (look out 90 days)    Jan 25, 2022 10:00 AM  (Arrive by 9:45 AM)  SHORT with Christian Patel DO  [facility] ([facility] ) [address]  [place] [id number] [phone]           Routing refill request to provider for review/approval because:

## 2022-01-22 ENCOUNTER — TRANSFERRED RECORDS (OUTPATIENT)
Dept: HEALTH INFORMATION MANAGEMENT | Facility: CLINIC | Age: 87
End: 2022-01-22
Payer: COMMERCIAL

## 2022-01-22 LAB
CREATININE (EXTERNAL): 1.19 MG/DL (ref 0.7–1.2)
GFR ESTIMATED (EXTERNAL): 57 ML/MIN/1.73M2
GLUCOSE (EXTERNAL): 97 MG/DL (ref 70–99)
POTASSIUM (EXTERNAL): 3.7 MEQ/L (ref 3.4–5.1)

## 2022-01-25 ENCOUNTER — OFFICE VISIT (OUTPATIENT)
Dept: INTERNAL MEDICINE | Facility: OTHER | Age: 87
End: 2022-01-25
Attending: INTERNAL MEDICINE
Payer: COMMERCIAL

## 2022-01-25 VITALS
HEART RATE: 107 BPM | OXYGEN SATURATION: 97 % | SYSTOLIC BLOOD PRESSURE: 130 MMHG | TEMPERATURE: 96.8 F | DIASTOLIC BLOOD PRESSURE: 70 MMHG | WEIGHT: 152 LBS | BODY MASS INDEX: 23.11 KG/M2

## 2022-01-25 DIAGNOSIS — I44.2 HEART BLOCK AV THIRD DEGREE (H): ICD-10-CM

## 2022-01-25 DIAGNOSIS — E03.9 ACQUIRED HYPOTHYROIDISM: Primary | ICD-10-CM

## 2022-01-25 DIAGNOSIS — I21.4 NSTEMI (NON-ST ELEVATED MYOCARDIAL INFARCTION) (H): ICD-10-CM

## 2022-01-25 LAB
T4 FREE SERPL-MCNC: 1.12 NG/DL (ref 0.76–1.46)
TSH SERPL DL<=0.005 MIU/L-ACNC: 5.64 MU/L (ref 0.4–4)

## 2022-01-25 PROCEDURE — 36415 COLL VENOUS BLD VENIPUNCTURE: CPT | Mod: ZL | Performed by: INTERNAL MEDICINE

## 2022-01-25 PROCEDURE — 84439 ASSAY OF FREE THYROXINE: CPT | Mod: ZL | Performed by: INTERNAL MEDICINE

## 2022-01-25 PROCEDURE — G0463 HOSPITAL OUTPT CLINIC VISIT: HCPCS

## 2022-01-25 PROCEDURE — 84443 ASSAY THYROID STIM HORMONE: CPT | Mod: ZL | Performed by: INTERNAL MEDICINE

## 2022-01-25 PROCEDURE — 99214 OFFICE O/P EST MOD 30 MIN: CPT | Performed by: INTERNAL MEDICINE

## 2022-01-25 ASSESSMENT — PAIN SCALES - GENERAL: PAINLEVEL: NO PAIN (0)

## 2022-01-25 NOTE — NURSING NOTE
"Chief Complaint   Patient presents with     Hospital F/U       Initial /70 (BP Location: Left arm, Patient Position: Chair, Cuff Size: Adult Regular)   Pulse 107   Temp 96.8  F (36  C) (Tympanic)   Wt 68.9 kg (152 lb)   SpO2 97%   BMI 23.11 kg/m   Estimated body mass index is 23.11 kg/m  as calculated from the following:    Height as of 9/3/21: 1.727 m (5' 8\").    Weight as of this encounter: 68.9 kg (152 lb).  Medication Reconciliation: complete  GAIL GONZALES LPN  "

## 2022-01-25 NOTE — PROGRESS NOTES
Assessment & Plan   Problem List Items Addressed This Visit        Circulatory    Heart block AV third degree (H)      Other Visit Diagnoses     Acquired hypothyroidism    -  Primary    Relevant Orders    TSH    T4, free    NSTEMI (non-ST elevated myocardial infarction) (H) :  PCI done but no lesions were stented.                   30 minutes spent on the date of the encounter doing chart review, review of outside records, review of test results, interpretation of tests, patient visit and documentation            No follow-ups on file.    Christian Patel, DO  [facility]    Kyara Dow is a 89 year old who presnts for the following health issues     HPI     Victor M presents today for follow up.  He presented to the ER on 1/11/22 due to fatigue and vague sense of weakness.  During that evaluation he was found to have elevated troponins and subsequently was sent to [place] where he underwent PCI.  He does have some diffuse moderate CAD but no stents were placed.  Echo indicated EF of 53.5%.  Cardiac catheterization was performed due to atypical symptoms and   positive troponin.   Findings included:   1.  Right coronary artery with 50% mid stenosis, similar to prior   angiogram.   2.  Left main coronary artery with mild luminal irregularities.   3.  Nondominant circumflex artery with 1 prominent OM with 50% proximal   stenosis, similar to prior angiogram.   4.  Patent stent in the mid LAD with 60% stenosis.  Findings appeared   slightly worse from immediate post PCI images and previous angiogram.     Stent appears a little underexpanded.  FFR across this lesion was not   hemodynamically significant.  Distal LAD with 2 separate lesion of 70-80%.    1 prominent diagonal artery with 60% ostial stenosis.   5.  Ramus intermedius artery with mild luminal irregularities.   Coronary Findings Diagnostic Dominance: Right   Left Anterior Descending: Mid LAD lesion is 60% stenosed. The lesion was  previously treated using stent (unknown type). Dist LAD-1 lesion is 70% stenosed. Dist LAD-2 lesion is 70% stenosed.   First Obtuse Marginal Branch: 1st Mrg lesion is 50% stenosed.   Right Coronary Artery: Prox RCA to Mid RCA lesion is 50% stenosed.     Intervention   Mid LAD lesion: PCI: The pre-interventional distal flow is normal (LUIS 3). The post-interventional distal flow is normal (LUIS 3). Pressure wire/FFR was performed on the lesion. Pressure/FFR wire supply: GUIDEWIRE FFR 185CM COMET II [id number]. Adenosine wire/FFR: 0.88. 140 mcg of adenosine was used. Pressure wire/iFR was used. Baseline pressure wire/Adenosine free FFR: 0.9. Adenosine free FFR wire used: GUIDEWIRE FFR 185CM COMET II [id number]. Myocardial blush grade is 3. There is a 60% residual stenosis post intervention.     Recommendations   1. Stable disease in the RCA as well as OM compared to previous angiogram   2. Slightly underexpanded mid LAD stent with 60% in-stent restenosis, FFR is not hemodynamically significant.   3. 70 to 80% stenosis noted in small distal LAD, to be treated with medical therapy.   4. No clear culprit lesion noted.   5. In the absence of chest pain and current presentation with very atypical symptoms, medical therapy would be reasonable.   6. Discussed with referring cardiologist, Dr. Ferguson  Specimen Collected: -- Last Resulted: 22  5:19 PM   Received From: [facility]  Result Received: 22 10:18 AM          RESULT  [address] [address]   Phone [phone]   Fax [phone]                                                                  Transthoracic Echocardiogram Report   Name: TORIN TORRES                  Study Date: 2022   MRN: [id number]                               Performing Location: [facility]   : 1930                           Gender: Male   Height: 67 in                             Age: 89  yrs   Weight: 146 lb                            BSA: 1.8 m2   BP: 146/83 mmHg                           HR: 70   Ordering Physician: MILENA TAVERA   Referring Physician: LO PEREZ   Performed By: Lyndsey Hughes RDCS   Reason For Study: Chest pain/anginal equiv, ECGs and troponins normal     Interpretation Summary   The left ventricular systolic function is low normal.There is moderate concentric hypertrophy.The left ventricular diastolic function is mildly abnormal (Grade   I).   Normal right ventricular systolic function.   Left atrium is moderately enlarged by volume.There is biatrial enlargement.   There is mild aortic valve calcification.There is mild aortic stenosis.   Severe mitral valve annular calcification.Mild mitral valve stenosis.There is mild mitral regurgitation.   Mild tricuspid regurgitation.   Trace or physiologic pulmonic regurgitation.   The aortic root is normal in size for body surface area.     No change     Left Ventricle   The left ventricular systolic function is low normal. Quantified left ventricle ejection fraction is 53.5 %. The left ventricle is normal size. There is   moderate concentric hypertrophy. There is borderline global hypokinesis of the left ventricle.     Right Ventricle   The right ventricular systolic function is normal as assessed by tricuspid annular plane systolic excursion (TAPSE). Normal right ventricular systolic function.   The right ventricular cavity size is qualitatively normal.     Atria   There is biatrial enlargement. Left atrium is moderately enlarged by volume. Left atrium is 40.4 ml/m^2 by volume. Right atrium is qualitatively enlarged.     Diastolic Function   The left ventricular diastolic function is mildly abnormal (Grade I).     Aortic Valve   There is mild aortic valve calcification. The aortic valve is tricuspid. Trace aortic insufficiency. There is mild aortic stenosis. Aortic stenosis peak   velocity is 228.1 cm/sec. Aortic  valve mean gradient is 10.9 mmHg. Calculated aortic valve area is 1.2 cm^2. Dimensionless index is 0.52 .     Mitral Valve   Severe mitral valve annular calcification. There is mild mitral regurgitation. Mild mitral valve stenosis. Mean mitral gradient is 5.8 mmHg.     Tricuspid Valve   The tricuspid valve anatomy is normal. Mild tricuspid regurgitation. There is no tricuspid stenosis.     Pulmonic Valve   Pulmonic valve not well visualized. Trace or physiologic pulmonic regurgitation. No pulmonic stenosis.     Pericardium/Pleura   There is no pericardial effusion.     Vessels   The aortic root is normal in size for body surface area. Aortic root measures 3.2 cm cm in diameter.     Hemodynamics   TR signal inadequate to allow accurate estimate RV systolic pressure. Inferior vena cava size normal and collapsibility > 50% normal indicating normal right   atrial pressure (3 mm Hg).     Procedure Details   A two-dimensional transthoracic echocardiogram with color flow and Doppler was performed.     MMode/2D Measurements & Calculations   IVSd: 1.4 cm                                                                             LVIDd: 4.3 cm   LVPWd: 1.4 cm                                                                            LVIDs: 3.3 cm   Ao root diam: 3.2 cm                                                                     IVS/LVPW: 0.96                     _________________________________________________________________________________________________________________________________   LAV(MOD-bp): 71.5 ml                                                                     EDV(Teich): 82.4 ml   LAV(MOD-bp) index: 40.4 ml/m2                                                            ESV(Teich): 44.2 ml   LAV(MOD-sp2): 73.0 ml                                                                    EF(Teich): 46.3 %   LAV(MOD-sp4): 68.4 ml                      _________________________________________________________________________________________________________________________________   LV mass(C)d: 229.8 grams                                                                 IVC diam: 1.3 cm     LV mass(C)dI: 129.9 grams/m2                   _________________________________________________________________________________________________________________________________   LVOT diam: 1.7 cm                                                                        EDV(MOD-sp4): 45.3 ml                                                                                            EDV(MOD-sp2): 36.8 ml                                                                                            EDV(MOD-bp): 41.7 ml                                                                                            EDV(sp4-el): 49.5 ml                                                                                            ESV(MOD-sp4): 21.5 ml                                                                                            EF(MOD-sp4): 52.4 %                   _________________________________________________________________________________________________________________________________   ESV(MOD-bp): 19.4 ml                                                                     SV(MOD-bp): 22.3 ml   EF(MOD-bp): 53.5 %   EDV(sp2-el): 36.9 ml   ESV(MOD-sp2): 17.6 ml   EF(MOD-sp2): 52.1 %                     _________________________________________________________________________________________________________________________________   RAP systole: 3.0 mmHg                                                                    RVDd (basal 4CH): 3.2 cm                     _________________________________________________________________________________________________________________________________   SV index (index-MOD-bp): 12.6     Time Measurements   MV dec time: 0.28 sec     Doppler  Measurements & Calculations   MV E max jamar: 112.0 cm/sec                                              MV V2 max: 200.9 cm/sec   MV A max jamar: 180.0 cm/sec                                              MV max P.1 mmHg   MV E/A: 0.62                                                            MV V2 mean: 114.0 cm/sec   Med Peak A' Jmaar: 7.9 cm/sec                                             MV mean P.8 mmHg                                                                           MVA(VTI): 1.4 cm2                   _________________________________________________________________________________________________________________________________   Lat A' jamar: 7.8 cm/sec                                                  MV P1/2t max jamar: 111.3 cm/sec   Lat Peak E' Jamar: 5.0 cm/sec                                             MV P1/2t: 77.2 msec   Lat E/e' ratio: 22.3                                                    MVA(P1/2t): 2.9 cm2   Med Peak E' Jamar: 3.6 cm/sec   Med E/e' ratio: 31.3                                                    MV dec slope: 422.5 cm/sec2   Average E/e': 26.8                   _________________________________________________________________________________________________________________________________   Ao V2 max: 228.1 cm/sec                                                 LV V1 max: 101.4 cm/sec   Ao max P.8 mmHg                                                    LV V1 max P.1 mmHg   Ao mean PG: 10.9 mmHg                                                   LV V1 mean P.3 mmHg   LISA(I,D): 1.2 cm2                                                       LV V1 VTI: 23.7 cm   LISA(V,D): 1.0 cm2                     _________________________________________________________________________________________________________________________________   SV(LVOT): 53.6 ml                                                       LISA (I,D) indexed (cm2/m2): 0.67                      _________________________________________________________________________________________________________________________________   Dimensionless Index: 0.52                                               SV-LVOT (indexed): 30.3             _________________________________________________________________________________________________________________________________________________   Interpreting Physician:Electronically signed by: Sherita Miles on 01/13/2022 10:22 AM   Location: [facility] Follow-up Visit:    Hospital/Nursing Home/IP Rehab Facility: [facility]  Date of Admission: 1/11/2022  Date of Discharge: 1/14/2022  Reason(s) for Admission:     NSTEMI (non-ST elevated myocardial infarction) (HCC) (Primary Dx);   CHB (complete heart block) (HCC);   Non-STEMI (non-ST elevated myocardial infarction) (HCC)  Was your hospitalization related to COVID-19? No   Problems taking medications regularly:  None  Medication changes since discharge: None  Problems adhering to non-medication therapy:  None    Summary of hospitalization:  CareEverywhere information obtained and reviewed  Diagnostic Tests/Treatments reviewed.  Follow up needed: Cardiac rehab  Other Healthcare Providers Involved in Patient s Care:         None  Update since discharge: improved.       Post Discharge Medication Reconciliation: discharge medications reconciled, continue medications without change.  Plan of care communicated with patient                  Review of Systems   Constitutional, HEENT, cardiovascular, pulmonary, gi and gu systems are negative, except as otherwise noted.      Objective    /70 (BP Location: Left arm, Patient Position: Chair, Cuff Size: Adult Regular)   Pulse 107   Temp 96.8  F (36  C) (Tympanic)   Wt 68.9 kg (152 lb)   SpO2 97%   BMI 23.11 kg/m    Body mass index is 23.11 kg/m .  Physical Exam   GENERAL: alert and no distress  RESP: lungs clear to auscultation - no rales, rhonchi or wheezes  CV: RRR  with 2/6 systolic murmur.    MS: no gross musculoskeletal defects noted, no edema  SKIN: no suspicious lesions or rashes  PSYCH: mentation appears normal, affect normal/bright    Lab on 11/01/2021   Component Date Value Ref Range Status     WBC Count 11/01/2021 12.6* 4.0 - 11.0 10e3/uL Final     RBC Count 11/01/2021 4.39* 4.40 - 5.90 10e6/uL Final     Hemoglobin 11/01/2021 13.2* 13.3 - 17.7 g/dL Final     Hematocrit 11/01/2021 40.4  40.0 - 53.0 % Final     MCV 11/01/2021 92  78 - 100 fL Final     MCH 11/01/2021 30.1  26.5 - 33.0 pg Final     MCHC 11/01/2021 32.7  31.5 - 36.5 g/dL Final     RDW 11/01/2021 20.0* 10.0 - 15.0 % Final     Platelet Count 11/01/2021 201  150 - 450 10e3/uL Final     CRP Inflammation 11/01/2021 10.5* 0.0 - 8.0 mg/L Final     Creatinine 11/01/2021 1.13  0.66 - 1.25 mg/dL Final     GFR Estimate 11/01/2021 57* >60 mL/min/1.73m2 Final    As of July 11, 2021, eGFR is calculated by the CKD-EPI creatinine equation, without race adjustment. eGFR can be influenced by muscle mass, exercise, and diet. The reported eGFR is an estimation only and is only applicable if the renal function is stable.     AST 11/01/2021 25  0 - 45 U/L Final     ALT 11/01/2021 34  0 - 70 U/L Final     No results found for any visits on 01/25/22.  No results found for this or any previous visit (from the past 24 hour(s)).

## 2022-02-04 DIAGNOSIS — Z51.81 ENCOUNTER FOR THERAPEUTIC DRUG MONITORING: ICD-10-CM

## 2022-02-04 DIAGNOSIS — M72.2 PLANTAR FASCIITIS: ICD-10-CM

## 2022-02-04 DIAGNOSIS — Z87.39 HISTORY OF CALCIUM PYROPHOSPHATE DEPOSITION DISEASE (CPPD): ICD-10-CM

## 2022-02-04 DIAGNOSIS — Z87.39 HX OF CALCIUM PYROPHOSPHATE DEPOSITION DISEASE (CPPD): ICD-10-CM

## 2022-02-04 DIAGNOSIS — D64.9 ANEMIA, UNSPECIFIED TYPE: ICD-10-CM

## 2022-02-04 DIAGNOSIS — M35.3 PMR (POLYMYALGIA RHEUMATICA) (H): ICD-10-CM

## 2022-02-07 RX ORDER — METHOTREXATE 2.5 MG/1
6 TABLET ORAL
Qty: 24 TABLET | Refills: 6 | Status: SHIPPED | OUTPATIENT
Start: 2022-02-07 | End: 2022-10-10

## 2022-02-07 NOTE — TELEPHONE ENCOUNTER
METHOTREXATE SODIUM 2.5 MG 2.5 Tablet      Last Written Prescription Date:  7-  Last Fill Quantity: 24,   # refills: 6  Last Office Visit: 7-  Future Office visit:  NONE    CBC RESULTS: Recent Labs   Lab Test 11/01/21  1035   WBC 12.6*   RBC 4.39*   HGB 13.2*   HCT 40.4   MCV 92   MCH 30.1   MCHC 32.7   RDW 20.0*          Creatinine   Date Value Ref Range Status   11/01/2021 1.13 0.66 - 1.25 mg/dL Final   05/28/2021 1.08 0.66 - 1.25 mg/dL Final   ]    Liver Function Studies -   Recent Labs   Lab Test 11/01/21  1035 08/19/21  0856 05/28/21  1025   PROTTOTAL  --   --  7.1   ALBUMIN  --   --  3.4   BILITOTAL  --   --  0.5   ALKPHOS  --   --  126   AST 25   < > 27   ALT 34   < > 29    < > = values in this interval not displayed.       Routing refill request to provider for review/approval because:  Not on protocol.        Kathleen M Doege RN

## 2022-02-17 ENCOUNTER — TRANSFERRED RECORDS (OUTPATIENT)
Dept: HEALTH INFORMATION MANAGEMENT | Facility: CLINIC | Age: 87
End: 2022-02-17
Payer: COMMERCIAL

## 2022-02-17 ENCOUNTER — NURSE TRIAGE (OUTPATIENT)
Dept: INTERNAL MEDICINE | Facility: OTHER | Age: 87
End: 2022-02-17
Payer: COMMERCIAL

## 2022-02-17 DIAGNOSIS — M35.3 PMR (POLYMYALGIA RHEUMATICA) (H): ICD-10-CM

## 2022-02-17 LAB
CREATININE (EXTERNAL): 1.14 MG/DL (ref 0.7–1.2)
GFR ESTIMATED (EXTERNAL): 60 ML/MIN/1.73M2
GLUCOSE (EXTERNAL): 105 MG/DL (ref 70–99)
POTASSIUM (EXTERNAL): 3.6 MEQ/L (ref 3.4–5.1)

## 2022-02-17 NOTE — TELEPHONE ENCOUNTER
Patient called clinic with symptoms of on episode of  lightheaded and dizziness.  Patient states episode called about 10 mins prior to calling patient states he currently was feeling much better. Patient states he stood up and feel lightheaded and dizzy. Patient denies any chest pain, shortness of breath, one sided weakness, or slurred speech. Patient's daughter came to the house while nurse was on the phone with patient. Daughter and patient decided they wanted patient seen in .     Reason for Disposition    [1] MODERATE dizziness (e.g., interferes with normal activities) AND [2] has NOT been evaluated by physician for this  (Exception: dizziness caused by heat exposure, sudden standing, or poor fluid intake)    Additional Information    Negative: Severe difficulty breathing (e.g., struggling for each breath, speaks in single words)    Negative: [1] Difficulty breathing or swallowing AND [2] started suddenly after medicine, an allergic food or bee sting    Negative: Shock suspected (e.g., cold/pale/clammy skin, too weak to stand, low BP, rapid pulse)    Negative: Difficult to awaken or acting confused (e.g., disoriented, slurred speech)    Negative: [1] Weakness (i.e., paralysis, loss of muscle strength) of the face, arm or leg on one side of the body AND [2] sudden onset AND [3] present now    Negative: [1] Numbness (i.e., loss of sensation) of the face, arm or leg on one side of the body AND [2] sudden onset AND [3] present now    Negative: [1] Loss of speech or garbled speech AND [2] sudden onset AND [3] present now    Negative: Overdose (accidental or intentional) of medications    Negative: [1] Fainted > 15 minutes ago AND [2] still feels too weak or dizzy to stand    Negative: Heart beating < 50 beats per minute OR > 140 beats per minute    Negative: Sounds like a life-threatening emergency to the triager    Negative: Chest pain    Negative: Rectal bleeding, bloody stool, or tarry-black stool    Negative:  "[1] Vomiting AND [2] contains red blood or black (\"coffee ground\") material    Negative: Vomiting is main symptom    Negative: Diarrhea is main symptom    Negative: Headache is main symptom    Negative: Patient states that he/she is having an anxiety/panic attack    Negative: Dizziness from low blood sugar (i.e., < 60 mg/dl or 3.5 mmol/l)    Negative: Dizziness is described as a spinning sensation (i.e., vertigo)    Negative: Heat exhaustion suspected (i.e., dehydration from heat exposure)    Negative: Difficulty breathing    Negative: SEVERE dizziness (e.g., unable to stand, requires support to walk, feels like passing out now)    Negative: Extra heart beats OR irregular heart beating  (i.e., \"palpitations\")    Negative: [1] Drinking very little AND [2] dehydration suspected (e.g., no urine > 12 hours, very dry mouth, very lightheaded)    Negative: Patient sounds very sick or weak to the triager    Negative: [1] Dizziness caused by heat exposure, sudden standing, or poor fluid intake AND [2] no improvement after 2 hours of rest and fluids    Negative: [1] Fever > 103 F (39.4 C) AND [2] not able to get the fever down using Fever Care Advice    Negative: [1] Fever > 101 F (38.3 C) AND [2] age > 60    Negative: [1] Fever > 100.0 F (37.8 C) AND [2] bedridden (e.g., nursing home patient, CVA, chronic illness, recovering from surgery)    Negative: [1] Fever > 100.0 F (37.8 C) AND [2] diabetes mellitus or weak immune system (e.g., HIV positive, cancer chemo, splenectomy, organ transplant, chronic steroids)    Negative: Fever present > 3 days (72 hours)    Answer Assessment - Initial Assessment Questions  1. DESCRIPTION: \"Describe your dizziness.\"      Felt lightheaded. [place] like he was going to pass out  2. LIGHTHEADED: \"Do you feel lightheaded?\" (e.g., somewhat faint, woozy, weak upon standing)      yes  3. VERTIGO: \"Do you feel like either you or the room is spinning or tilting?\" (i.e. vertigo)      no  4. SEVERITY: " "\"How bad is it?\"  \"Do you feel like you are going to faint?\" \"Can you stand and walk?\"    - MILD - walking normally    - MODERATE - interferes with normal activities (e.g., work, school)     - SEVERE - unable to stand, requires support to walk, feels like passing out now.       Patient is able to stand and walk without assistance  5. ONSET:  \"When did the dizziness begin?\"      Ten minutes ago  6. AGGRAVATING FACTORS: \"Does anything make it worse?\" (e.g., standing, change in head position)      Changing positions  7. HEART RATE: \"Can you tell me your heart rate?\" \"How many beats in 15 seconds?\"  (Note: not all patients can do this)        68  8. CAUSE: \"What do you think is causing the dizziness?\"      unsure  9. RECURRENT SYMPTOM: \"Have you had dizziness before?\" If so, ask: \"When was the last time?\" \"What happened that time?\"      no  10. OTHER SYMPTOMS: \"Do you have any other symptoms?\" (e.g., fever, chest pain, vomiting, diarrhea, bleeding)        no  11. PREGNANCY: \"Is there any chance you are pregnant?\" \"When was your last menstrual period?\"        n/a    Protocols used: DIZZINESS - [id number]      "

## 2022-02-18 ENCOUNTER — NURSE TRIAGE (OUTPATIENT)
Dept: INTERNAL MEDICINE | Facility: OTHER | Age: 87
End: 2022-02-18
Payer: COMMERCIAL

## 2022-02-18 NOTE — TELEPHONE ENCOUNTER
Emergency Department and Urgent Care Follow-up      Reason for ER/UC visit: lightheadedness  o Date seen: 2/17/22      New or Worsening symptoms:  No        Prescription Received/Picked up from Pharmacy?: NO    o Medications started? No   o Any questions or issues regarding your prescription?: no       Follow-up Results or Labs that are pending: no       Questions or concerns?: no       ER Recommends Follow-up by: 1 week with PCP       RN Recommendations:   o Appointment scheduled:   Next 5 appointments (look out 90 days)    Mar 01, 2022 10:30 AM  (Arrive by 10:15 AM)  SHORT with Christian Patel, DO  [facility] ([facility] ) [address]  [place] [address] [phone]      o   o     If you start feeling worse, or have any further questions, please feel free to contact Nurse Triage at [phone].  If needing immediate medical attention at any time please call 911/Go to the ER.

## 2022-02-21 RX ORDER — PREDNISONE 1 MG/1
2 TABLET ORAL DAILY
Qty: 60 TABLET | Refills: 3 | Status: SHIPPED | OUTPATIENT
Start: 2022-02-21

## 2022-02-21 NOTE — TELEPHONE ENCOUNTER
PREDNISONE 1 MG TABLET 1 Tablet  Last Written Prescription Date:   7/30/2021  Last Fill Quantity: 60,   # refills: 5  Last Office Visit :  7/30/2021  Future Office visit:  None  Pt asked to follow up in 6 months from July 2021 visit.  Would Provider like a visit?   Or okay to fill med for another 6 months for Pt care?  Please advise       Charla Stanton RN  Central Triage Red Flags/Med Refills    Instructions  7/30/2021       Return in about 6 months (around 1/30/2022) for using a video visit, in person.  Diagnosis:  1.  Polymyalgia rheumatica: No headaches, and no joint or muscle pain or stiffness.  I  that polymyalgia rheumatica is in remission, and recommend continuing immunosuppressive therapy.  2. Compression fracture T11     Plan:  1.  Continue methotrexate 15 mg weekly.While on methotrexate:   -- Check labs every 3 months (AST/ALT, Albumin, CBC with platelets)   -- Limit alcohol intake to 2 drinks weekly; use folate 1 mg daily.  --Tylenol 500-1000 mg can be used as needed up to three times daily for nausea/headache associated with dosing.  2.  Continue prednisone 2 mg daily  3. 10 pushups daily is acceptable; if pushups cause pain, stop immediately. Continue weight bearing exercise for optimal bony health.

## 2022-02-22 NOTE — TELEPHONE ENCOUNTER
Ok  Offer July appointment please.   Check status of prednisone Rx; please extend refills so that patient has sufficient drug to carry him at least 4 months. Thanks.

## 2022-02-23 NOTE — TELEPHONE ENCOUNTER
Pt's daughter Amarilis is returning call. Pt is able to get in to the Rheumatologist closer to him. The appointment is on June 21 st at [facility] with Adilene Staples They did not want to schedule a follow up with Dr Goyal in July as they will have already established care with the new provider. Pt's daughter just wants to make sure that the pt will have enough of his medication until the appointment in June.

## 2022-03-01 ENCOUNTER — OFFICE VISIT (OUTPATIENT)
Dept: INTERNAL MEDICINE | Facility: OTHER | Age: 87
End: 2022-03-01
Attending: INTERNAL MEDICINE
Payer: COMMERCIAL

## 2022-03-01 VITALS
WEIGHT: 151 LBS | DIASTOLIC BLOOD PRESSURE: 66 MMHG | BODY MASS INDEX: 22.96 KG/M2 | HEART RATE: 71 BPM | SYSTOLIC BLOOD PRESSURE: 114 MMHG | TEMPERATURE: 96 F | OXYGEN SATURATION: 98 %

## 2022-03-01 DIAGNOSIS — R42 DIZZINESS: Primary | ICD-10-CM

## 2022-03-01 PROCEDURE — G0463 HOSPITAL OUTPT CLINIC VISIT: HCPCS

## 2022-03-01 PROCEDURE — 99213 OFFICE O/P EST LOW 20 MIN: CPT | Performed by: INTERNAL MEDICINE

## 2022-03-01 ASSESSMENT — PAIN SCALES - GENERAL: PAINLEVEL: NO PAIN (0)

## 2022-03-01 NOTE — PROGRESS NOTES
Assessment & Plan   Problem List Items Addressed This Visit     None      Visit Diagnoses     Dizziness    -  Primary         No clear etiology identified in the ER visit and now feels fine,  No further intervention will be done.       No LOS data to display   Time spent doing chart review, history and exam, documentation and further activities per the note 25min        Kyara Dow is a 89 year old who presents for the following health issues     HPI   Patient presents for follow-up after a recent ER visit. He went to ER after having a single episode of lightheadedness when moving from sitting to standing position. Denies falling or loss of consciousness. Patient has felt well before and since the episode, normal appetite, no urinary symptoms. Denies weakness or numbness, no fevers. Recent hospitalization in January for NSTEMI, patient has cardiac pacemaker. Denies chest pain or palpitations.     ED/UC Followup:    Facility:  [facility]  Date of visit: 2/17/2022  Reason for visit: Lightheadedness  Current Status: Denies dizziness          Review of Systems   Constitutional, HEENT, cardiovascular, pulmonary, gi and gu systems are negative, except as otherwise noted.      Objective    /66 (BP Location: Left arm, Patient Position: Chair, Cuff Size: Adult Regular)   Pulse 71   Temp (!) 96  F (35.6  C) (Tympanic)   Wt 68.5 kg (151 lb)   SpO2 98%   BMI 22.96 kg/m    Body mass index is 22.96 kg/m .  Physical Exam   GENERAL: alert and no distress  RESP: lungs clear to auscultation - no rales, rhonchi or wheezes  CV: regular rate and rhythm, normal S1 S2, no S3 or S4, no murmur, click or rub, no peripheral edema and peripheral pulses strong  ABDOMEN: soft, nontender, no hepatosplenomegaly, no masses and bowel sounds normal  MS: no gross musculoskeletal defects noted, no edema    Office Visit on 01/25/2022   Component Date Value Ref Range Status     TSH 01/25/2022 5.64 (A) 0.40 - 4.00 mU/L Final      Free T4 01/25/2022 1.12  0.76 - 1.46 ng/dL Final       ----- Services Performed by a MEDICAL STUDENT in Presence of ATTENDING Physician-------   Ely NEWMAN    Case and care plan discussed with student.  I attest and agree to the documentation/evaluation as noted above.  Christian Patel, DO  [facility]

## 2022-03-01 NOTE — NURSING NOTE
"Chief Complaint   Patient presents with     Dizziness       Initial /66 (BP Location: Left arm, Patient Position: Chair, Cuff Size: Adult Regular)   Pulse 71   Temp (!) 96  F (35.6  C) (Tympanic)   Wt 68.5 kg (151 lb)   SpO2 98%   BMI 22.96 kg/m   Estimated body mass index is 22.96 kg/m  as calculated from the following:    Height as of 9/3/21: 1.727 m (5' 8\").    Weight as of this encounter: 68.5 kg (151 lb).  Medication Reconciliation: complete  GAIL GONZALES LPN  "

## 2022-03-18 ENCOUNTER — TRANSFERRED RECORDS (OUTPATIENT)
Dept: HEALTH INFORMATION MANAGEMENT | Facility: CLINIC | Age: 87
End: 2022-03-18
Payer: COMMERCIAL

## 2022-03-23 ENCOUNTER — TELEPHONE (OUTPATIENT)
Dept: INTERNAL MEDICINE | Facility: OTHER | Age: 87
End: 2022-03-23
Payer: COMMERCIAL

## 2022-03-23 NOTE — TELEPHONE ENCOUNTER
148/76 P 90  Sat for 10 minutes and then it was  134/84 P88      FYI:  Patient stopped in the [facility] today to have a BP check.  Nurse states that he wasn't symptomatic just stopped in.  She is calling to let provider know in case he want's to evaluate.

## 2022-03-24 ENCOUNTER — TELEPHONE (OUTPATIENT)
Dept: INTERNAL MEDICINE | Facility: OTHER | Age: 87
End: 2022-03-24
Payer: COMMERCIAL

## 2022-03-24 DIAGNOSIS — M35.3 PMR (POLYMYALGIA RHEUMATICA) (H): ICD-10-CM

## 2022-03-24 DIAGNOSIS — M35.3 PMR (POLYMYALGIA RHEUMATICA) (H): Primary | ICD-10-CM

## 2022-03-28 ENCOUNTER — TRANSFERRED RECORDS (OUTPATIENT)
Dept: HEALTH INFORMATION MANAGEMENT | Facility: CLINIC | Age: 87
End: 2022-03-28
Payer: COMMERCIAL

## 2022-03-28 RX ORDER — PREDNISONE 5 MG/1
5 TABLET ORAL 2 TIMES DAILY
Qty: 60 TABLET | Refills: 2 | OUTPATIENT
Start: 2022-03-28

## 2022-04-01 ENCOUNTER — OFFICE VISIT (OUTPATIENT)
Dept: FAMILY MEDICINE | Facility: OTHER | Age: 87
End: 2022-04-01
Attending: INTERNAL MEDICINE
Payer: COMMERCIAL

## 2022-04-01 ENCOUNTER — TRANSFERRED RECORDS (OUTPATIENT)
Dept: HEALTH INFORMATION MANAGEMENT | Facility: CLINIC | Age: 87
End: 2022-04-01
Payer: COMMERCIAL

## 2022-04-01 VITALS
TEMPERATURE: 96.9 F | DIASTOLIC BLOOD PRESSURE: 100 MMHG | OXYGEN SATURATION: 97 % | HEART RATE: 94 BPM | SYSTOLIC BLOOD PRESSURE: 200 MMHG

## 2022-04-01 DIAGNOSIS — Z01.30 BLOOD PRESSURE CHECK: Primary | ICD-10-CM

## 2022-04-01 LAB
CREATININE (EXTERNAL): 1.01 MG/DL (ref 0.7–1.2)
GFR ESTIMATED (EXTERNAL): >60 ML/MIN/1.73M2
GLUCOSE (EXTERNAL): 85 MG/DL (ref 70–99)
INR (EXTERNAL): 1 (ref 0.9–1.1)
POTASSIUM (EXTERNAL): 4 MEQ/L (ref 3.4–5.1)
TSH SERPL-ACNC: 4.66 UIU/ML (ref 0.4–3.99)

## 2022-04-01 PROCEDURE — 99207 PR NO CHARGE NURSE ONLY: CPT

## 2022-04-01 ASSESSMENT — PAIN SCALES - GENERAL: PAINLEVEL: NO PAIN (0)

## 2022-04-01 NOTE — PROGRESS NOTES
Patient came in for bp check, reading was high at 200/100. Patient has no complaints of chest pain, shortness of breath, dizziness or any other symptoms. Dr. Christianson was notified of blood pressure reading and advised that patient go to ER. Patient verbalized understanding and was ok with plan.

## 2022-04-08 ENCOUNTER — MYC MEDICAL ADVICE (OUTPATIENT)
Dept: INTERNAL MEDICINE | Facility: OTHER | Age: 87
End: 2022-04-08
Payer: COMMERCIAL

## 2022-04-08 DIAGNOSIS — G47.09 OTHER INSOMNIA: Primary | ICD-10-CM

## 2022-04-11 RX ORDER — MIRTAZAPINE 7.5 MG/1
7.5 TABLET, FILM COATED ORAL AT BEDTIME
Qty: 90 TABLET | Refills: 1 | Status: SHIPPED | OUTPATIENT
Start: 2022-04-11 | End: 2023-08-18

## 2022-05-04 ENCOUNTER — TRANSFERRED RECORDS (OUTPATIENT)
Dept: HEALTH INFORMATION MANAGEMENT | Facility: CLINIC | Age: 87
End: 2022-05-04
Payer: COMMERCIAL

## 2022-05-08 ENCOUNTER — TRANSFERRED RECORDS (OUTPATIENT)
Dept: HEALTH INFORMATION MANAGEMENT | Facility: CLINIC | Age: 87
End: 2022-05-08
Payer: COMMERCIAL

## 2022-05-13 ENCOUNTER — TELEPHONE (OUTPATIENT)
Dept: RHEUMATOLOGY | Facility: CLINIC | Age: 87
End: 2022-05-13
Payer: COMMERCIAL

## 2022-05-13 DIAGNOSIS — Z87.39 HISTORY OF CALCIUM PYROPHOSPHATE DEPOSITION DISEASE (CPPD): Primary | ICD-10-CM

## 2022-05-13 NOTE — TELEPHONE ENCOUNTER
"Received the following message from patient via [employer] [facility] scheduling portal:   \"I was in the ER at [facility] on Sun., May 8th for a flare up of my inflammation.  They prescribed  40mg of prednisone for 5 days.  I had a follow up Appt. yesterday with my Primary Physician. She advised me to seek your assisitance to see what amount of Prdenisone I should go to after. I do not see a new rheumatologist from my area until July. Please call my daughter, Amarilis @ [phone], as she assists me with my medications. Thank you\"    Patient is currently scheduled to see Dr. Staples in [place] on 6/21/22, was previously on 2 mg prednisone daily for PMR.   Request for advice regarding prednisone orders sent to Dr. Goyal.   Mary Urban RN  [facility]          "

## 2022-05-13 NOTE — TELEPHONE ENCOUNTER
I spoke with patient and his relative Amarilis by telephone.  History of subacute worsening of severe wrist swelling and pain for approximately 1 week.  Wrist pain was so severe the patient could not lift his arm or grasp any objects or get dressed alone.  He was seen in the emergency room on Sunday 5 days ago.  Assessment was of inflammatory arthritis with marked elevation in CRP.  Patient used 40 mg of prednisone daily for the ensuing 4 days.  Amarilis reported that within 24 hours of increasing prednisone, symptoms have nearly completely resolved.  There is no residual pain swelling or altered range of motion in the arms.  Patient had been using prednisone 4 mg daily as a basal dose for management of polymyalgia rheumatica.    I explained my impression that crystalline arthropathy, specifically gout, is the most likely cause of the patient's symptoms pseudogout is also on the differential.  I do not think that symptoms were caused by polymyalgia rheumatica.  I recommend checking uric acid level and repeating CRP to ascertain normalization of that inflammatory marker.  I recommended that patient resume prednisone 4 mg daily.  I explained that gout and pseudogout are conditions that could flare and recur, but that anti-inflammatory therapy such as prednisone may again be used safely if given intermittently and for short period.  I recommended that patient follow-up with new rheumatology provider scheduled in early July.

## 2022-05-23 ENCOUNTER — LAB (OUTPATIENT)
Dept: LAB | Facility: OTHER | Age: 87
End: 2022-05-23
Payer: MEDICARE

## 2022-05-23 DIAGNOSIS — Z87.39 HISTORY OF CALCIUM PYROPHOSPHATE DEPOSITION DISEASE (CPPD): ICD-10-CM

## 2022-05-23 LAB — URATE SERPL-MCNC: 4.5 MG/DL (ref 3.5–7.2)

## 2022-05-23 PROCEDURE — 84550 ASSAY OF BLOOD/URIC ACID: CPT | Mod: ZL

## 2022-05-23 PROCEDURE — 36415 COLL VENOUS BLD VENIPUNCTURE: CPT | Mod: ZL

## 2022-07-08 ENCOUNTER — ALLIED HEALTH/NURSE VISIT (OUTPATIENT)
Dept: FAMILY MEDICINE | Facility: OTHER | Age: 87
End: 2022-07-08
Attending: INTERNAL MEDICINE
Payer: COMMERCIAL

## 2022-07-08 VITALS — SYSTOLIC BLOOD PRESSURE: 160 MMHG | DIASTOLIC BLOOD PRESSURE: 80 MMHG | HEART RATE: 72 BPM

## 2022-07-08 DIAGNOSIS — Z01.30 BLOOD PRESSURE CHECK: Primary | ICD-10-CM

## 2022-07-08 PROCEDURE — 99207 PR NO CHARGE NURSE ONLY: CPT

## 2022-07-15 DIAGNOSIS — K21.00 GASTROESOPHAGEAL REFLUX DISEASE WITH ESOPHAGITIS, UNSPECIFIED WHETHER HEMORRHAGE: ICD-10-CM

## 2022-07-19 NOTE — TELEPHONE ENCOUNTER
Omeprazole      Last Written Prescription Date:  11/8/21  Last Fill Quantity: 90,   # refills: 3  Last Office Visit: 1/25/22  Future Office visit:

## 2022-07-25 DIAGNOSIS — I10 ESSENTIAL HYPERTENSION: ICD-10-CM

## 2022-07-26 RX ORDER — METOPROLOL SUCCINATE 25 MG/1
25 TABLET, EXTENDED RELEASE ORAL DAILY
Qty: 90 TABLET | Refills: 2 | Status: SHIPPED | OUTPATIENT
Start: 2022-07-26 | End: 2022-07-27

## 2022-07-26 NOTE — TELEPHONE ENCOUNTER
metoprolol succinate ER (TOPROL-XL) 25 MG 24 hr tablet      Last Written Prescription Date:  10/6/21  Last Fill Quantity: 90,   # refills: 2  Last Office Visit: 3/1/22  Future Office visit:       Routing refill request to provider for review/approval because:  Blood pressure out of range   Blood pressure under 140/90 in past 12 months        BP Readings from Last 3 Encounters:   07/08/22 (!) 160/80   04/01/22 (!) 200/100   03/01/22 114/66

## 2022-07-27 DIAGNOSIS — I10 ESSENTIAL HYPERTENSION: ICD-10-CM

## 2022-07-27 RX ORDER — METOPROLOL SUCCINATE 25 MG/1
50 TABLET, EXTENDED RELEASE ORAL DAILY
Qty: 180 TABLET | Refills: 2 | Status: SHIPPED | OUTPATIENT
Start: 2022-07-27 | End: 2023-01-26

## 2022-07-27 NOTE — TELEPHONE ENCOUNTER
Per pharmacy, this has increased to 50mg daily.  This was found in Care Everywhere.  Last visit 3.1.22.

## 2022-09-04 ENCOUNTER — HEALTH MAINTENANCE LETTER (OUTPATIENT)
Age: 87
End: 2022-09-04

## 2022-09-15 ENCOUNTER — TRANSFERRED RECORDS (OUTPATIENT)
Dept: HEALTH INFORMATION MANAGEMENT | Facility: CLINIC | Age: 87
End: 2022-09-15

## 2022-10-05 DIAGNOSIS — D64.9 ANEMIA, UNSPECIFIED TYPE: ICD-10-CM

## 2022-10-05 DIAGNOSIS — Z51.81 ENCOUNTER FOR THERAPEUTIC DRUG MONITORING: ICD-10-CM

## 2022-10-05 DIAGNOSIS — M72.2 PLANTAR FASCIITIS: ICD-10-CM

## 2022-10-05 DIAGNOSIS — Z87.39 HISTORY OF CALCIUM PYROPHOSPHATE DEPOSITION DISEASE (CPPD): ICD-10-CM

## 2022-10-05 DIAGNOSIS — Z87.39 HX OF CALCIUM PYROPHOSPHATE DEPOSITION DISEASE (CPPD): ICD-10-CM

## 2022-10-05 DIAGNOSIS — M35.3 PMR (POLYMYALGIA RHEUMATICA) (H): ICD-10-CM

## 2022-10-10 NOTE — TELEPHONE ENCOUNTER
Medication/Dose: methotrexate sodium 2.5 MG TABS    Last Written : 2/7/22  Last Quantity: 24, # refills: 6  Last Office Visit :  7/30/21  Pending appointment: None scheduled     Labs copied and pasted from Care Everywhere:       [facility]  Outside Information      Contains abnormal data HEMOGRAM/DIFFERENTIAL  Specimen:  Blood - Blood specimen (specimen)   Ref Range & Units 5 mo ago   WBC 3.2 - 11.0 10*9/L 8.2    RBC 4.14 - 5.76 10*12/L 4.05 Low     HGB 12.9 - 16.9 g/dL 12.1 Low     HCT 38.4 - 49.7 % 37.2 Low     MCV 81.4 - 99.0 fL 91.9    MCH 26.7 - 33.1 pg 29.9    MCHC 31.6 - 35.5 g/dL 32.5    RDW 11.3 - 14.6 % 18.0 High      - 375 10*9/L 207    Neutrophils % % 82.8    Lymphocytes % % 7.8    Monocytes % % 8.3    Eosinophils % % 0.4    Basophils % % 0.5    Immature Granulocytes % % 0.2    Neutrophils Absolute 1.5 - 7.6 10*9/L 6.8    Lymphocytes Absolute 0.8 - 3.3 10*9/L 0.6 Low     Monocytes Absolute 0.2 - 0.9 10*9/L 0.7    Eosinophils Absolute 0.0 - 0.4 10*9/L 0.0    Basophils Absolute 0.0 - 0.1 10*9/L 0.0    Immature Granulocytes Absolute 0.00 - 0.06 10*9/L 0.02    Resulting Agency  [facility] LABORATORY   Specimen Collected: 05/08/22 11:10 AM Last Resulted: 05/08/22 11:17 AM   Received From: [facility]  Result Received: 07/08/22  4:10 PM    View Encounter      [facility]  Outside Information      Contains abnormal data BASIC METABOLIC PANEL  Specimen:  Blood - Blood specimen (specimen)   Ref Range & Units 5 mo ago Comments   Sodium 134 - 143 mEq/L 136     Potassium 3.4 - 5.1 mEq/L 4.0     Chloride 99 - 110 mEq/L 101     Carbon Dioxide 19 - 29 mEq/L 24     Anion Gap 3.0 - 15.0 mEq/L 11.0     Blood Urea Nitrogen 5 - 24 mg/dL 21     Creatinine 0.70 - 1.20 mg/dL 1.13     Glomerular Filtration Rate >60 mL/min/1.73 m*2 >60  Complications of CKD and risk of cardiovascular disease increase when  GFR is below 60ml.min/1.73m2.  A persistently reduced GFR is a specific indication of Chronic Kidney Disease.  The eGFR calculation has not been validated in patients >70yrs.   Calcium 8.4 - 10.5 mg/dL 9.3     Glucose 70 - 99 mg/dL 133 High      Resulting [facility] LABORATORY    Narrative  Performed by [facility] LABORATORY  Current ADA criteria for Glucose:       Normal: 70-99 mg/dL       Impaired Fasting Glucose: 100-125 mg/dL       Diabetes Mellitus: at or above 126 mg/dL   The diagnosis of diabetes must be confirmed on a subsequent day by measuring Fasting Plasma Glucose, 2-hr PG or random plasma glucose (if symptoms are present).  Specimen Collected: 05/08/22 11:10 AM Last Resulted: 05/08/22 11:33 AM   [facility]: [facility]  Result Received: 07/08/22  4:10 PM    View Encounter      [facility]  Outside Information      Contains abnormal data C-REACTIVE PROTEIN  Specimen:  Blood - Blood specimen (specimen)   Ref Range & Units 5 mo ago   C-Reactive Protein 0.0 - 0.8 mg/dL 8.4 High     Resulting [facility] LABORATORY   Specimen Collected: 05/08/22 11:10 AM Last Resulted: 05/08/22 11:33 AM   Received From: [facility] [employer]  Result Received: 05/16/22  8:29 AM    View Encounter   Prescription did not meet protocol, and Routed to Rheumatology team because pt is overdue for labs and follow-up appointment. Message also forwarded to scheduling team.    Sunil Lester, IFTIKHARN, RN

## 2022-10-11 RX ORDER — METHOTREXATE 2.5 MG/1
6 TABLET ORAL
Qty: 24 TABLET | Refills: 6 | Status: SHIPPED | OUTPATIENT
Start: 2022-10-11

## 2023-01-15 ENCOUNTER — HEALTH MAINTENANCE LETTER (OUTPATIENT)
Age: 88
End: 2023-01-15

## 2023-01-25 DIAGNOSIS — I10 ESSENTIAL HYPERTENSION: ICD-10-CM

## 2023-01-25 NOTE — TELEPHONE ENCOUNTER
metoprolol succinate ER (TOPROL XL) 25 MG 24 hr tablet  Last Written Prescription Date:  7-27-22  Last Fill Quantity: 180,   # refills: 2  Last Office Visit: 3-1-22  Future Office visit:       Routing refill request to provider for review/approval because:  Drug not on the [facility], [facility] or [facility] refill protocol or controlled substance

## 2023-01-26 RX ORDER — METOPROLOL SUCCINATE 25 MG/1
50 TABLET, EXTENDED RELEASE ORAL DAILY
Qty: 180 TABLET | Refills: 2 | Status: SHIPPED | OUTPATIENT
Start: 2023-01-26

## 2023-03-15 DIAGNOSIS — Z51.81 ENCOUNTER FOR THERAPEUTIC DRUG MONITORING: ICD-10-CM

## 2023-03-15 DIAGNOSIS — Z87.39 HISTORY OF CALCIUM PYROPHOSPHATE DEPOSITION DISEASE (CPPD): ICD-10-CM

## 2023-03-15 DIAGNOSIS — M72.2 PLANTAR FASCIITIS: ICD-10-CM

## 2023-03-15 DIAGNOSIS — M35.3 PMR (POLYMYALGIA RHEUMATICA) (H): ICD-10-CM

## 2023-03-15 DIAGNOSIS — D64.9 ANEMIA, UNSPECIFIED TYPE: ICD-10-CM

## 2023-03-15 DIAGNOSIS — Z87.39 HX OF CALCIUM PYROPHOSPHATE DEPOSITION DISEASE (CPPD): ICD-10-CM

## 2023-03-16 ENCOUNTER — TELEPHONE (OUTPATIENT)
Dept: RHEUMATOLOGY | Facility: CLINIC | Age: 88
End: 2023-03-16
Payer: COMMERCIAL

## 2023-03-16 RX ORDER — METHOTREXATE 2.5 MG/1
6 TABLET ORAL
Qty: 24 TABLET | Refills: 6 | OUTPATIENT
Start: 2023-03-16

## 2023-03-16 NOTE — TELEPHONE ENCOUNTER
Medication/Dose: methotrexate sodium 2.5 MG TABS    Last Written : 10/11/22  Last Quantity: 24, # refills: 6  Last Office Visit :  7/30/21  Pending appointment: None on file     labs copied and pasted from Care Everywhere:    Uric acid  Order: [id number]   Collected 5/23/2022  9:05 AM      0 Result Notes     1 Patient Communication       Component Ref Range & Units 9 mo ago   Uric Acid 3.5 - 7.2 mg/dL 4.5          Contains abnormal data HEMOGRAM/DIFFERENTIAL  Order: [id number]   suggestion  Information displayed in this report may not trend or trigger automated decision support.      Ref Range & Units 3 mo ago   WBC 3.2 - 11.0 10*9/L 7.9    RBC 4.14 - 5.76 10*12/L 4.34    HGB 12.9 - 16.9 g/dL 13.2    HCT 38.4 - 49.7 % 40.6    MCV 81.4 - 99.0 fL 93.5    MCH 26.7 - 33.1 pg 30.4    MCHC 31.6 - 35.5 g/dL 32.5    RDW 11.3 - 14.6 % 16.7 High      - 375 10*9/L 225    Neutrophils % % 78.9    Lymphocytes % % 9.5    Monocytes % % 9.9    Eosinophils % % 0.9    Basophils % % 0.4    Immature Granulocytes % % 0.4    Neutrophils Absolute 1.5 - 7.6 10*9/L 6.2    Lymphocytes Absolute 0.8 - 3.3 10*9/L 0.8    Monocytes Absolute 0.2 - 0.9 10*9/L 0.8    Eosinophils Absolute 0.0 - 0.4 10*9/L 0.1    Basophils Absolute 0.0 - 0.1 10*9/L 0.0    Immature Granulocytes Absolute 0.00 - 0.06 10*9/L 0.03    Specimen Collected: 11/19/22  5:32 PM Last Resulted: 11/19/22  5:42 PM   Received From: [facility]  Result Received: 03/16/23  9:45 AM       Contains abnormal data C-REACTIVE PROTEIN  Order: [id number]   suggestion  Information displayed in this report may not trend or trigger automated decision support.      Ref Range & Units 10 mo ago   C-Reactive Protein 0.0 - 0.8 mg/dL 8.4 High     Specimen Collected: 05/08/22 11:10 AM Last Resulted: 05/08/22 11:33 AM   Received From: [facility]  Result Received: 05/08/22  8:04 PM       Contains abnormal data BASIC METABOLIC  PANEL  Order: [id number]   suggestion  Information displayed in this report may not trend or trigger automated decision support.      Ref Range & Units 10 mo ago   Sodium 134 - 143 mEq/L 136    Potassium 3.4 - 5.1 mEq/L 4.0    Chloride 99 - 110 mEq/L 101    Carbon Dioxide 19 - 29 mEq/L 24    Anion Gap 3.0 - 15.0 mEq/L 11.0    Blood Urea Nitrogen 5 - 24 mg/dL 21    Creatinine 0.70 - 1.20 mg/dL 1.13    Glomerular Filtration Rate >60 mL/min/1.73 m*2 >60    Comment: Complications of CKD and risk of cardiovascular disease increase when GFR is below 60ml.min/1.73m2.  A persistently reduced GFR is a specific indication of Chronic Kidney Disease.  The eGFR calculation has not been validated in patients >70yrs.   Calcium 8.4 - 10.5 mg/dL 9.3    Glucose 70 - 99 mg/dL 133 High     Narrative    Current ADA criteria for Glucose:       Normal: 70-99 mg/dL       Impaired Fasting Glucose: 100-125 mg/dL       Diabetes Mellitus: at or above 126 mg/dL   The diagnosis of diabetes must be confirmed on a subsequent day by measuring Fasting Plasma Glucose, 2-hr PG or random plasma glucose (if symptoms are present).  Specimen Collected: 05/08/22 11:10 AM Last Resulted: 05/08/22 11:33 AM   Received From: [facility]  Result Received: 05/08/22  8:04 PM      Creatinine (External Result)  Order: [id number]   Collected 4/1/2022  8:14 PM      0 Result Notes       Component Ref Range & Units 11 mo ago   Creatinine (External) 0.70 - 1.20 mg/dL 1.01    GFR Estimated (External) >60 mL/min/1.73m2 >60         Prescription did not meet protocol, and routed to provider. Last seen 7/30/21, overdue for med monitoring labs.    IFTIKHAR GarciaN, RN  [facility] Refill Team

## 2023-03-16 NOTE — TELEPHONE ENCOUNTER
See telephone encounter, being filled by provider in [place]. Pharmacy updated.    Mary Gonzalez, IFTIKHARN, RN  RN Care Coordinator Rheumatology

## 2023-03-16 NOTE — TELEPHONE ENCOUNTER
Message left for patient to call this RN back and confirm if he is receiving care locally. Dr. Goyal wanted to confirm his methotrexate was prescribed by local provider.    Patient called this RN back, he said yes he gets care locally, asked this RN to call his daughter.    Daughter confirmed he did just see a provider at [facility] in [place], Dr. Aida Garcia- [phone].  Daughter asked this RN to call the local pharmacy and them to send the refill request to Dr. Garcia.    Call to [facility], provided them with the physician to route the refill request to .    All questions answered.    Mary Gonzalez, IFTIKHARN, RN  RN Care Coordinator Rheumatology

## 2023-05-04 DIAGNOSIS — I25.10 CORONARY ARTERY DISEASE INVOLVING NATIVE CORONARY ARTERY OF NATIVE HEART WITHOUT ANGINA PECTORIS: ICD-10-CM

## 2023-05-04 RX ORDER — NITROGLYCERIN 0.4 MG/1
TABLET SUBLINGUAL
Qty: 25 TABLET | Refills: 1 | Status: SHIPPED | OUTPATIENT
Start: 2023-05-04

## 2023-05-05 DIAGNOSIS — K21.00 GASTROESOPHAGEAL REFLUX DISEASE WITH ESOPHAGITIS, UNSPECIFIED WHETHER HEMORRHAGE: ICD-10-CM

## 2023-05-05 NOTE — TELEPHONE ENCOUNTER
OMEPRAZOLE DR 20 MG CAPS 20 Capsule      Last Written Prescription Date:  7/19/2022  Last Fill Quantity: 90,   # refills: 3  Last Office Visit: 3/1/2022  Future Office visit:       Routing refill request to provider for review/approval because:   PPI Protocol Failed 05/05/2023 01:38 PM   Protocol Details  Recent (12 mo) or future (30 days) visit within the authorizing provider's specialty        Kimberly Boecker, RN

## 2023-08-09 DIAGNOSIS — G47.09 OTHER INSOMNIA: ICD-10-CM

## 2023-08-10 NOTE — TELEPHONE ENCOUNTER
mirtazapine (REMERON) 7.5 MG tablet 90 tablet 1 4/11/2022   Last Office Visit: 3/1/2022     Future Office visit:       Routing refill request to provider for review/approval because:

## 2023-08-11 NOTE — TELEPHONE ENCOUNTER
Please schedule patient for office visit with Dr. Patel, then please route task back to me to approve refill once appointment scheduled.

## 2023-08-17 NOTE — TELEPHONE ENCOUNTER
Called patient to schedule med review and his daughter stated he has a new PCP, Brenda Rashid and not understating why the med refills are still coming to [place]. I stated I would route the message to Dr. Patel.

## 2023-08-18 RX ORDER — MIRTAZAPINE 7.5 MG/1
7.5 TABLET, FILM COATED ORAL AT BEDTIME
Qty: 90 TABLET | Refills: 1 | Status: SHIPPED | OUTPATIENT
Start: 2023-08-18

## 2023-11-20 DIAGNOSIS — M35.3 PMR (POLYMYALGIA RHEUMATICA) (H): Primary | ICD-10-CM

## 2023-11-21 RX ORDER — PREDNISONE 5 MG/1
TABLET ORAL
Qty: 30 TABLET | Refills: 0 | Status: SHIPPED | OUTPATIENT
Start: 2023-11-21 | End: 2024-01-10

## 2023-11-21 NOTE — TELEPHONE ENCOUNTER
Prednisone (DELTASONE) 5 mg tab      Last Written Prescription Date:  2/21/22  Last Fill Quantity: 60,   # refills: 3  Last Office Visit: 4/12/22

## 2023-12-27 DIAGNOSIS — J30.89 SEASONAL ALLERGIC RHINITIS DUE TO OTHER ALLERGIC TRIGGER: ICD-10-CM

## 2023-12-27 NOTE — TELEPHONE ENCOUNTER
cetirizine (ZYRTEC) 10 MG tablet 90 tablet 1 1/18/2022     Last Office Visit: 4/12/22     Future Office visit:       Routing refill request to provider for review/approval because:

## 2023-12-29 RX ORDER — CETIRIZINE HYDROCHLORIDE 10 MG/1
10 TABLET ORAL DAILY
Qty: 90 TABLET | Refills: 0 | Status: SHIPPED | OUTPATIENT
Start: 2023-12-29 | End: 2024-04-05

## 2024-01-09 DIAGNOSIS — M35.3 PMR (POLYMYALGIA RHEUMATICA) (H): ICD-10-CM

## 2024-01-10 RX ORDER — PREDNISONE 5 MG/1
TABLET ORAL
Qty: 30 TABLET | Refills: 0 | Status: SHIPPED | OUTPATIENT
Start: 2024-01-10

## 2024-01-10 NOTE — TELEPHONE ENCOUNTER
Prednisone      Last Written Prescription Date:  11/21/23  Last Fill Quantity: 30,   # refills: 0  Last Office Visit: 1/25/22  Future Office visit:       Routing refill request to provider for review/approval because:

## 2024-02-18 ENCOUNTER — HEALTH MAINTENANCE LETTER (OUTPATIENT)
Age: 89
End: 2024-02-18

## 2024-03-04 DIAGNOSIS — I10 ESSENTIAL HYPERTENSION: ICD-10-CM

## 2024-03-04 RX ORDER — METOPROLOL SUCCINATE 25 MG/1
50 TABLET, EXTENDED RELEASE ORAL DAILY
Qty: 180 TABLET | Refills: 2 | OUTPATIENT
Start: 2024-03-04

## 2024-03-04 NOTE — TELEPHONE ENCOUNTER
metoprolol succinate ER (TOPROL XL) 25 MG 24 hr tablet      Last Written Prescription Date:  1-26-23  Last Fill Quantity: 180,   # refills: 2  Last Office Visit: 3-1-2022  Future Office visit:       Routing refill request to provider for review/approval because:  Drug not on the [facility], P or [facility] refill protocol or controlled substance

## 2024-03-04 NOTE — TELEPHONE ENCOUNTER
Beta-Blockers Protocol [id number]/04/2024 01:17 PM   Protocol Details Blood pressure under 140/90 in past 12 months    Recent (12 mo) or future (90 days) visit within the authorizing provider's specialty     BP Readings from Last 3 Encounters:   07/08/22 (!) 160/80   04/01/22 (!) 200/100   03/01/22 114/66     Patient due for follow up appointment with Dr. Patel- LOV x 2 years ago.

## 2024-03-05 ENCOUNTER — TELEPHONE (OUTPATIENT)
Dept: INTERNAL MEDICINE | Facility: OTHER | Age: 89
End: 2024-03-05

## 2024-03-05 NOTE — TELEPHONE ENCOUNTER
Patient has chosen to go to another primary care and no longer has Dr. Patel.  She is calling to update.    Thank you,   Keya Morales

## 2024-04-04 DIAGNOSIS — J30.89 SEASONAL ALLERGIC RHINITIS DUE TO OTHER ALLERGIC TRIGGER: ICD-10-CM

## 2024-04-04 NOTE — TELEPHONE ENCOUNTER
Zyrtec      Last Written Prescription Date:  12/29/23  Last Fill Quantity: 90,   # refills: 0  Last Office Visit: 3/1/22  Future Office visit:       Routing refill request to provider for review/approval because:

## 2024-04-05 RX ORDER — CETIRIZINE HYDROCHLORIDE 10 MG/1
10 TABLET ORAL DAILY
Qty: 90 TABLET | Refills: 1 | Status: SHIPPED | OUTPATIENT
Start: 2024-04-05

## 2024-04-05 NOTE — TELEPHONE ENCOUNTER
Antihistamines Protocol Failed04/04/2024 10:52 AM   Protocol Details Patient is 3-64 years of age    Recent (12 mo) or future (30 days) visit within the authorizing provider's specialty       Patient is due for a follow up

## 2024-05-28 DIAGNOSIS — K21.00 GASTROESOPHAGEAL REFLUX DISEASE WITH ESOPHAGITIS, UNSPECIFIED WHETHER HEMORRHAGE: ICD-10-CM

## 2024-05-28 NOTE — TELEPHONE ENCOUNTER
OMEPRAZOLE DR 20 MG CAPS 20 Capsule       Last Written Prescription Date:  5/5/23  Last Fill Quantity: 90,   # refills: 3  Last Office Visit: 3/1/22  Future Office visit:       Routing refill request to provider for review/approval because:      PPI Protocol Gkfftt0005/28/2024 10:32 AM   Protocol Details Recent (12 mo) or future (90 days) visit within the authorizing provider's specialty

## 2024-08-21 NOTE — TELEPHONE ENCOUNTER
Patient's daughter called stating she received a phone call from patient in regards to need a tooth extracted. Daughter states patient is on vacation in [facility]. Nurse informed daughter due to no written consent no information could be shared with daughter but message can be taken.  Daughter states father called her stating dentist is wanting approval from PCP and Cardiologist to extract tooth.  Daughter states patient is on Plavix and aspirin and PCP and Cardiologist would not give approval for extraction due to recent heart surgery and medication patient is on.  PCP to advise.   Refill passed per [facility] protocol.  Requested Prescriptions   Pending Prescriptions Disp Refills    PROPRANOLOL 10 MG Oral Tab [Pharmacy Med Name: PROPRANOLOL 10 MG TABLET] 90 tablet 0     Sig: TAKE 1 TABLET BY MOUTH ONCE A DAY       Hypertension Medications Protocol Passed - 8/19/2024  2:16 PM        Passed - CMP or BMP in past 12 months        Passed - Last BP reading less than 140/90     BP Readings from Last 1 Encounters:   07/24/24 123/83               Passed - In person appointment or virtual visit in the past 12 mos or appointment in next 3 mos     Recent Outpatient Visits              4 weeks ago Reactive depression    [facility] Aidee Brantley MD    Office Visit    1 month ago Reactive depression    [facility] Aidee Brantley MD    Office Visit    2 months ago Type 2 diabetes mellitus without complication, without long-term current use of insulin ([facility])    [facility] Mela Jesus APRN    Office Visit    3 months ago Type 2 diabetes mellitus without complication, without long-term current use of insulin ([facility])    [facility] Aidee Brantley MD    Office Visit    5 months ago Type 2 diabetes mellitus without complication, without long-term current use of insulin ([facility])    [facility] Aidee Brantley MD    Office Visit          Future Appointments         Provider Department Appt Notes    Today Aidee Brantley MD [facility] FMLA update    In 1 week Mela Jesus APRN [facility] Diabetes and hyperthyroidism                    Passed - EGFRCR or GFRNAA > 50     GFR Evaluation  EGFRCR: 104 , resulted on 3/21/2024

## 2025-01-14 DIAGNOSIS — J30.89 SEASONAL ALLERGIC RHINITIS DUE TO OTHER ALLERGIC TRIGGER: ICD-10-CM

## 2025-01-14 RX ORDER — CETIRIZINE HYDROCHLORIDE 10 MG/1
10 TABLET ORAL DAILY
Qty: 90 TABLET | Refills: 1 | OUTPATIENT
Start: 2025-01-14

## 2025-01-14 NOTE — TELEPHONE ENCOUNTER
CETIRIZINE HCL 10 MG TABS 10 Tablet         Last Written Prescription Date:  4/5/24  Last Fill Quantity: 90,   # refills: 1  Last Office Visit: 3/10/22  Future Office visit:       Routing refill request to provider for review/approval because:    Antihistamines Protocol [id number]/14/2025 02:06 PM   Protocol Details Patient is 3-64 years of age    Recent (12 mo) or future (30 days) visit within the authorizing provider's specialty

## 2025-03-09 ENCOUNTER — HEALTH MAINTENANCE LETTER (OUTPATIENT)
Age: OVER 89
End: 2025-03-09

## 2025-04-18 DIAGNOSIS — J30.89 SEASONAL ALLERGIC RHINITIS DUE TO OTHER ALLERGIC TRIGGER: ICD-10-CM

## 2025-04-21 RX ORDER — CETIRIZINE HYDROCHLORIDE 10 MG/1
10 TABLET ORAL DAILY
Qty: 90 TABLET | Refills: 2 | OUTPATIENT
Start: 2025-04-21

## 2025-04-21 NOTE — TELEPHONE ENCOUNTER
CETIRIZINE HCL 10 MG TABS 10 Tablet         Last Written Prescription Date:  4/5/24  Last Fill Quantity: 90,   # refills: 1  Last Office Visit: 3/1/22  Future Office visit:       Routing refill request to provider for review/approval because:  Antihistamines Protocol Xksjht1104/18/2025 04:50 PM   Protocol Details Patient is 3-64 years of age    Recent (12 month) or future (90 days) visit with authorizing provider s specialty

## 2025-05-19 DIAGNOSIS — S22.082D: Primary | ICD-10-CM

## 2025-05-21 NOTE — TELEPHONE ENCOUNTER
oxycodone 5 mg tablet     Medication is not on Medication List   New NH Admit - routing to Dr. Macdonald to advise.     Rx Protocol Controlled Substance Oqhhpy8405/19/2025 02:38 PM   Protocol Details Visit with relevant provider in past 3 months or upcoming 3 months (provided they have been seen in the last 6 months)    Medication is active on med list and the sig matches    Urine drug screeen results on file in past 12 months    Controlled Substance Agreement on file in last 12 months    Auto Fail - Please forward to Provider    PHQ9 done in past year    Naloxone on active med list    CHELO-7 done in past year         3/10/2020    10:00 AM   PHQ   PHQ-9 Total Score 0   Q9: Thoughts of better off dead/self-harm past 2 weeks Not at all            No data to display

## 2025-05-22 RX ORDER — OXYCODONE HYDROCHLORIDE 5 MG/1
5 TABLET ORAL EVERY 6 HOURS PRN
Qty: 90 TABLET | Refills: 0 | Status: SHIPPED | OUTPATIENT
Start: 2025-05-22

## 2025-05-23 PROBLEM — Z78.9 NURSING HOME RESIDENT: Status: ACTIVE | Noted: 2025-05-23

## 2025-06-02 ENCOUNTER — NURSING HOME VISIT (OUTPATIENT)
Dept: GERIATRICS | Facility: OTHER | Age: OVER 89
End: 2025-06-02
Attending: FAMILY MEDICINE
Payer: COMMERCIAL

## 2025-06-02 DIAGNOSIS — Z78.9 NURSING HOME RESIDENT: Primary | ICD-10-CM

## 2025-06-02 PROBLEM — R94.31 PROLONGED Q-T INTERVAL ON ECG: Status: ACTIVE | Noted: 2025-05-13

## 2025-06-02 PROBLEM — Z95.0 PRESENCE OF CARDIAC PACEMAKER: Chronic | Status: ACTIVE | Noted: 2019-11-14

## 2025-06-02 PROBLEM — N40.0 PROSTATE ENLARGEMENT: Status: ACTIVE | Noted: 2025-06-02

## 2025-06-02 PROBLEM — M79.89 SWELLING OF RIGHT HAND: Status: ACTIVE | Noted: 2023-08-10

## 2025-06-02 PROBLEM — H52.4 PRESBYOPIA: Status: ACTIVE | Noted: 2021-05-27

## 2025-06-02 PROBLEM — M25.431 PAIN AND SWELLING OF RIGHT WRIST: Status: ACTIVE | Noted: 2023-08-10

## 2025-06-02 PROBLEM — A41.9 SEPTIC SHOCK (H): Status: ACTIVE | Noted: 2023-09-17

## 2025-06-02 PROBLEM — Z95.5 PRESENCE OF STENT IN LAD CORONARY ARTERY: Status: ACTIVE | Noted: 2025-05-05

## 2025-06-02 PROBLEM — R65.21 SEPTIC SHOCK (H): Status: ACTIVE | Noted: 2023-09-17

## 2025-06-02 PROBLEM — A41.9 SEPSIS, DUE TO UNSPECIFIED ORGANISM, UNSPECIFIED WHETHER ACUTE ORGAN DYSFUNCTION PRESENT (H): Status: ACTIVE | Noted: 2023-11-23

## 2025-06-02 PROBLEM — M25.531 PAIN AND SWELLING OF RIGHT WRIST: Status: ACTIVE | Noted: 2023-08-10

## 2025-06-02 PROBLEM — R53.1 WEAKNESS GENERALIZED: Status: ACTIVE | Noted: 2022-03-18

## 2025-06-02 PROBLEM — I45.10 RIGHT BUNDLE BRANCH BLOCK: Status: ACTIVE | Noted: 2025-06-02

## 2025-06-02 PROBLEM — H11.31 SUBCONJUNCTIVAL HEMORRHAGE, NON-TRAUMATIC, RIGHT: Status: ACTIVE | Noted: 2021-10-20

## 2025-06-02 PROBLEM — G47.09 OTHER INSOMNIA: Status: ACTIVE | Noted: 2025-05-12

## 2025-06-02 PROBLEM — M15.9 OSTEOARTHRITIS, GENERALIZED: Status: ACTIVE | Noted: 2024-01-14

## 2025-06-02 PROBLEM — N39.0 URINARY TRACT INFECTION WITHOUT HEMATURIA: Status: ACTIVE | Noted: 2023-09-17

## 2025-06-02 PROBLEM — R42 POSTURAL LIGHTHEADEDNESS: Status: ACTIVE | Noted: 2022-05-04

## 2025-06-02 PROBLEM — H26.491 POSTERIOR CAPSULAR OPACIFICATION NON VISUALLY SIGNIFICANT, RIGHT: Status: ACTIVE | Noted: 2018-04-12

## 2025-06-02 PROBLEM — M81.0 OSTEOPOROSIS WITHOUT CURRENT PATHOLOGICAL FRACTURE: Status: ACTIVE | Noted: 2025-05-05

## 2025-06-02 PROBLEM — M06.9 RHEUMATOID ARTHRITIS FLARE (H): Chronic | Status: ACTIVE | Noted: 2023-11-29

## 2025-06-02 PROCEDURE — 99316 NF DSCHRG MGMT 30 MIN+: CPT | Performed by: FAMILY MEDICINE

## 2025-06-02 NOTE — PROGRESS NOTES
Nursing Home Discharge Visit    HISTORY OF PRESENT ILLNESS:  Christian is a 89 year old male (2/16/1930)  is a resident of *** who is being seen today for Nursing Home discharge visit.    Advanced Directives:  POLST ***    Date of admission:  ***    Date of discharge:  ***    Consultations:  Physical Therapy, Occupational Therapy, ***.    *** has a past medical history significant for but not limited to ***.    *** was admitted from *** after being hospitalized with ***.      The patient notes ***.    Discussed with nursing staff who note ***.    The patient is seen in *** room accompanied by facility nurse.  Family is *** present.     Current medications, allergies, and interdisciplinary care plan are reviewed.      Nursing Home Course:  ***      Patient Active Problem List    Diagnosis Date Noted    Heart block AV third degree (H) 01/08/2020     Priority: High    Coronary artery disease involving native coronary artery of native heart without angina pectoris 01/08/2020     Priority: High    Prostate enlargement 06/02/2025     Priority: Medium    Right bundle branch block 06/02/2025     Priority: Medium    Prolonged Q-T interval on ECG 05/13/2025     Priority: Medium    Other insomnia 05/12/2025     Priority: Medium    Osteoporosis without current pathological fracture 05/05/2025     Priority: Medium    Presence of stent in LAD coronary artery 05/05/2025     Priority: Medium    Osteoarthritis, generalized 01/14/2024     Priority: Medium    Rheumatoid arthritis flare (H) 11/29/2023     Priority: Medium    Sepsis, due to unspecified organism, unspecified whether acute organ dysfunction present (H) 11/23/2023     Priority: Medium    Septic shock (H) 09/17/2023     Priority: Medium    Urinary tract infection without hematuria 09/17/2023     Priority: Medium    Pain and swelling of right wrist 08/10/2023     Priority: Medium    Swelling of right hand 08/10/2023     Priority: Medium    Postural lightheadedness 05/04/2022      Priority: Medium    Weakness generalized 03/18/2022     Priority: Medium    Subconjunctival hemorrhage, non-traumatic, right 10/20/2021     Priority: Medium    Chronic right-sided low back pain without sciatica 05/28/2021     Priority: Medium    Closed unstable burst fracture of eleventh thoracic vertebra with routine healing 05/28/2021     Priority: Medium    Presbyopia 05/27/2021     Priority: Medium    Presence of cardiac pacemaker 11/14/2019     Priority: Medium     Dr. Hanks  High grade AV block  11/14/2019 Sequence PPM, model L311, serial# [id number]  11/14/2019 Sequence RA lead, model 7740, serial# [id number]  11/14/2019 Sequence RV lead, model 7741, serial# [id number]      Posterior capsular opacification non visually significant, right 04/12/2018     Priority: Medium    Anemia 11/17/2017     Priority: Medium    Encounter for therapeutic drug monitoring 06/11/2017     Priority: Medium    History of calcium pyrophosphate deposition disease (CPPD) 05/12/2017     Priority: Medium    Status post total left knee replacement 04/26/2016     Priority: Medium    S/P lens implant 03/03/2016     Priority: Medium    Presence of intraocular lens 06/04/2015     Priority: Medium    Osteoarthritis of right hip 03/06/2014     Priority: Medium    Pain in thoracic spine 01/06/2014     Priority: Medium    Osteoarthritis of left wrist 10/17/2013     Priority: Medium    Plantar fasciitis of left foot 06/17/2013     Priority: Medium    Pes planus 05/24/2012     Priority: Medium     Updated per 10/1/17 IMO import      Osteoarthritis of pelvis 12/15/2011     Priority: Medium     IMO Update 10/11      Pain in joint, pelvic region and thigh 12/15/2011     Priority: Medium     IMO Update 10/11      Osteoarthritis of foot joint 08/03/2010     Priority: Medium     IMO Update 10/11      Rotator cuff (capsule) sprain 02/05/2010     Priority: Medium    Shoulder bursitis 02/05/2010     Priority: Medium     IMO Update  10/11      Osteoarthrosis, localized, primary, involving lower leg 07/06/2009     Priority: Medium     IMO Update  IMO Update 10 2016      Trigger finger 06/02/2009     Priority: Medium     Left middle finger  IMO Update 10/11      Plantar fascial fibromatosis 03/19/2008     Priority: Medium     Right foot      Primary hypertension 01/10/2008     Priority: Medium    Nursing Home Visit 05/23/2025     Priority: Low          Social History     Socioeconomic History    Marital status:      Spouse name: Not on file    Number of children: Not on file    Years of education: Not on file    Highest education level: Not on file   Occupational History    Not on file   Tobacco Use    Smoking status: Former    Smokeless tobacco: Never   Substance and Sexual Activity    Alcohol use: Not on file    Drug use: Not on file    Sexual activity: Not Currently   Other Topics Concern    Not on file   Social History Narrative    Not on file     Social Drivers of Health     Financial Resource Strain: Low Risk  (1/14/2024)    Received from [facility]    Overall Financial Resource Strain (CARDIA)     Difficulty of Paying Living Expenses: Not hard at all   Food Insecurity: No Food Insecurity (5/15/2025)    Received from [facility]    Hunger Vital Sign     Worried About Running Out of Food in the Last Year: Never true     Ran Out of Food in the Last Year: Never true   Transportation Needs: No Transportation Needs (5/15/2025)    Received from [facility]    PRAPARE - Transportation     Lack of Transportation (Medical): No     Lack of Transportation (Non-Medical): No   Physical Activity: Not on file   Stress: Not on file   Social Connections: Not on file   Interpersonal Safety: Not At Risk (5/12/2025)    Received from [facility] IP Custom IPV     Do you feel UNSAFE in any of your personal  relationships with your family members or any other acquaintances?: No   Housing Stability: Low Risk  (5/15/2025)    Received from [facility]    Housing Stability Vital Sign     Unable to Pay for Housing in the Last Year: No     Number of Times Moved in the Last Year: 0     Homeless in the Last Year: No        Current Outpatient Medications   Medication Sig Dispense Refill    acetaminophen (TYLENOL) 500 MG tablet Take 1,000 mg by mouth 3 times daily as needed for mild pain.      Ascorbic Acid (VITAMIN C) 500 MG CAPS Take 1 capsule by mouth daily      ASPIRIN PO Take 81 mg by mouth daily      brimonidine (ALPHAGAN-P) 0.15 % ophthalmic solution Place 1 drop into both eyes 2 times daily       buprenorphine (BUTRANS) 5 MCG/HR WK patch Place 1 patch onto the skin every 7 days.      Calcium Carbonate (CALCIUM 600 PO) Take 1 tablet by mouth daily      cholecalciferol (D3) 50 MCG (2000 UT) tablet Take 1 tablet by mouth daily      Ferrous Gluconate 240 (27 Fe) MG TABS Take by mouth daily.      FOLIC ACID PO Take 800 mcg by mouth daily      furosemide (LASIX) 40 MG tablet Take 1.5 tablets by mouth daily.      latanoprost (XALATAN) 0.005 % ophthalmic solution PLACE 1 DROP INTO BOTH EYES AT BEDTIME.      levothyroxine (SYNTHROID/LEVOTHROID) 25 MCG tablet TAKE 1 TABLET DAILY FOR THYROID (Patient taking differently: Take 50 mcg by mouth daily.) 90 tablet 0    melatonin 3 MG tablet Take 6 mg by mouth at bedtime.      metoprolol succinate ER (TOPROL XL) 25 MG 24 hr tablet TAKE 2 TABLETS (50 MG) BY MOUTH DAILY 180 tablet 2    nitroGLYcerin (NITROSTAT) 0.4 MG sublingual tablet FOR CHEST PAIN PLACE 1 TABLET UNDER THE TONGUE EVERY 5 MINUTES FOR 3 DOSES. IF SYMPTOMS PERSIST 5 MINUTES AFTER 1ST DOSE CALL 911. 25 tablet 1    nitroGLYcerin (NITROSTAT) 0.4 MG sublingual tablet Place 0.4 mg under the tongue      omeprazole (PRILOSEC) 20 MG DR capsule TAKE 1 CAPSULE (20 MG) BY MOUTH DAILY 90 capsule 3     oxyCODONE (OXYCONTIN) 10 MG 12 hr tablet Take 10 mg by mouth every 4 hours as needed for severe pain.      oxyCODONE (ROXICODONE) 5 MG tablet Take 1 tablet (5 mg) by mouth every 6 hours as needed for severe pain. 90 tablet 0    polyethylene glycol (MIRALAX) 17 g packet Take 1 packet by mouth daily.      potassium chloride neelima ER (KLOR-CON M10) 10 MEQ CR tablet Take 10 mEq by mouth daily.      predniSONE (DELTASONE) 5 MG tablet TAKE 1 TABLET BY MOUTH ONCE A DAY BEGIN AFTER COMPLETION OF THE PREDNISONE TAPER. 30 tablet 0    sennosides (SENOKOT) 8.6 MG tablet Take 1 tablet by mouth 2 times daily.      tamsulosin (FLOMAX) 0.4 MG capsule Take 0.8 mg by mouth daily      traZODone (DESYREL) 100 MG tablet Take 100 mg by mouth at bedtime.       No current facility-administered medications for this visit.       Allergies   Allergen Reactions    Tramadol GI Disturbance    Cialis  [Tadalafil] Other (See Comments)    Hydrocodone     Morphine Other (See Comments)    Hydrocodone-Acetaminophen Other (See Comments)     Acts goofy and delirious       {CASE MANAGEMENT NURSING HOME:280022}      OBJECTIVE:  There were no vitals taken for this visit.    Physical Exam          Lab/Diagnostic data:    Reviewed    Discharge status:  ***    Follow up ***    [place]ic[place]    [facility]      Total time spent on the day of service was {1/2/3/4/5:705991} including patient visit, review of pertinent clinical information, treatment plan, and documentation.      Brooks Macdonald MD FAAFP [facility] CMD  Geriatrics  Hospice and Palliative Care    Morphine Other (See Comments)    Hydrocodone-Acetaminophen Other (See Comments)     Acts goofy and delirious       I have reviewed the care plan and do agree with the plan.      OBJECTIVE:  There were no vitals taken for this visit.    Physical Exam          Lab/Diagnostic data:    Reviewed    Discharge status:  Stable    Follow up NH physician    Medicare Face to Face:    DME       None      Home Care       None      Total time spent on the day of service was 35 min including patient visit, review of pertinent clinical information, treatment plan, and documentation.      Brooks Macdonald MD FAAFP [facility] CMD  Geriatrics  Hospice and Palliative Care

## 2025-06-04 PROBLEM — J96.11 CHRONIC RESPIRATORY FAILURE WITH HYPOXIA, ON HOME O2 THERAPY (H): Chronic | Status: ACTIVE | Noted: 2023-09-25

## 2025-06-04 PROBLEM — M86.9 OSTEOMYELITIS OF RIGHT FOOT, UNSPECIFIED TYPE (H): Status: ACTIVE | Noted: 2025-05-12

## 2025-06-04 PROBLEM — I21.4 NSTEMI (NON-ST ELEVATION MYOCARDIAL INFARCTION) (H): Status: ACTIVE | Noted: 2019-11-09

## 2025-06-04 PROBLEM — I35.0 NONRHEUMATIC AORTIC VALVE STENOSIS: Status: ACTIVE | Noted: 2020-02-18

## 2025-06-04 PROBLEM — Z86.711 HISTORY OF PULMONARY EMBOLISM: Status: ACTIVE | Noted: 2019-11-09

## 2025-06-04 PROBLEM — Z99.81 CHRONIC RESPIRATORY FAILURE WITH HYPOXIA, ON HOME O2 THERAPY (H): Chronic | Status: ACTIVE | Noted: 2023-09-25

## 2025-06-04 PROBLEM — E78.5 DYSLIPIDEMIA: Status: ACTIVE | Noted: 2025-06-04

## 2025-06-04 PROBLEM — J69.0 ASPIRATION PNEUMONIA OF BOTH LOWER LOBES (H): Status: ACTIVE | Noted: 2023-09-21

## 2025-06-04 PROBLEM — K00.2 HYPERTAURODONTISM: Status: ACTIVE | Noted: 2022-07-08

## 2025-06-04 PROBLEM — I27.20 PULMONARY HYPERTENSION (H): Status: ACTIVE | Noted: 2025-06-04

## 2025-06-04 PROBLEM — Z86.16 HISTORY OF COVID-19: Status: ACTIVE | Noted: 2023-11-23

## 2025-06-04 PROBLEM — M05.79 RHEUMATOID ARTHRITIS INVOLVING MULTIPLE SITES WITH POSITIVE RHEUMATOID FACTOR (H): Status: ACTIVE | Noted: 2025-06-04

## 2025-06-04 PROBLEM — D50.9 IRON DEFICIENCY ANEMIA: Status: ACTIVE | Noted: 2025-05-20

## 2025-06-04 PROBLEM — I50.32 CHRONIC DIASTOLIC CONGESTIVE HEART FAILURE (H): Chronic | Status: ACTIVE | Noted: 2023-09-17

## 2025-06-04 PROBLEM — I70.235: Chronic | Status: ACTIVE | Noted: 2025-05-05

## 2025-06-04 PROBLEM — J18.9 COMMUNITY ACQUIRED PNEUMONIA OF LEFT LOWER LOBE OF LUNG: Status: ACTIVE | Noted: 2024-03-18

## 2025-06-04 PROBLEM — U07.1 COVID-19: Status: RESOLVED | Noted: 2023-09-04 | Resolved: 2025-06-04

## 2025-06-04 PROBLEM — L03.114 CELLULITIS OF LEFT UPPER EXTREMITY: Status: ACTIVE | Noted: 2023-11-27

## 2025-06-04 PROBLEM — D84.9 IMMUNOSUPPRESSED STATUS: Status: ACTIVE | Noted: 2023-09-28

## 2025-06-04 PROBLEM — U07.1 COVID-19: Status: ACTIVE | Noted: 2023-09-04

## 2025-06-04 PROBLEM — H81.10 BPPV (BENIGN PAROXYSMAL POSITIONAL VERTIGO): Chronic | Status: ACTIVE | Noted: 2025-05-01

## 2025-06-04 PROBLEM — I05.0 MITRAL STENOSIS: Status: ACTIVE | Noted: 2020-02-18

## 2025-06-04 PROBLEM — I44.39 HIGH-GRADE ATRIOVENTRICULAR BLOCK: Status: ACTIVE | Noted: 2019-11-13

## 2025-06-04 PROBLEM — H90.3 BILATERAL SENSORINEURAL HEARING LOSS: Status: ACTIVE | Noted: 2019-04-17
